# Patient Record
Sex: FEMALE | Race: WHITE | NOT HISPANIC OR LATINO | Employment: UNEMPLOYED | URBAN - METROPOLITAN AREA
[De-identification: names, ages, dates, MRNs, and addresses within clinical notes are randomized per-mention and may not be internally consistent; named-entity substitution may affect disease eponyms.]

---

## 2023-06-25 ENCOUNTER — NURSING HOME VISIT (OUTPATIENT)
Dept: POST ACUTE CARE | Facility: EXTERNAL LOCATION | Age: 83
End: 2023-06-25
Payer: MEDICARE

## 2023-06-25 DIAGNOSIS — J96.01 ACUTE RESPIRATORY FAILURE WITH HYPOXIA (MULTI): Primary | ICD-10-CM

## 2023-06-25 DIAGNOSIS — J18.9 MULTIFOCAL PNEUMONIA: ICD-10-CM

## 2023-06-25 DIAGNOSIS — F01.A0 MILD VASCULAR DEMENTIA WITHOUT BEHAVIORAL DISTURBANCE, PSYCHOTIC DISTURBANCE, MOOD DISTURBANCE, OR ANXIETY (MULTI): ICD-10-CM

## 2023-06-25 DIAGNOSIS — U07.1 COVID-19: ICD-10-CM

## 2023-06-25 DIAGNOSIS — E87.6 HYPOKALEMIA: ICD-10-CM

## 2023-06-25 DIAGNOSIS — I10 BENIGN ESSENTIAL HYPERTENSION: ICD-10-CM

## 2023-06-25 DIAGNOSIS — R53.81 PHYSICAL DECONDITIONING: ICD-10-CM

## 2023-06-25 PROCEDURE — 99306 1ST NF CARE HIGH MDM 50: CPT | Performed by: FAMILY MEDICINE

## 2023-06-25 NOTE — PROGRESS NOTES
Admission H and P    CC Covid 19 Ac resp failure    HPI  Ms. Ochoa is an 82 year old female with a PMH of HTN, HLD, and vascular  dementia who was presented to the ED with c/o SOB. On arrival to the ED, she  was mildly tachycardic, with SPO2 low normal, otherwise her vital signs were  within normal limits. Blood examination obtained was remarkable for  hyperglycemia, hypokalemia, and elevated troponin (40->37). An EKG was without  obvious signs of ischemia. She was given 500 mg of Zithromax, 6mg IV  dexamethasone, 20 meq IV K+, 40 meq PO K+, 2gm Rocephin, and 1L LR bolus. A CTA  PE was negative for PE, however did demonstrated bilateral scattered  ground-glass, intersitial, and consolidative opacities consistent with  multifocal pneumonia due to covid. Her home covid test was positive. She was  admitted to the medicine service as inpatient with telemetry for further  treatment and monitoring.  Today she is sitting in bed Uneventful night    ROS 14 systems reviewed negative except above    Active Problems  Problems   · Abnormal EEG (794.02) (R94.01)   · Abnormal EKG (794.31) (R94.31)   · Abnormal glucose (790.29) (R73.09)   · Acquired claw toe of right foot (735.5) (M20.5X1)   · Acute lower UTI (599.0) (N39.0)   · Arthralgia of pelvis or thigh (719.45) (M25.559)   · Astigmatism (367.20) (H52.209)   · Astigmatism of both eyes with presbyopia (367.20,367.4) (H52.203,H52.4)   · Ataxic gait (781.2) (R26.0)   · Ataxic gait (781.2) (R26.0)   · Benign essential hypertension (401.1) (I10)   · Cataract, nuclear sclerotic, left eye (366.16) (H25.12)   · Cataract, nuclear sclerotic, right eye (366.16) (H25.11)   · Cerebrovascular disease (437.9) (I67.9)   · Cognitive disorder (294.9) (F09)   · Colon cancer screening (V76.51) (Z12.11)   · Combined form of age-related cataract, both eyes (366.19) (H25.813)   · Dementia (294.20) (F03.90)   · Depression (311) (F32.A)   · in remission with ongoing treatment   · Elevated low density  lipoprotein (LDL) cholesterol level (272.0) (E78.00)   · Encounter for screening for malignant neoplasm of colon (V76.51) (Z12.11)   · Encounter for screening mammogram for breast cancer (V76.12) (Z12.31)   · Essential hypertension (401.9) (I10)   · Excess ear wax (380.4) (H61.20)   · Excessive cerumen in both ear canals (380.4) (H61.23)   · Executive function deficit (799.55) (R41.844)   · Falls (E888.9) (W19.XXXA)   · Fatigue (780.79) (R53.83)   · 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)   · Hallux rigidus of right foot (735.2) (M20.21)   · Hallux valgus of right foot (735.0) (M20.11)   · Hammer toe (735.4) (M20.40)   · Hip pain (719.45) (M25.559)   · Hypercholesterolemia (272.0) (E78.00)   · Hyperopia (367.0) (H52.00)   · Impacted cerumen of left ear (380.4) (H61.22)   · LBBB (left bundle branch block) (426.3) (I44.7)   · Low vitamin D level (790.6) (R79.89)   · Mild cognitive impairment (331.83) (G31.84)   · non amnestic   · Multiple acquired skin tags (701.9) (L91.8)   · Myalgia (729.1) (M79.10)   · OA (osteoarthritis) of knee (715.36) (M17.9)   · OA (osteoarthritis) of knee (715.36) (M17.9)   · Osteoporosis (733.00) (M81.0)   · Overactive bladder (596.51) (N32.81)   · Pelvic fracture (808.8) (S32.9XXA)   · Pelvic fracture (808.8) (S32.9XXA)   · Poor balance (781.99) (R26.89)   · Primary osteoarthritis of left knee (715.16) (M17.12)   · Recurrent falls (V15.88) (R29.6)   · Refraction error (367.9) (H52.7)   · Screening for colorectal cancer (V76.51,V76.41) (Z12.11,Z12.12)   · Screening for osteoporosis (V82.81) (Z13.820)   · Screening mammogram, encounter for (V76.12) (Z12.31)   · Skin lesion, superficial (709.9) (L98.9)   · Traumatic brain injury (854.00) (S06.9XAA)   · Urinary incontinence (788.30) (R32)   · UTI symptoms (788.99) (R39.9)   · Vascular dementia (290.40) (F01.50)   · Visit for screening mammogram (V76.12) (Z12.31)   · Vitamin D insufficiency (268.9) (E55.9)   · Weakness of both legs (729.89)  (R29.898)   · Weight gain (783.1) (R63.5)    Past Medical History  Problems   · History of Combined form of age-related cataract, left eye (366.19) (H25.812)   · History of Combined form of age-related cataract, right eye (366.19) (H25.811)   · Depression (311) (F32.A)   · in remission with ongoing treatment    Surgical History  Problems   · History of Cholecystectomy   · History of Hysterectomy   · History of Tonsillectomy    Family History  Mother   · Family history of Colon Cancer (V16.0)   · Family history of Diabetes Mellitus (V18.0)   · Family history of Hypertension (V17.49)  Father   · Family history of Pick's disease   · confirmed via autopsy  Brother   · Family history of Acute Myocardial Infarction (V17.3)   · Family history of Diabetes Mellitus (V18.0)   · Family history of Stroke Syndrome (V17.1)    Social History  Problems   · Education Level: Graduate school   · Marital History - Single   · Never a smoker   · Never Drank Alcohol   · Never smoker   · Non-smoker (V49.89) (Z78.9)   · Occupation: Retired   · retired teacher    Allergies  Medication   · Aspirin TABS  Recorded By: Digna Osman; 10/11/2013 12:46:29 PM   · david  Recorded By: Katty Azevedo; 10/19/2016 9:24:13 AM   · Hops Flower POWD  Recorded By: Essence Villanueva; 5/8/2019 11:04:34 AM   · Onion Extract POWD  Recorded By: Katty Azevedo; 10/19/2016 9:24:13 AM  NonMedication   · Latex  Recorded By: Agnieszka Snyder; 2/7/2014 11:53:52 AM    Physical Exam by System:    Constitutional: Well developed, awake/alert/oriented x3, no distress on RA,  alert and cooperative  Eyes: PERRL, EOMI, clear sclera  ENMT: mucous membranes moist, no apparent injury, no lesions seen  Head/Neck: Neck supple, no apparent injury, thyroid without mass or tenderness,  No JVD, trachea midline, no bruits  Respiratory/Thorax: Patent airways, CTAB, normal breath sounds, no wheezes,  Cardiovascular: Regular, rate and rhythm, no murmurs, normal S 1and S  2  Gastrointestinal: Nondistended, soft, non-tender, no rebound tenderness or  guarding, no masses palpable, no organomegaly, +BS,  Musculoskeletal: ROM intact, no joint swelling,  Extremities: normal extremities, no ankle edema, contusions or wounds, no  clubbing  Neurological: alert and oriented x3, intact senses, motor, response and  reflexes, normal strength  Psychological: Appropriate mood and behavior  Skin: Warm and dry, no lesions, no rashes    Results:  CBC: 6/23/2023 11:40    \ Hgb / \ 14.1 /  WBC ---------------- Plt 14.1 H ---------------- 354   / Hct \ / 40.7 \    RBC: 4.46 MCV: 91    CMP: 6/23/2023 11:40  NA+  Cl-  BUN / 137  102  15 /  -------------------------------- Glucose  --------------------------- 115 H   K+  HCO3-  Creat \ 4.2  24  0.73 \    \ T Bili / \ 0.7 /  AST x ---- x ALT 25 x ---- x 45   / Alk P \ / 59 \  Calcium : 9.2 Anion Gap : 15 Albumin : 3.8 T Protein : 6.8    Assessment and Plan:  Comorbidities:   Comorbidity Other    Code Status:   Code Status Full Code    Assessment:  Ms. Ochoa is an 82 year old female with a PMH of HTN, HLD, and vascular  dementia who is presented to the ED with shortness of breath. On arrival to the  ED, she is mildly tachycardic, with SPO2 low normal, but otherwise her vital  signs are within normal limits. Blood work obtained was remarkable for  hyperglycemia, severe hypokalemia, and elevated troponin (40->37). An EKG was  without obvious signs of ischemia. She was given 500mg of Zithromax, 6mg IV  dexamethasone, 20 meq IV K+, 40 meq PO K+, 2gm Rocephin, and 1L LR bolus. A CTA  PE was negative for PE, however did demonstrated bilateral scattered  ground-glass, intersitial, and consolidative opacities consistent with  multifocal pneumonia. She is admitted to the medicine service as inpatient with  telemetry for further treatment and monitoring.    Acute hypoxic respiratory failure  Covid-19 pneumonia  - Completed Remdesivir  - CTA PE -ve for PE  -  Bilateral scattered GGO c/w multifocal infection on CTA PE  - Rocephin, Zithromax given in ED, continue  - Heparin SQ DVT chemoppx  -Benzonatate 200mg TID for cough  -Patient denies any SOB at this time,    HTN  BP is stable,  - Continue home medications,    Hypokalemia,  improved    Troponin elevation  - Downtrended  - No EKG changes  - Suspect demand mismatch due to hypoxia    physical deconditioning OT/PT    CBC BMP wedensday    Problem List Items Addressed This Visit       COVID-19    Acute respiratory failure with hypoxia (CMS/HCC) - Primary    Multifocal pneumonia    Physical deconditioning    Mild vascular dementia without behavioral disturbance, psychotic disturbance, mood disturbance, or anxiety (CMS/HCC)    Benign essential hypertension    Hypokalemia         PLAN:Reviewed orders, medications, records, and pertinent labs/x-rays from   hospital. Monitor VS, BS, O2, etc as per protocol. See written orders. PT/OT   will evaluate and start appropriate rehabilitation program. Reviewed and signed   off orders, medications,Labs, x-rays, and current diagnoses. Reviewed and   updated CPR status and any changes in Advanced directives. Continue Rehab Will   see 1-2 times weekly for next 30 days then reassess. Will always see at   resident, family , or nursing request. Discharge Planning   Time   Time Spent With Patient: 45 minutes of which greater than 50 percent was spent   counseling and or coordinating care.    Electronically Signed Yunior Del Toro MD

## 2023-06-25 NOTE — LETTER
Patient: Noy Ochoa  : 1940    Encounter Date: 2023    Admission H and P    CC Covid 19 Ac resp failure    HPI  Ms. Ochoa is an 82 year old female with a PMH of HTN, HLD, and vascular  dementia who was presented to the ED with c/o SOB. On arrival to the ED, she  was mildly tachycardic, with SPO2 low normal, otherwise her vital signs were  within normal limits. Blood examination obtained was remarkable for  hyperglycemia, hypokalemia, and elevated troponin (40->37). An EKG was without  obvious signs of ischemia. She was given 500 mg of Zithromax, 6mg IV  dexamethasone, 20 meq IV K+, 40 meq PO K+, 2gm Rocephin, and 1L LR bolus. A CTA  PE was negative for PE, however did demonstrated bilateral scattered  ground-glass, intersitial, and consolidative opacities consistent with  multifocal pneumonia due to covid. Her home covid test was positive. She was  admitted to the medicine service as inpatient with telemetry for further  treatment and monitoring.  Today she is sitting in bed Uneventful night    ROS 14 systems reviewed negative except above    Active Problems  Problems   · Abnormal EEG (794.02) (R94.01)   · Abnormal EKG (794.31) (R94.31)   · Abnormal glucose (790.29) (R73.09)   · Acquired claw toe of right foot (735.5) (M20.5X1)   · Acute lower UTI (599.0) (N39.0)   · Arthralgia of pelvis or thigh (719.45) (M25.559)   · Astigmatism (367.20) (H52.209)   · Astigmatism of both eyes with presbyopia (367.20,367.4) (H52.203,H52.4)   · Ataxic gait (781.2) (R26.0)   · Ataxic gait (781.2) (R26.0)   · Benign essential hypertension (401.1) (I10)   · Cataract, nuclear sclerotic, left eye (366.16) (H25.12)   · Cataract, nuclear sclerotic, right eye (366.16) (H25.11)   · Cerebrovascular disease (437.9) (I67.9)   · Cognitive disorder (294.9) (F09)   · Colon cancer screening (V76.51) (Z12.11)   · Combined form of age-related cataract, both eyes (366.19) (H25.813)   · Dementia (294.20) (F03.90)   · Depression (311)  (F32.A)   · in remission with ongoing treatment   · Elevated low density lipoprotein (LDL) cholesterol level (272.0) (E78.00)   · Encounter for screening for malignant neoplasm of colon (V76.51) (Z12.11)   · Encounter for screening mammogram for breast cancer (V76.12) (Z12.31)   · Essential hypertension (401.9) (I10)   · Excess ear wax (380.4) (H61.20)   · Excessive cerumen in both ear canals (380.4) (H61.23)   · Executive function deficit (799.55) (R41.844)   · Falls (E888.9) (W19.XXXA)   · Fatigue (780.79) (R53.83)   · 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)   · Hallux rigidus of right foot (735.2) (M20.21)   · Hallux valgus of right foot (735.0) (M20.11)   · Hammer toe (735.4) (M20.40)   · Hip pain (719.45) (M25.559)   · Hypercholesterolemia (272.0) (E78.00)   · Hyperopia (367.0) (H52.00)   · Impacted cerumen of left ear (380.4) (H61.22)   · LBBB (left bundle branch block) (426.3) (I44.7)   · Low vitamin D level (790.6) (R79.89)   · Mild cognitive impairment (331.83) (G31.84)   · non amnestic   · Multiple acquired skin tags (701.9) (L91.8)   · Myalgia (729.1) (M79.10)   · OA (osteoarthritis) of knee (715.36) (M17.9)   · OA (osteoarthritis) of knee (715.36) (M17.9)   · Osteoporosis (733.00) (M81.0)   · Overactive bladder (596.51) (N32.81)   · Pelvic fracture (808.8) (S32.9XXA)   · Pelvic fracture (808.8) (S32.9XXA)   · Poor balance (781.99) (R26.89)   · Primary osteoarthritis of left knee (715.16) (M17.12)   · Recurrent falls (V15.88) (R29.6)   · Refraction error (367.9) (H52.7)   · Screening for colorectal cancer (V76.51,V76.41) (Z12.11,Z12.12)   · Screening for osteoporosis (V82.81) (Z13.820)   · Screening mammogram, encounter for (V76.12) (Z12.31)   · Skin lesion, superficial (709.9) (L98.9)   · Traumatic brain injury (854.00) (S06.9XAA)   · Urinary incontinence (788.30) (R32)   · UTI symptoms (788.99) (R39.9)   · Vascular dementia (290.40) (F01.50)   · Visit for screening mammogram (V76.12) (Z12.31)   · Vitamin  D insufficiency (268.9) (E55.9)   · Weakness of both legs (729.89) (R29.898)   · Weight gain (783.1) (R63.5)    Past Medical History  Problems   · History of Combined form of age-related cataract, left eye (366.19) (H25.812)   · History of Combined form of age-related cataract, right eye (366.19) (H25.811)   · Depression (311) (F32.A)   · in remission with ongoing treatment    Surgical History  Problems   · History of Cholecystectomy   · History of Hysterectomy   · History of Tonsillectomy    Family History  Mother   · Family history of Colon Cancer (V16.0)   · Family history of Diabetes Mellitus (V18.0)   · Family history of Hypertension (V17.49)  Father   · Family history of Pick's disease   · confirmed via autopsy  Brother   · Family history of Acute Myocardial Infarction (V17.3)   · Family history of Diabetes Mellitus (V18.0)   · Family history of Stroke Syndrome (V17.1)    Social History  Problems   · Education Level: Graduate school   · Marital History - Single   · Never a smoker   · Never Drank Alcohol   · Never smoker   · Non-smoker (V49.89) (Z78.9)   · Occupation: Retired   · retired teacher    Allergies  Medication   · Aspirin TABS  Recorded By: Digna Osman; 10/11/2013 12:46:29 PM   · david  Recorded By: Katty Azevedo; 10/19/2016 9:24:13 AM   · Hops Flower POWD  Recorded By: Essence Villanueva; 5/8/2019 11:04:34 AM   · Onion Extract POWD  Recorded By: Katty Azevedo; 10/19/2016 9:24:13 AM  NonMedication   · Latex  Recorded By: Agnieszka Snyder; 2/7/2014 11:53:52 AM    Physical Exam by System:    Constitutional: Well developed, awake/alert/oriented x3, no distress on RA,  alert and cooperative  Eyes: PERRL, EOMI, clear sclera  ENMT: mucous membranes moist, no apparent injury, no lesions seen  Head/Neck: Neck supple, no apparent injury, thyroid without mass or tenderness,  No JVD, trachea midline, no bruits  Respiratory/Thorax: Patent airways, CTAB, normal breath sounds, no wheezes,  Cardiovascular:  Regular, rate and rhythm, no murmurs, normal S 1and S 2  Gastrointestinal: Nondistended, soft, non-tender, no rebound tenderness or  guarding, no masses palpable, no organomegaly, +BS,  Musculoskeletal: ROM intact, no joint swelling,  Extremities: normal extremities, no ankle edema, contusions or wounds, no  clubbing  Neurological: alert and oriented x3, intact senses, motor, response and  reflexes, normal strength  Psychological: Appropriate mood and behavior  Skin: Warm and dry, no lesions, no rashes    Results:  CBC: 6/23/2023 11:40    \ Hgb / \ 14.1 /  WBC ---------------- Plt 14.1 H ---------------- 354   / Hct \ / 40.7 \    RBC: 4.46 MCV: 91    CMP: 6/23/2023 11:40  NA+  Cl-  BUN / 137  102  15 /  -------------------------------- Glucose  --------------------------- 115 H   K+  HCO3-  Creat \ 4.2  24  0.73 \    \ T Bili / \ 0.7 /  AST x ---- x ALT 25 x ---- x 45   / Alk P \ / 59 \  Calcium : 9.2 Anion Gap : 15 Albumin : 3.8 T Protein : 6.8    Assessment and Plan:  Comorbidities:   Comorbidity Other    Code Status:   Code Status Full Code    Assessment:  Ms. Ochoa is an 82 year old female with a PMH of HTN, HLD, and vascular  dementia who is presented to the ED with shortness of breath. On arrival to the  ED, she is mildly tachycardic, with SPO2 low normal, but otherwise her vital  signs are within normal limits. Blood work obtained was remarkable for  hyperglycemia, severe hypokalemia, and elevated troponin (40->37). An EKG was  without obvious signs of ischemia. She was given 500mg of Zithromax, 6mg IV  dexamethasone, 20 meq IV K+, 40 meq PO K+, 2gm Rocephin, and 1L LR bolus. A CTA  PE was negative for PE, however did demonstrated bilateral scattered  ground-glass, intersitial, and consolidative opacities consistent with  multifocal pneumonia. She is admitted to the medicine service as inpatient with  telemetry for further treatment and monitoring.    Acute hypoxic respiratory failure  Covid-19  pneumonia  - Completed Remdesivir  - CTA PE -ve for PE  - Bilateral scattered GGO c/w multifocal infection on CTA PE  - Rocephin, Zithromax given in ED, continue  - Heparin SQ DVT chemoppx  -Benzonatate 200mg TID for cough  -Patient denies any SOB at this time,    HTN  BP is stable,  - Continue home medications,    Hypokalemia,  improved    Troponin elevation  - Downtrended  - No EKG changes  - Suspect demand mismatch due to hypoxia    physical deconditioning OT/PT    CBC BMP wedensday    Problem List Items Addressed This Visit       COVID-19    Acute respiratory failure with hypoxia (CMS/HCC) - Primary    Multifocal pneumonia    Physical deconditioning    Mild vascular dementia without behavioral disturbance, psychotic disturbance, mood disturbance, or anxiety (CMS/HCC)    Benign essential hypertension    Hypokalemia         PLAN:Reviewed orders, medications, records, and pertinent labs/x-rays from   hospital. Monitor VS, BS, O2, etc as per protocol. See written orders. PT/OT   will evaluate and start appropriate rehabilitation program. Reviewed and signed   off orders, medications,Labs, x-rays, and current diagnoses. Reviewed and   updated CPR status and any changes in Advanced directives. Continue Rehab Will   see 1-2 times weekly for next 30 days then reassess. Will always see at   resident, family , or nursing request. Discharge Planning   Time   Time Spent With Patient: 45 minutes of which greater than 50 percent was spent   counseling and or coordinating care.    Electronically Signed Yunior Del Toro MD      Electronically Signed By: Yunior Del Toro MD   6/25/23  5:42 PM

## 2023-06-28 ENCOUNTER — NURSING HOME VISIT (OUTPATIENT)
Dept: POST ACUTE CARE | Facility: EXTERNAL LOCATION | Age: 83
End: 2023-06-28
Payer: MEDICARE

## 2023-06-28 DIAGNOSIS — F01.A0 MILD VASCULAR DEMENTIA WITHOUT BEHAVIORAL DISTURBANCE, PSYCHOTIC DISTURBANCE, MOOD DISTURBANCE, OR ANXIETY (MULTI): ICD-10-CM

## 2023-06-28 DIAGNOSIS — I10 BENIGN ESSENTIAL HYPERTENSION: ICD-10-CM

## 2023-06-28 DIAGNOSIS — R53.81 PHYSICAL DECONDITIONING: ICD-10-CM

## 2023-06-28 DIAGNOSIS — J96.01 ACUTE RESPIRATORY FAILURE WITH HYPOXIA (MULTI): Primary | ICD-10-CM

## 2023-06-28 DIAGNOSIS — E87.6 HYPOKALEMIA: ICD-10-CM

## 2023-06-28 DIAGNOSIS — J18.9 MULTIFOCAL PNEUMONIA: ICD-10-CM

## 2023-06-28 DIAGNOSIS — U07.1 COVID-19: ICD-10-CM

## 2023-06-28 PROCEDURE — 99309 SBSQ NF CARE MODERATE MDM 30: CPT | Performed by: FAMILY MEDICINE

## 2023-06-28 NOTE — LETTER
Patient: Noy Ochoa  : 1940    Encounter Date: 2023    Acute visit    CC Covid 19 Ac resp failure    HPI  Ms. Ochoa is an 82 year old female with a PMH of HTN, HLD, and vascular  dementia who was presented to the ED with c/o SOB. On arrival to the ED, she  was mildly tachycardic, with SPO2 low normal, otherwise her vital signs were  within normal limits. Blood examination obtained was remarkable for  hyperglycemia, hypokalemia, and elevated troponin (40->37). An EKG was without  obvious signs of ischemia. She was given 500 mg of Zithromax, 6mg IV  dexamethasone, 20 meq IV K+, 40 meq PO K+, 2gm Rocephin, and 1L LR bolus. A CTA  PE was negative for PE, however did demonstrated bilateral scattered  ground-glass, intersitial, and consolidative opacities consistent with  multifocal pneumonia due to covid. Her home covid test was positive. She was  admitted to the medicine service as inpatient with telemetry for further  treatment and monitoring.  Today she is sitting in bed Uneventful night    ROS 14 systems reviewed negative except above    Active Problems  Problems   · Abnormal EEG (794.02) (R94.01)   · Abnormal EKG (794.31) (R94.31)   · Abnormal glucose (790.29) (R73.09)   · Acquired claw toe of right foot (735.5) (M20.5X1)   · Acute lower UTI (599.0) (N39.0)   · Arthralgia of pelvis or thigh (719.45) (M25.559)   · Astigmatism (367.20) (H52.209)   · Astigmatism of both eyes with presbyopia (367.20,367.4) (H52.203,H52.4)   · Ataxic gait (781.2) (R26.0)   · Ataxic gait (781.2) (R26.0)   · Benign essential hypertension (401.1) (I10)   · Cataract, nuclear sclerotic, left eye (366.16) (H25.12)   · Cataract, nuclear sclerotic, right eye (366.16) (H25.11)   · Cerebrovascular disease (437.9) (I67.9)   · Cognitive disorder (294.9) (F09)   · Colon cancer screening (V76.51) (Z12.11)   · Combined form of age-related cataract, both eyes (366.19) (H25.813)   · Dementia (294.20) (F03.90)   · Depression (311)  (F32.A)   · in remission with ongoing treatment   · Elevated low density lipoprotein (LDL) cholesterol level (272.0) (E78.00)   · Encounter for screening for malignant neoplasm of colon (V76.51) (Z12.11)   · Encounter for screening mammogram for breast cancer (V76.12) (Z12.31)   · Essential hypertension (401.9) (I10)   · Excess ear wax (380.4) (H61.20)   · Excessive cerumen in both ear canals (380.4) (H61.23)   · Executive function deficit (799.55) (R41.844)   · Falls (E888.9) (W19.XXXA)   · Fatigue (780.79) (R53.83)   · 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)   · Hallux rigidus of right foot (735.2) (M20.21)   · Hallux valgus of right foot (735.0) (M20.11)   · Hammer toe (735.4) (M20.40)   · Hip pain (719.45) (M25.559)   · Hypercholesterolemia (272.0) (E78.00)   · Hyperopia (367.0) (H52.00)   · Impacted cerumen of left ear (380.4) (H61.22)   · LBBB (left bundle branch block) (426.3) (I44.7)   · Low vitamin D level (790.6) (R79.89)   · Mild cognitive impairment (331.83) (G31.84)   · non amnestic   · Multiple acquired skin tags (701.9) (L91.8)   · Myalgia (729.1) (M79.10)   · OA (osteoarthritis) of knee (715.36) (M17.9)   · OA (osteoarthritis) of knee (715.36) (M17.9)   · Osteoporosis (733.00) (M81.0)   · Overactive bladder (596.51) (N32.81)   · Pelvic fracture (808.8) (S32.9XXA)   · Pelvic fracture (808.8) (S32.9XXA)   · Poor balance (781.99) (R26.89)   · Primary osteoarthritis of left knee (715.16) (M17.12)   · Recurrent falls (V15.88) (R29.6)   · Refraction error (367.9) (H52.7)   · Screening for colorectal cancer (V76.51,V76.41) (Z12.11,Z12.12)   · Screening for osteoporosis (V82.81) (Z13.820)   · Screening mammogram, encounter for (V76.12) (Z12.31)   · Skin lesion, superficial (709.9) (L98.9)   · Traumatic brain injury (854.00) (S06.9XAA)   · Urinary incontinence (788.30) (R32)   · UTI symptoms (788.99) (R39.9)   · Vascular dementia (290.40) (F01.50)   · Visit for screening mammogram (V76.12) (Z12.31)   · Vitamin  D insufficiency (268.9) (E55.9)   · Weakness of both legs (729.89) (R29.898)   · Weight gain (783.1) (R63.5)    Past Medical History  Problems   · History of Combined form of age-related cataract, left eye (366.19) (H25.812)   · History of Combined form of age-related cataract, right eye (366.19) (H25.811)   · Depression (311) (F32.A)   · in remission with ongoing treatment    Surgical History  Problems   · History of Cholecystectomy   · History of Hysterectomy   · History of Tonsillectomy    Family History  Mother   · Family history of Colon Cancer (V16.0)   · Family history of Diabetes Mellitus (V18.0)   · Family history of Hypertension (V17.49)  Father   · Family history of Pick's disease   · confirmed via autopsy  Brother   · Family history of Acute Myocardial Infarction (V17.3)   · Family history of Diabetes Mellitus (V18.0)   · Family history of Stroke Syndrome (V17.1)    Social History  Problems   · Education Level: Graduate school   · Marital History - Single   · Never a smoker   · Never Drank Alcohol   · Never smoker   · Non-smoker (V49.89) (Z78.9)   · Occupation: Retired   · retired teacher    Allergies  Medication   · Aspirin TABS  Recorded By: iDgna Osman; 10/11/2013 12:46:29 PM   · david  Recorded By: Katty Azevedo; 10/19/2016 9:24:13 AM   · Hops Flower POWD  Recorded By: Essence Villanueva; 5/8/2019 11:04:34 AM   · Onion Extract POWD  Recorded By: Katty Azevedo; 10/19/2016 9:24:13 AM  NonMedication   · Latex  Recorded By: Agnieszka Snyder; 2/7/2014 11:53:52 AM    Physical Exam by System:    Constitutional: Well developed, awake/alert/oriented x3, no distress on RA,  alert and cooperative  Eyes: PERRL, EOMI, clear sclera  ENMT: mucous membranes moist, no apparent injury, no lesions seen  Head/Neck: Neck supple, no apparent injury, thyroid without mass or tenderness,  No JVD, trachea midline, no bruits  Respiratory/Thorax: Patent airways, CTAB, normal breath sounds, no wheezes,  Cardiovascular:  Regular, rate and rhythm, no murmurs, normal S 1and S 2  Gastrointestinal: Nondistended, soft, non-tender, no rebound tenderness or  guarding, no masses palpable, no organomegaly, +BS,  Musculoskeletal: ROM intact, no joint swelling,  Extremities: normal extremities, no ankle edema, contusions or wounds, no  clubbing  Neurological: alert and oriented x3, intact senses, motor, response and  reflexes, normal strength  Psychological: Appropriate mood and behavior  Skin: Warm and dry, no lesions, no rashes    Results:  CBC: 6/23/2023 11:40    \ Hgb / \ 14.1 /  WBC ---------------- Plt 14.1 H ---------------- 354   / Hct \ / 40.7 \    RBC: 4.46 MCV: 91    CMP: 6/23/2023 11:40  NA+  Cl-  BUN / 137  102  15 /  -------------------------------- Glucose  --------------------------- 115 H   K+  HCO3-  Creat \ 4.2  24  0.73 \    \ T Bili / \ 0.7 /  AST x ---- x ALT 25 x ---- x 45   / Alk P \ / 59 \  Calcium : 9.2 Anion Gap : 15 Albumin : 3.8 T Protein : 6.8    Assessment and Plan:  Comorbidities:   Comorbidity Other    Code Status:   Code Status Full Code    Assessment:  Ms. Ochoa is an 82 year old female with a PMH of HTN, HLD, and vascular  dementia who is presented to the ED with shortness of breath. On arrival to the  ED, she is mildly tachycardic, with SPO2 low normal, but otherwise her vital  signs are within normal limits. Blood work obtained was remarkable for  hyperglycemia, severe hypokalemia, and elevated troponin (40->37). An EKG was  without obvious signs of ischemia. She was given 500mg of Zithromax, 6mg IV  dexamethasone, 20 meq IV K+, 40 meq PO K+, 2gm Rocephin, and 1L LR bolus. A CTA  PE was negative for PE, however did demonstrated bilateral scattered  ground-glass, intersitial, and consolidative opacities consistent with  multifocal pneumonia. She is admitted to the medicine service as inpatient with  telemetry for further treatment and monitoring.    Acute hypoxic respiratory failure  Covid-19  pneumonia  - Completed Remdesivir  - CTA PE -ve for PE  - Bilateral scattered GGO c/w multifocal infection on CTA PE  - Rocephin, Zithromax given in ED, continue  - Heparin SQ DVT chemoppx  -Benzonatate 200mg TID for cough  -Patient denies any SOB at this time,    HTN  BP is stable,  - Continue home medications,    Hypokalemia,  improved    Troponin elevation  - Downtrended  - No EKG changes  - Suspect demand mismatch due to hypoxia    physical deconditioning OT/PT    CBC BMP wedensday    Problem List Items Addressed This Visit       COVID-19    Acute respiratory failure with hypoxia (CMS/HCC) - Primary    Multifocal pneumonia    Physical deconditioning    Mild vascular dementia without behavioral disturbance, psychotic disturbance, mood disturbance, or anxiety (CMS/HCC)    Benign essential hypertension    Hypokalemia           PLAN:Reviewed orders, medications, records, and pertinent labs/x-rays from   hospital. Monitor VS, BS, O2, etc as per protocol. See written orders. PT/OT   will evaluate and start appropriate rehabilitation program. Reviewed and signed   off orders, medications,Labs, x-rays, and current diagnoses. Reviewed and   updated CPR status and any changes in Advanced directives. Continue Rehab Will   see 1-2 times weekly for next 30 days then reassess. Will always see at   resident, family , or nursing request. Discharge Planning   Time   Time Spent With Patient: 45 minutes of which greater than 50 percent was spent   counseling and or coordinating care.    Electronically Signed Yunior Del Toro MD      Electronically Signed By: Yunior Del Toro MD   7/21/23  4:52 PM

## 2023-06-30 ENCOUNTER — NURSING HOME VISIT (OUTPATIENT)
Dept: POST ACUTE CARE | Facility: EXTERNAL LOCATION | Age: 83
End: 2023-06-30
Payer: MEDICARE

## 2023-06-30 DIAGNOSIS — E87.6 HYPOKALEMIA: ICD-10-CM

## 2023-06-30 DIAGNOSIS — J96.01 ACUTE RESPIRATORY FAILURE WITH HYPOXIA (MULTI): Primary | ICD-10-CM

## 2023-06-30 DIAGNOSIS — F01.A0 MILD VASCULAR DEMENTIA WITHOUT BEHAVIORAL DISTURBANCE, PSYCHOTIC DISTURBANCE, MOOD DISTURBANCE, OR ANXIETY (MULTI): ICD-10-CM

## 2023-06-30 DIAGNOSIS — J18.9 MULTIFOCAL PNEUMONIA: ICD-10-CM

## 2023-06-30 DIAGNOSIS — U07.1 COVID-19: ICD-10-CM

## 2023-06-30 DIAGNOSIS — R53.81 PHYSICAL DECONDITIONING: ICD-10-CM

## 2023-06-30 DIAGNOSIS — I10 BENIGN ESSENTIAL HYPERTENSION: ICD-10-CM

## 2023-06-30 PROCEDURE — 99309 SBSQ NF CARE MODERATE MDM 30: CPT | Performed by: FAMILY MEDICINE

## 2023-06-30 NOTE — LETTER
Patient: Noy Ochoa  : 1940    Encounter Date: 2023    Acute visit    CC Covid 19 Ac resp failure    HPI  Ms. Ochoa is an 82 year old female with a PMH of HTN, HLD, and vascular  dementia who was presented to the ED with c/o SOB. On arrival to the ED, she  was mildly tachycardic, with SPO2 low normal, otherwise her vital signs were  within normal limits. Blood examination obtained was remarkable for  hyperglycemia, hypokalemia, and elevated troponin (40->37). An EKG was without  obvious signs of ischemia. She was given 500 mg of Zithromax, 6mg IV  dexamethasone, 20 meq IV K+, 40 meq PO K+, 2gm Rocephin, and 1L LR bolus. A CTA  PE was negative for PE, however did demonstrated bilateral scattered  ground-glass, intersitial, and consolidative opacities consistent with  multifocal pneumonia due to covid. Her home covid test was positive. She was  admitted to the medicine service as inpatient with telemetry for further  treatment and monitoring.  Today she is sitting in bed Uneventful night    ROS 14 systems reviewed negative except above    Active Problems  Problems   · Abnormal EEG (794.02) (R94.01)   · Abnormal EKG (794.31) (R94.31)   · Abnormal glucose (790.29) (R73.09)   · Acquired claw toe of right foot (735.5) (M20.5X1)   · Acute lower UTI (599.0) (N39.0)   · Arthralgia of pelvis or thigh (719.45) (M25.559)   · Astigmatism (367.20) (H52.209)   · Astigmatism of both eyes with presbyopia (367.20,367.4) (H52.203,H52.4)   · Ataxic gait (781.2) (R26.0)   · Ataxic gait (781.2) (R26.0)   · Benign essential hypertension (401.1) (I10)   · Cataract, nuclear sclerotic, left eye (366.16) (H25.12)   · Cataract, nuclear sclerotic, right eye (366.16) (H25.11)   · Cerebrovascular disease (437.9) (I67.9)   · Cognitive disorder (294.9) (F09)   · Colon cancer screening (V76.51) (Z12.11)   · Combined form of age-related cataract, both eyes (366.19) (H25.813)   · Dementia (294.20) (F03.90)   · Depression (311)  (F32.A)   · in remission with ongoing treatment   · Elevated low density lipoprotein (LDL) cholesterol level (272.0) (E78.00)   · Encounter for screening for malignant neoplasm of colon (V76.51) (Z12.11)   · Encounter for screening mammogram for breast cancer (V76.12) (Z12.31)   · Essential hypertension (401.9) (I10)   · Excess ear wax (380.4) (H61.20)   · Excessive cerumen in both ear canals (380.4) (H61.23)   · Executive function deficit (799.55) (R41.844)   · Falls (E888.9) (W19.XXXA)   · Fatigue (780.79) (R53.83)   · 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)   · Hallux rigidus of right foot (735.2) (M20.21)   · Hallux valgus of right foot (735.0) (M20.11)   · Hammer toe (735.4) (M20.40)   · Hip pain (719.45) (M25.559)   · Hypercholesterolemia (272.0) (E78.00)   · Hyperopia (367.0) (H52.00)   · Impacted cerumen of left ear (380.4) (H61.22)   · LBBB (left bundle branch block) (426.3) (I44.7)   · Low vitamin D level (790.6) (R79.89)   · Mild cognitive impairment (331.83) (G31.84)   · non amnestic   · Multiple acquired skin tags (701.9) (L91.8)   · Myalgia (729.1) (M79.10)   · OA (osteoarthritis) of knee (715.36) (M17.9)   · OA (osteoarthritis) of knee (715.36) (M17.9)   · Osteoporosis (733.00) (M81.0)   · Overactive bladder (596.51) (N32.81)   · Pelvic fracture (808.8) (S32.9XXA)   · Pelvic fracture (808.8) (S32.9XXA)   · Poor balance (781.99) (R26.89)   · Primary osteoarthritis of left knee (715.16) (M17.12)   · Recurrent falls (V15.88) (R29.6)   · Refraction error (367.9) (H52.7)   · Screening for colorectal cancer (V76.51,V76.41) (Z12.11,Z12.12)   · Screening for osteoporosis (V82.81) (Z13.820)   · Screening mammogram, encounter for (V76.12) (Z12.31)   · Skin lesion, superficial (709.9) (L98.9)   · Traumatic brain injury (854.00) (S06.9XAA)   · Urinary incontinence (788.30) (R32)   · UTI symptoms (788.99) (R39.9)   · Vascular dementia (290.40) (F01.50)   · Visit for screening mammogram (V76.12) (Z12.31)   · Vitamin  D insufficiency (268.9) (E55.9)   · Weakness of both legs (729.89) (R29.898)   · Weight gain (783.1) (R63.5)    Past Medical History  Problems   · History of Combined form of age-related cataract, left eye (366.19) (H25.812)   · History of Combined form of age-related cataract, right eye (366.19) (H25.811)   · Depression (311) (F32.A)   · in remission with ongoing treatment    Surgical History  Problems   · History of Cholecystectomy   · History of Hysterectomy   · History of Tonsillectomy    Family History  Mother   · Family history of Colon Cancer (V16.0)   · Family history of Diabetes Mellitus (V18.0)   · Family history of Hypertension (V17.49)  Father   · Family history of Pick's disease   · confirmed via autopsy  Brother   · Family history of Acute Myocardial Infarction (V17.3)   · Family history of Diabetes Mellitus (V18.0)   · Family history of Stroke Syndrome (V17.1)    Social History  Problems   · Education Level: Graduate school   · Marital History - Single   · Never a smoker   · Never Drank Alcohol   · Never smoker   · Non-smoker (V49.89) (Z78.9)   · Occupation: Retired   · retired teacher    Allergies  Medication   · Aspirin TABS  Recorded By: Digna Osman; 10/11/2013 12:46:29 PM   · david  Recorded By: Katty Azevedo; 10/19/2016 9:24:13 AM   · Hops Flower POWD  Recorded By: Essence Villanueva; 5/8/2019 11:04:34 AM   · Onion Extract POWD  Recorded By: Katty Azevedo; 10/19/2016 9:24:13 AM  NonMedication   · Latex  Recorded By: Agnieszka Snyder; 2/7/2014 11:53:52 AM    Physical Exam by System:    Constitutional: Well developed, awake/alert/oriented x3, no distress on RA,  alert and cooperative  Eyes: PERRL, EOMI, clear sclera  ENMT: mucous membranes moist, no apparent injury, no lesions seen  Head/Neck: Neck supple, no apparent injury, thyroid without mass or tenderness,  No JVD, trachea midline, no bruits  Respiratory/Thorax: Patent airways, CTAB, normal breath sounds, no wheezes,  Cardiovascular:  Regular, rate and rhythm, no murmurs, normal S 1and S 2  Gastrointestinal: Nondistended, soft, non-tender, no rebound tenderness or  guarding, no masses palpable, no organomegaly, +BS,  Musculoskeletal: ROM intact, no joint swelling,  Extremities: normal extremities, no ankle edema, contusions or wounds, no  clubbing  Neurological: alert and oriented x3, intact senses, motor, response and  reflexes, normal strength  Psychological: Appropriate mood and behavior  Skin: Warm and dry, no lesions, no rashes    Results:  CBC: 6/23/2023 11:40    \ Hgb / \ 14.1 /  WBC ---------------- Plt 14.1 H ---------------- 354   / Hct \ / 40.7 \    RBC: 4.46 MCV: 91    CMP: 6/23/2023 11:40  NA+  Cl-  BUN / 137  102  15 /  -------------------------------- Glucose  --------------------------- 115 H   K+  HCO3-  Creat \ 4.2  24  0.73 \    \ T Bili / \ 0.7 /  AST x ---- x ALT 25 x ---- x 45   / Alk P \ / 59 \  Calcium : 9.2 Anion Gap : 15 Albumin : 3.8 T Protein : 6.8    Assessment and Plan:  Comorbidities:   Comorbidity Other    Code Status:   Code Status Full Code    Assessment:  Ms. Ochoa is an 82 year old female with a PMH of HTN, HLD, and vascular  dementia who is presented to the ED with shortness of breath. On arrival to the  ED, she is mildly tachycardic, with SPO2 low normal, but otherwise her vital  signs are within normal limits. Blood work obtained was remarkable for  hyperglycemia, severe hypokalemia, and elevated troponin (40->37). An EKG was  without obvious signs of ischemia. She was given 500mg of Zithromax, 6mg IV  dexamethasone, 20 meq IV K+, 40 meq PO K+, 2gm Rocephin, and 1L LR bolus. A CTA  PE was negative for PE, however did demonstrated bilateral scattered  ground-glass, intersitial, and consolidative opacities consistent with  multifocal pneumonia. She is admitted to the medicine service as inpatient with  telemetry for further treatment and monitoring.    Acute hypoxic respiratory failure  Covid-19  pneumonia  - Completed Remdesivir  - CTA PE -ve for PE  - Bilateral scattered GGO c/w multifocal infection on CTA PE  - Rocephin, Zithromax given in ED, continue  - Heparin SQ DVT chemoppx  -Benzonatate 200mg TID for cough  -Patient denies any SOB at this time,    HTN  BP is stable,  - Continue home medications,    Hypokalemia,  improved    Troponin elevation  - Downtrended  - No EKG changes  - Suspect demand mismatch due to hypoxia    physical deconditioning OT/PT    CBC BMP wedensday    Problem List Items Addressed This Visit       COVID-19    Acute respiratory failure with hypoxia (CMS/HCC) - Primary    Multifocal pneumonia    Physical deconditioning    Mild vascular dementia without behavioral disturbance, psychotic disturbance, mood disturbance, or anxiety (CMS/HCC)    Benign essential hypertension    Hypokalemia             PLAN:Reviewed orders, medications, records, and pertinent labs/x-rays from   hospital. Monitor VS, BS, O2, etc as per protocol. See written orders. PT/OT   will evaluate and start appropriate rehabilitation program. Reviewed and signed   off orders, medications,Labs, x-rays, and current diagnoses. Reviewed and   updated CPR status and any changes in Advanced directives. Continue Rehab Will   see 1-2 times weekly for next 30 days then reassess. Will always see at   resident, family , or nursing request. Discharge Planning   Time   Time Spent With Patient: 45 minutes of which greater than 50 percent was spent   counseling and or coordinating care.    Electronically Signed Yunior Del Toro MD      Electronically Signed By: Yunior Del Toro MD   7/21/23  4:54 PM

## 2023-07-02 ENCOUNTER — NURSING HOME VISIT (OUTPATIENT)
Dept: POST ACUTE CARE | Facility: EXTERNAL LOCATION | Age: 83
End: 2023-07-02
Payer: MEDICARE

## 2023-07-02 DIAGNOSIS — J18.9 MULTIFOCAL PNEUMONIA: ICD-10-CM

## 2023-07-02 DIAGNOSIS — I10 BENIGN ESSENTIAL HYPERTENSION: ICD-10-CM

## 2023-07-02 DIAGNOSIS — J96.01 ACUTE RESPIRATORY FAILURE WITH HYPOXIA (MULTI): Primary | ICD-10-CM

## 2023-07-02 DIAGNOSIS — E87.6 HYPOKALEMIA: ICD-10-CM

## 2023-07-02 DIAGNOSIS — F01.A0 MILD VASCULAR DEMENTIA WITHOUT BEHAVIORAL DISTURBANCE, PSYCHOTIC DISTURBANCE, MOOD DISTURBANCE, OR ANXIETY (MULTI): ICD-10-CM

## 2023-07-02 DIAGNOSIS — U07.1 COVID-19: ICD-10-CM

## 2023-07-02 PROCEDURE — 99309 SBSQ NF CARE MODERATE MDM 30: CPT | Performed by: FAMILY MEDICINE

## 2023-07-02 NOTE — LETTER
Patient: Noy Ochoa  : 1940    Encounter Date: 2023    Acute visit    CC Covid 19 Ac resp failure    HPI  Ms. Ochoa is an 82 year old female with a PMH of HTN, HLD, and vascular  dementia who was presented to the ED with c/o SOB. On arrival to the ED, she  was mildly tachycardic, with SPO2 low normal, otherwise her vital signs were  within normal limits. Blood examination obtained was remarkable for  hyperglycemia, hypokalemia, and elevated troponin (40->37). An EKG was without  obvious signs of ischemia. She was given 500 mg of Zithromax, 6mg IV  dexamethasone, 20 meq IV K+, 40 meq PO K+, 2gm Rocephin, and 1L LR bolus. A CTA  PE was negative for PE, however did demonstrated bilateral scattered  ground-glass, intersitial, and consolidative opacities consistent with  multifocal pneumonia due to covid. Her home covid test was positive. She was  admitted to the medicine service as inpatient with telemetry for further  treatment and monitoring.  Today she is sitting in bed Uneventful night    ROS 14 systems reviewed negative except above    Active Problems  Problems   · Abnormal EEG (794.02) (R94.01)   · Abnormal EKG (794.31) (R94.31)   · Abnormal glucose (790.29) (R73.09)   · Acquired claw toe of right foot (735.5) (M20.5X1)   · Acute lower UTI (599.0) (N39.0)   · Arthralgia of pelvis or thigh (719.45) (M25.559)   · Astigmatism (367.20) (H52.209)   · Astigmatism of both eyes with presbyopia (367.20,367.4) (H52.203,H52.4)   · Ataxic gait (781.2) (R26.0)   · Ataxic gait (781.2) (R26.0)   · Benign essential hypertension (401.1) (I10)   · Cataract, nuclear sclerotic, left eye (366.16) (H25.12)   · Cataract, nuclear sclerotic, right eye (366.16) (H25.11)   · Cerebrovascular disease (437.9) (I67.9)   · Cognitive disorder (294.9) (F09)   · Colon cancer screening (V76.51) (Z12.11)   · Combined form of age-related cataract, both eyes (366.19) (H25.813)   · Dementia (294.20) (F03.90)   · Depression (311)  (F32.A)   · in remission with ongoing treatment   · Elevated low density lipoprotein (LDL) cholesterol level (272.0) (E78.00)   · Encounter for screening for malignant neoplasm of colon (V76.51) (Z12.11)   · Encounter for screening mammogram for breast cancer (V76.12) (Z12.31)   · Essential hypertension (401.9) (I10)   · Excess ear wax (380.4) (H61.20)   · Excessive cerumen in both ear canals (380.4) (H61.23)   · Executive function deficit (799.55) (R41.844)   · Falls (E888.9) (W19.XXXA)   · Fatigue (780.79) (R53.83)   · 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)   · Hallux rigidus of right foot (735.2) (M20.21)   · Hallux valgus of right foot (735.0) (M20.11)   · Hammer toe (735.4) (M20.40)   · Hip pain (719.45) (M25.559)   · Hypercholesterolemia (272.0) (E78.00)   · Hyperopia (367.0) (H52.00)   · Impacted cerumen of left ear (380.4) (H61.22)   · LBBB (left bundle branch block) (426.3) (I44.7)   · Low vitamin D level (790.6) (R79.89)   · Mild cognitive impairment (331.83) (G31.84)   · non amnestic   · Multiple acquired skin tags (701.9) (L91.8)   · Myalgia (729.1) (M79.10)   · OA (osteoarthritis) of knee (715.36) (M17.9)   · OA (osteoarthritis) of knee (715.36) (M17.9)   · Osteoporosis (733.00) (M81.0)   · Overactive bladder (596.51) (N32.81)   · Pelvic fracture (808.8) (S32.9XXA)   · Pelvic fracture (808.8) (S32.9XXA)   · Poor balance (781.99) (R26.89)   · Primary osteoarthritis of left knee (715.16) (M17.12)   · Recurrent falls (V15.88) (R29.6)   · Refraction error (367.9) (H52.7)   · Screening for colorectal cancer (V76.51,V76.41) (Z12.11,Z12.12)   · Screening for osteoporosis (V82.81) (Z13.820)   · Screening mammogram, encounter for (V76.12) (Z12.31)   · Skin lesion, superficial (709.9) (L98.9)   · Traumatic brain injury (854.00) (S06.9XAA)   · Urinary incontinence (788.30) (R32)   · UTI symptoms (788.99) (R39.9)   · Vascular dementia (290.40) (F01.50)   · Visit for screening mammogram (V76.12) (Z12.31)   · Vitamin  D insufficiency (268.9) (E55.9)   · Weakness of both legs (729.89) (R29.898)   · Weight gain (783.1) (R63.5)    Past Medical History  Problems   · History of Combined form of age-related cataract, left eye (366.19) (H25.812)   · History of Combined form of age-related cataract, right eye (366.19) (H25.811)   · Depression (311) (F32.A)   · in remission with ongoing treatment    Surgical History  Problems   · History of Cholecystectomy   · History of Hysterectomy   · History of Tonsillectomy    Family History  Mother   · Family history of Colon Cancer (V16.0)   · Family history of Diabetes Mellitus (V18.0)   · Family history of Hypertension (V17.49)  Father   · Family history of Pick's disease   · confirmed via autopsy  Brother   · Family history of Acute Myocardial Infarction (V17.3)   · Family history of Diabetes Mellitus (V18.0)   · Family history of Stroke Syndrome (V17.1)    Social History  Problems   · Education Level: Graduate school   · Marital History - Single   · Never a smoker   · Never Drank Alcohol   · Never smoker   · Non-smoker (V49.89) (Z78.9)   · Occupation: Retired   · retired teacher    Allergies  Medication   · Aspirin TABS  Recorded By: Digna Osman; 10/11/2013 12:46:29 PM   · david  Recorded By: Katty Azevedo; 10/19/2016 9:24:13 AM   · Hops Flower POWD  Recorded By: Essence Villanueva; 5/8/2019 11:04:34 AM   · Onion Extract POWD  Recorded By: Katty Azevedo; 10/19/2016 9:24:13 AM  NonMedication   · Latex  Recorded By: Agnieszka Snyder; 2/7/2014 11:53:52 AM    Physical Exam by System:    Constitutional: Well developed, awake/alert/oriented x3, no distress on RA,  alert and cooperative  Eyes: PERRL, EOMI, clear sclera  ENMT: mucous membranes moist, no apparent injury, no lesions seen  Head/Neck: Neck supple, no apparent injury, thyroid without mass or tenderness,  No JVD, trachea midline, no bruits  Respiratory/Thorax: Patent airways, CTAB, normal breath sounds, no wheezes,  Cardiovascular:  Regular, rate and rhythm, no murmurs, normal S 1and S 2  Gastrointestinal: Nondistended, soft, non-tender, no rebound tenderness or  guarding, no masses palpable, no organomegaly, +BS,  Musculoskeletal: ROM intact, no joint swelling,  Extremities: normal extremities, no ankle edema, contusions or wounds, no  clubbing  Neurological: alert and oriented x3, intact senses, motor, response and  reflexes, normal strength  Psychological: Appropriate mood and behavior  Skin: Warm and dry, no lesions, no rashes    Results:  CBC: 6/23/2023 11:40    \ Hgb / \ 14.1 /  WBC ---------------- Plt 14.1 H ---------------- 354   / Hct \ / 40.7 \    RBC: 4.46 MCV: 91    CMP: 6/23/2023 11:40  NA+  Cl-  BUN / 137  102  15 /  -------------------------------- Glucose  --------------------------- 115 H   K+  HCO3-  Creat \ 4.2  24  0.73 \    \ T Bili / \ 0.7 /  AST x ---- x ALT 25 x ---- x 45   / Alk P \ / 59 \  Calcium : 9.2 Anion Gap : 15 Albumin : 3.8 T Protein : 6.8    Assessment and Plan:  Comorbidities:   Comorbidity Other    Code Status:   Code Status Full Code    Assessment:  Ms. Ochoa is an 82 year old female with a PMH of HTN, HLD, and vascular  dementia who is presented to the ED with shortness of breath. On arrival to the  ED, she is mildly tachycardic, with SPO2 low normal, but otherwise her vital  signs are within normal limits. Blood work obtained was remarkable for  hyperglycemia, severe hypokalemia, and elevated troponin (40->37). An EKG was  without obvious signs of ischemia. She was given 500mg of Zithromax, 6mg IV  dexamethasone, 20 meq IV K+, 40 meq PO K+, 2gm Rocephin, and 1L LR bolus. A CTA  PE was negative for PE, however did demonstrated bilateral scattered  ground-glass, intersitial, and consolidative opacities consistent with  multifocal pneumonia. She is admitted to the medicine service as inpatient with  telemetry for further treatment and monitoring.    Acute hypoxic respiratory failure  Covid-19  pneumonia  - Completed Remdesivir  - CTA PE -ve for PE  - Bilateral scattered GGO c/w multifocal infection on CTA PE  - Rocephin, Zithromax given in ED, continue  - Heparin SQ DVT chemoppx  -Benzonatate 200mg TID for cough  -Patient denies any SOB at this time,    HTN  BP is stable,  - Continue home medications,    Hypokalemia,  improved    Troponin elevation  - Downtrended  - No EKG changes  - Suspect demand mismatch due to hypoxia    physical deconditioning OT/PT    CBC BMP wedensday    Problem List Items Addressed This Visit       COVID-19    Acute respiratory failure with hypoxia (CMS/HCC) - Primary    Multifocal pneumonia    Mild vascular dementia without behavioral disturbance, psychotic disturbance, mood disturbance, or anxiety (CMS/HCC)    Benign essential hypertension    Hypokalemia               PLAN:Reviewed orders, medications, records, and pertinent labs/x-rays from   hospital. Monitor VS, BS, O2, etc as per protocol. See written orders. PT/OT   will evaluate and start appropriate rehabilitation program. Reviewed and signed   off orders, medications,Labs, x-rays, and current diagnoses. Reviewed and   updated CPR status and any changes in Advanced directives. Continue Rehab Will   see 1-2 times weekly for next 30 days then reassess. Will always see at   resident, family , or nursing request. Discharge Planning   Time   Time Spent With Patient: 45 minutes of which greater than 50 percent was spent   counseling and or coordinating care.    Electronically Signed Yunior Del Toro MD      Electronically Signed By: Yunior Del Toro MD   7/21/23  5:00 PM

## 2023-07-04 ENCOUNTER — NURSING HOME VISIT (OUTPATIENT)
Dept: POST ACUTE CARE | Facility: EXTERNAL LOCATION | Age: 83
End: 2023-07-04
Payer: MEDICARE

## 2023-07-04 DIAGNOSIS — U07.1 COVID-19: ICD-10-CM

## 2023-07-04 DIAGNOSIS — I10 BENIGN ESSENTIAL HYPERTENSION: ICD-10-CM

## 2023-07-04 DIAGNOSIS — J18.9 MULTIFOCAL PNEUMONIA: ICD-10-CM

## 2023-07-04 DIAGNOSIS — F01.A0 MILD VASCULAR DEMENTIA WITHOUT BEHAVIORAL DISTURBANCE, PSYCHOTIC DISTURBANCE, MOOD DISTURBANCE, OR ANXIETY (MULTI): Primary | ICD-10-CM

## 2023-07-04 DIAGNOSIS — E87.6 HYPOKALEMIA: ICD-10-CM

## 2023-07-04 DIAGNOSIS — R53.81 PHYSICAL DECONDITIONING: ICD-10-CM

## 2023-07-04 DIAGNOSIS — J96.01 ACUTE RESPIRATORY FAILURE WITH HYPOXIA (MULTI): ICD-10-CM

## 2023-07-04 PROCEDURE — 99309 SBSQ NF CARE MODERATE MDM 30: CPT | Performed by: FAMILY MEDICINE

## 2023-07-04 NOTE — LETTER
Patient: Noy Ochoa  : 1940    Encounter Date: 2023    Acute visit    CC Covid 19 Ac resp failure    HPI  Ms. Ochoa is an 82 year old female with a PMH of HTN, HLD, and vascular  dementia who was presented to the ED with c/o SOB. On arrival to the ED, she  was mildly tachycardic, with SPO2 low normal, otherwise her vital signs were  within normal limits. Blood examination obtained was remarkable for  hyperglycemia, hypokalemia, and elevated troponin (40->37). An EKG was without  obvious signs of ischemia. She was given 500 mg of Zithromax, 6mg IV  dexamethasone, 20 meq IV K+, 40 meq PO K+, 2gm Rocephin, and 1L LR bolus. A CTA  PE was negative for PE, however did demonstrated bilateral scattered  ground-glass, intersitial, and consolidative opacities consistent with  multifocal pneumonia due to covid. Her home covid test was positive. She was  admitted to the medicine service as inpatient with telemetry for further  treatment and monitoring.  Today she is sitting in bed Uneventful night    ROS 14 systems reviewed negative except above    Active Problems  Problems   · Abnormal EEG (794.02) (R94.01)   · Abnormal EKG (794.31) (R94.31)   · Abnormal glucose (790.29) (R73.09)   · Acquired claw toe of right foot (735.5) (M20.5X1)   · Acute lower UTI (599.0) (N39.0)   · Arthralgia of pelvis or thigh (719.45) (M25.559)   · Astigmatism (367.20) (H52.209)   · Astigmatism of both eyes with presbyopia (367.20,367.4) (H52.203,H52.4)   · Ataxic gait (781.2) (R26.0)   · Ataxic gait (781.2) (R26.0)   · Benign essential hypertension (401.1) (I10)   · Cataract, nuclear sclerotic, left eye (366.16) (H25.12)   · Cataract, nuclear sclerotic, right eye (366.16) (H25.11)   · Cerebrovascular disease (437.9) (I67.9)   · Cognitive disorder (294.9) (F09)   · Colon cancer screening (V76.51) (Z12.11)   · Combined form of age-related cataract, both eyes (366.19) (H25.813)   · Dementia (294.20) (F03.90)   · Depression (311)  (F32.A)   · in remission with ongoing treatment   · Elevated low density lipoprotein (LDL) cholesterol level (272.0) (E78.00)   · Encounter for screening for malignant neoplasm of colon (V76.51) (Z12.11)   · Encounter for screening mammogram for breast cancer (V76.12) (Z12.31)   · Essential hypertension (401.9) (I10)   · Excess ear wax (380.4) (H61.20)   · Excessive cerumen in both ear canals (380.4) (H61.23)   · Executive function deficit (799.55) (R41.844)   · Falls (E888.9) (W19.XXXA)   · Fatigue (780.79) (R53.83)   · 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)   · Hallux rigidus of right foot (735.2) (M20.21)   · Hallux valgus of right foot (735.0) (M20.11)   · Hammer toe (735.4) (M20.40)   · Hip pain (719.45) (M25.559)   · Hypercholesterolemia (272.0) (E78.00)   · Hyperopia (367.0) (H52.00)   · Impacted cerumen of left ear (380.4) (H61.22)   · LBBB (left bundle branch block) (426.3) (I44.7)   · Low vitamin D level (790.6) (R79.89)   · Mild cognitive impairment (331.83) (G31.84)   · non amnestic   · Multiple acquired skin tags (701.9) (L91.8)   · Myalgia (729.1) (M79.10)   · OA (osteoarthritis) of knee (715.36) (M17.9)   · OA (osteoarthritis) of knee (715.36) (M17.9)   · Osteoporosis (733.00) (M81.0)   · Overactive bladder (596.51) (N32.81)   · Pelvic fracture (808.8) (S32.9XXA)   · Pelvic fracture (808.8) (S32.9XXA)   · Poor balance (781.99) (R26.89)   · Primary osteoarthritis of left knee (715.16) (M17.12)   · Recurrent falls (V15.88) (R29.6)   · Refraction error (367.9) (H52.7)   · Screening for colorectal cancer (V76.51,V76.41) (Z12.11,Z12.12)   · Screening for osteoporosis (V82.81) (Z13.820)   · Screening mammogram, encounter for (V76.12) (Z12.31)   · Skin lesion, superficial (709.9) (L98.9)   · Traumatic brain injury (854.00) (S06.9XAA)   · Urinary incontinence (788.30) (R32)   · UTI symptoms (788.99) (R39.9)   · Vascular dementia (290.40) (F01.50)   · Visit for screening mammogram (V76.12) (Z12.31)   · Vitamin  D insufficiency (268.9) (E55.9)   · Weakness of both legs (729.89) (R29.898)   · Weight gain (783.1) (R63.5)    Past Medical History  Problems   · History of Combined form of age-related cataract, left eye (366.19) (H25.812)   · History of Combined form of age-related cataract, right eye (366.19) (H25.811)   · Depression (311) (F32.A)   · in remission with ongoing treatment    Surgical History  Problems   · History of Cholecystectomy   · History of Hysterectomy   · History of Tonsillectomy    Family History  Mother   · Family history of Colon Cancer (V16.0)   · Family history of Diabetes Mellitus (V18.0)   · Family history of Hypertension (V17.49)  Father   · Family history of Pick's disease   · confirmed via autopsy  Brother   · Family history of Acute Myocardial Infarction (V17.3)   · Family history of Diabetes Mellitus (V18.0)   · Family history of Stroke Syndrome (V17.1)    Social History  Problems   · Education Level: Graduate school   · Marital History - Single   · Never a smoker   · Never Drank Alcohol   · Never smoker   · Non-smoker (V49.89) (Z78.9)   · Occupation: Retired   · retired teacher    Allergies  Medication   · Aspirin TABS  Recorded By: Digna Osman; 10/11/2013 12:46:29 PM   · david  Recorded By: Katty Azevedo; 10/19/2016 9:24:13 AM   · Hops Flower POWD  Recorded By: Essence Villanueva; 5/8/2019 11:04:34 AM   · Onion Extract POWD  Recorded By: Katty Azevedo; 10/19/2016 9:24:13 AM  NonMedication   · Latex  Recorded By: Agnieszka Snyder; 2/7/2014 11:53:52 AM    Physical Exam by System:    Constitutional: Well developed, awake/alert/oriented x3, no distress on RA,  alert and cooperative  Eyes: PERRL, EOMI, clear sclera  ENMT: mucous membranes moist, no apparent injury, no lesions seen  Head/Neck: Neck supple, no apparent injury, thyroid without mass or tenderness,  No JVD, trachea midline, no bruits  Respiratory/Thorax: Patent airways, CTAB, normal breath sounds, no wheezes,  Cardiovascular:  Regular, rate and rhythm, no murmurs, normal S 1and S 2  Gastrointestinal: Nondistended, soft, non-tender, no rebound tenderness or  guarding, no masses palpable, no organomegaly, +BS,  Musculoskeletal: ROM intact, no joint swelling,  Extremities: normal extremities, no ankle edema, contusions or wounds, no  clubbing  Neurological: alert and oriented x3, intact senses, motor, response and  reflexes, normal strength  Psychological: Appropriate mood and behavior  Skin: Warm and dry, no lesions, no rashes    Results:  CBC: 6/23/2023 11:40    \ Hgb / \ 14.1 /  WBC ---------------- Plt 14.1 H ---------------- 354   / Hct \ / 40.7 \    RBC: 4.46 MCV: 91    CMP: 6/23/2023 11:40  NA+  Cl-  BUN / 137  102  15 /  -------------------------------- Glucose  --------------------------- 115 H   K+  HCO3-  Creat \ 4.2  24  0.73 \    \ T Bili / \ 0.7 /  AST x ---- x ALT 25 x ---- x 45   / Alk P \ / 59 \  Calcium : 9.2 Anion Gap : 15 Albumin : 3.8 T Protein : 6.8    Assessment and Plan:  Comorbidities:   Comorbidity Other    Code Status:   Code Status Full Code    Assessment:  Ms. Ochoa is an 82 year old female with a PMH of HTN, HLD, and vascular  dementia who is presented to the ED with shortness of breath. On arrival to the  ED, she is mildly tachycardic, with SPO2 low normal, but otherwise her vital  signs are within normal limits. Blood work obtained was remarkable for  hyperglycemia, severe hypokalemia, and elevated troponin (40->37). An EKG was  without obvious signs of ischemia. She was given 500mg of Zithromax, 6mg IV  dexamethasone, 20 meq IV K+, 40 meq PO K+, 2gm Rocephin, and 1L LR bolus. A CTA  PE was negative for PE, however did demonstrated bilateral scattered  ground-glass, intersitial, and consolidative opacities consistent with  multifocal pneumonia. She is admitted to the medicine service as inpatient with  telemetry for further treatment and monitoring.    Acute hypoxic respiratory failure  Covid-19  pneumonia  - Completed Remdesivir  - CTA PE -ve for PE  - Bilateral scattered GGO c/w multifocal infection on CTA PE  - Rocephin, Zithromax given in ED, continue  - Heparin SQ DVT chemoppx  -Benzonatate 200mg TID for cough  -Patient denies any SOB at this time,    HTN  BP is stable,  - Continue home medications,    Hypokalemia,  improved    Troponin elevation  - Downtrended  - No EKG changes  - Suspect demand mismatch due to hypoxia    physical deconditioning OT/PT    CBC BMP wedensday    Problem List Items Addressed This Visit       COVID-19    Acute respiratory failure with hypoxia (CMS/HCC)    Multifocal pneumonia    Physical deconditioning    Mild vascular dementia without behavioral disturbance, psychotic disturbance, mood disturbance, or anxiety (CMS/HCC) - Primary    Benign essential hypertension    Hypokalemia                 PLAN:Reviewed orders, medications, records, and pertinent labs/x-rays from   hospital. Monitor VS, BS, O2, etc as per protocol. See written orders. PT/OT   will evaluate and start appropriate rehabilitation program. Reviewed and signed   off orders, medications,Labs, x-rays, and current diagnoses. Reviewed and   updated CPR status and any changes in Advanced directives. Continue Rehab Will   see 1-2 times weekly for next 30 days then reassess. Will always see at   resident, family , or nursing request. Discharge Planning   Time   Time Spent With Patient: 45 minutes of which greater than 50 percent was spent   counseling and or coordinating care.    Electronically Signed Yunior Del Toro MD      Electronically Signed By: Yunior Del Toro MD   7/21/23  5:05 PM

## 2023-07-06 ENCOUNTER — NURSING HOME VISIT (OUTPATIENT)
Dept: POST ACUTE CARE | Facility: EXTERNAL LOCATION | Age: 83
End: 2023-07-06
Payer: MEDICARE

## 2023-07-06 DIAGNOSIS — K21.9 GASTROESOPHAGEAL REFLUX DISEASE WITHOUT ESOPHAGITIS: ICD-10-CM

## 2023-07-06 DIAGNOSIS — I10 BENIGN ESSENTIAL HYPERTENSION: ICD-10-CM

## 2023-07-06 DIAGNOSIS — N32.81 OAB (OVERACTIVE BLADDER): ICD-10-CM

## 2023-07-06 DIAGNOSIS — F01.A0 MILD VASCULAR DEMENTIA WITHOUT BEHAVIORAL DISTURBANCE, PSYCHOTIC DISTURBANCE, MOOD DISTURBANCE, OR ANXIETY (MULTI): Primary | ICD-10-CM

## 2023-07-06 PROCEDURE — 99316 NF DSCHRG MGMT 30 MIN+: CPT | Performed by: FAMILY MEDICINE

## 2023-07-06 RX ORDER — AMLODIPINE BESYLATE 2.5 MG/1
2.5 TABLET ORAL DAILY
Qty: 30 TABLET | Refills: 0 | Status: SHIPPED | OUTPATIENT
Start: 2023-07-06 | End: 2024-05-20

## 2023-07-06 RX ORDER — ATORVASTATIN CALCIUM 40 MG/1
1 TABLET, FILM COATED ORAL DAILY
COMMUNITY
Start: 2014-05-07 | End: 2023-07-06 | Stop reason: SDUPTHER

## 2023-07-06 RX ORDER — ALENDRONATE SODIUM 70 MG/1
70 TABLET ORAL
Qty: 4 TABLET | Refills: 0 | Status: SHIPPED | OUTPATIENT
Start: 2023-07-06

## 2023-07-06 RX ORDER — ALENDRONATE SODIUM 70 MG/1
70 TABLET ORAL
COMMUNITY
Start: 2018-11-01 | End: 2023-07-06 | Stop reason: SDUPTHER

## 2023-07-06 RX ORDER — SERTRALINE HYDROCHLORIDE 100 MG/1
200 TABLET, FILM COATED ORAL DAILY
Qty: 30 TABLET | Refills: 0 | Status: SHIPPED | OUTPATIENT
Start: 2023-07-06 | End: 2023-12-06

## 2023-07-06 RX ORDER — ATORVASTATIN CALCIUM 40 MG/1
40 TABLET, FILM COATED ORAL DAILY
Qty: 30 TABLET | Refills: 0 | Status: SHIPPED | OUTPATIENT
Start: 2023-07-06 | End: 2024-05-20

## 2023-07-06 RX ORDER — PANTOPRAZOLE SODIUM 40 MG/1
40 TABLET, DELAYED RELEASE ORAL DAILY
Qty: 30 TABLET | Refills: 0 | Status: SHIPPED | OUTPATIENT
Start: 2023-07-06 | End: 2024-05-02 | Stop reason: HOSPADM

## 2023-07-06 RX ORDER — OXYBUTYNIN CHLORIDE 5 MG/1
5 TABLET ORAL DAILY
Qty: 30 TABLET | Refills: 0 | Status: SHIPPED | OUTPATIENT
Start: 2023-07-06 | End: 2023-10-19 | Stop reason: SINTOL

## 2023-07-06 RX ORDER — AMLODIPINE BESYLATE 2.5 MG/1
1 TABLET ORAL DAILY
COMMUNITY
Start: 2020-02-19 | End: 2023-07-06 | Stop reason: SDUPTHER

## 2023-07-06 RX ORDER — SERTRALINE HYDROCHLORIDE 100 MG/1
2 TABLET, FILM COATED ORAL DAILY
COMMUNITY
Start: 2014-04-08 | End: 2023-07-06 | Stop reason: SDUPTHER

## 2023-07-06 NOTE — LETTER
Patient: Noy Ochoa  : 1940    Encounter Date: 2023    Discharge visit    CC Covid 19 Ac resp failure    HPI  Ms. Ochoa is an 82 year old female with a PMH of HTN, HLD, and vascular  dementia who was presented to the ED with c/o SOB. On arrival to the ED, she  was mildly tachycardic, with SPO2 low normal, otherwise her vital signs were  within normal limits. Blood examination obtained was remarkable for  hyperglycemia, hypokalemia, and elevated troponin (40->37). An EKG was without  obvious signs of ischemia. She was given 500 mg of Zithromax, 6mg IV  dexamethasone, 20 meq IV K+, 40 meq PO K+, 2gm Rocephin, and 1L LR bolus. A CTA  PE was negative for PE, however did demonstrated bilateral scattered  ground-glass, intersitial, and consolidative opacities consistent with  multifocal pneumonia due to covid. Her home covid test was positive. She was  admitted to the medicine service as inpatient with telemetry for further  treatment and monitoring.  Today she is sitting in bed Uneventful night  To be dc home in AM    ROS 14 systems reviewed negative except above    Active Problems  Problems   · Abnormal EEG (794.02) (R94.01)   · Abnormal EKG (794.31) (R94.31)   · Abnormal glucose (790.29) (R73.09)   · Acquired claw toe of right foot (735.5) (M20.5X1)   · Acute lower UTI (599.0) (N39.0)   · Arthralgia of pelvis or thigh (719.45) (M25.559)   · Astigmatism (367.20) (H52.209)   · Astigmatism of both eyes with presbyopia (367.20,367.4) (H52.203,H52.4)   · Ataxic gait (781.2) (R26.0)   · Ataxic gait (781.2) (R26.0)   · Benign essential hypertension (401.1) (I10)   · Cataract, nuclear sclerotic, left eye (366.16) (H25.12)   · Cataract, nuclear sclerotic, right eye (366.16) (H25.11)   · Cerebrovascular disease (437.9) (I67.9)   · Cognitive disorder (294.9) (F09)   · Colon cancer screening (V76.51) (Z12.11)   · Combined form of age-related cataract, both eyes (366.19) (H25.813)   · Dementia (294.20) (F03.90)   ·  Depression (311) (F32.A)   · in remission with ongoing treatment   · Elevated low density lipoprotein (LDL) cholesterol level (272.0) (E78.00)   · Encounter for screening for malignant neoplasm of colon (V76.51) (Z12.11)   · Encounter for screening mammogram for breast cancer (V76.12) (Z12.31)   · Essential hypertension (401.9) (I10)   · Excess ear wax (380.4) (H61.20)   · Excessive cerumen in both ear canals (380.4) (H61.23)   · Executive function deficit (799.55) (R41.844)   · Falls (E888.9) (W19.XXXA)   · Fatigue (780.79) (R53.83)   · 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)   · Hallux rigidus of right foot (735.2) (M20.21)   · Hallux valgus of right foot (735.0) (M20.11)   · Hammer toe (735.4) (M20.40)   · Hip pain (719.45) (M25.559)   · Hypercholesterolemia (272.0) (E78.00)   · Hyperopia (367.0) (H52.00)   · Impacted cerumen of left ear (380.4) (H61.22)   · LBBB (left bundle branch block) (426.3) (I44.7)   · Low vitamin D level (790.6) (R79.89)   · Mild cognitive impairment (331.83) (G31.84)   · non amnestic   · Multiple acquired skin tags (701.9) (L91.8)   · Myalgia (729.1) (M79.10)   · OA (osteoarthritis) of knee (715.36) (M17.9)   · OA (osteoarthritis) of knee (715.36) (M17.9)   · Osteoporosis (733.00) (M81.0)   · Overactive bladder (596.51) (N32.81)   · Pelvic fracture (808.8) (S32.9XXA)   · Pelvic fracture (808.8) (S32.9XXA)   · Poor balance (781.99) (R26.89)   · Primary osteoarthritis of left knee (715.16) (M17.12)   · Recurrent falls (V15.88) (R29.6)   · Refraction error (367.9) (H52.7)   · Screening for colorectal cancer (V76.51,V76.41) (Z12.11,Z12.12)   · Screening for osteoporosis (V82.81) (Z13.820)   · Screening mammogram, encounter for (V76.12) (Z12.31)   · Skin lesion, superficial (709.9) (L98.9)   · Traumatic brain injury (854.00) (S06.9XAA)   · Urinary incontinence (788.30) (R32)   · UTI symptoms (788.99) (R39.9)   · Vascular dementia (290.40) (F01.50)   · Visit for screening mammogram (V76.12)  (Z12.31)   · Vitamin D insufficiency (268.9) (E55.9)   · Weakness of both legs (729.89) (R29.898)   · Weight gain (783.1) (R63.5)    Past Medical History  Problems   · History of Combined form of age-related cataract, left eye (366.19) (H25.812)   · History of Combined form of age-related cataract, right eye (366.19) (H25.811)   · Depression (311) (F32.A)   · in remission with ongoing treatment    Surgical History  Problems   · History of Cholecystectomy   · History of Hysterectomy   · History of Tonsillectomy    Family History  Mother   · Family history of Colon Cancer (V16.0)   · Family history of Diabetes Mellitus (V18.0)   · Family history of Hypertension (V17.49)  Father   · Family history of Pick's disease   · confirmed via autopsy  Brother   · Family history of Acute Myocardial Infarction (V17.3)   · Family history of Diabetes Mellitus (V18.0)   · Family history of Stroke Syndrome (V17.1)    Social History  Problems   · Education Level: Graduate school   · Marital History - Single   · Never a smoker   · Never Drank Alcohol   · Never smoker   · Non-smoker (V49.89) (Z78.9)   · Occupation: Retired   · retired teacher    Allergies  Medication   · Aspirin TABS  Recorded By: Digna Osman; 10/11/2013 12:46:29 PM   · david  Recorded By: Katty Azevedo; 10/19/2016 9:24:13 AM   · Hops Flower POWD  Recorded By: Essence Villanueva; 5/8/2019 11:04:34 AM   · Onion Extract POWD  Recorded By: Katty Azevedo; 10/19/2016 9:24:13 AM  NonMedication   · Latex  Recorded By: Agnieszka Snyder; 2/7/2014 11:53:52 AM    Physical Exam by System:    Constitutional: Well developed, awake/alert/oriented x3, no distress on RA,  alert and cooperative  Eyes: PERRL, EOMI, clear sclera  ENMT: mucous membranes moist, no apparent injury, no lesions seen  Head/Neck: Neck supple, no apparent injury, thyroid without mass or tenderness,  No JVD, trachea midline, no bruits  Respiratory/Thorax: Patent airways, CTAB, normal breath sounds, no  wheezes,  Cardiovascular: Regular, rate and rhythm, no murmurs, normal S 1and S 2  Gastrointestinal: Nondistended, soft, non-tender, no rebound tenderness or  guarding, no masses palpable, no organomegaly, +BS,  Musculoskeletal: ROM intact, no joint swelling,  Extremities: normal extremities, no ankle edema, contusions or wounds, no  clubbing  Neurological: alert and oriented x3, intact senses, motor, response and  reflexes, normal strength  Psychological: Appropriate mood and behavior  Skin: Warm and dry, no lesions, no rashes    Results:  CBC: 6/23/2023 11:40    \ Hgb / \ 14.1 /  WBC ---------------- Plt 14.1 H ---------------- 354   / Hct \ / 40.7 \    RBC: 4.46 MCV: 91    CMP: 6/23/2023 11:40  NA+  Cl-  BUN / 137  102  15 /  -------------------------------- Glucose  --------------------------- 115 H   K+  HCO3-  Creat \ 4.2  24  0.73 \    \ T Bili / \ 0.7 /  AST x ---- x ALT 25 x ---- x 45   / Alk P \ / 59 \  Calcium : 9.2 Anion Gap : 15 Albumin : 3.8 T Protein : 6.8    Assessment and Plan:  Comorbidities:   Comorbidity Other    Code Status:   Code Status Full Code    Assessment:  Ms. Ochoa is an 82 year old female with a PMH of HTN, HLD, and vascular  dementia who is presented to the ED with shortness of breath. On arrival to the  ED, she is mildly tachycardic, with SPO2 low normal, but otherwise her vital  signs are within normal limits. Blood work obtained was remarkable for  hyperglycemia, severe hypokalemia, and elevated troponin (40->37). An EKG was  without obvious signs of ischemia. She was given 500mg of Zithromax, 6mg IV  dexamethasone, 20 meq IV K+, 40 meq PO K+, 2gm Rocephin, and 1L LR bolus. A CTA  PE was negative for PE, however did demonstrated bilateral scattered  ground-glass, intersitial, and consolidative opacities consistent with  multifocal pneumonia. She is admitted to the medicine service as inpatient with  telemetry for further treatment and monitoring.    Acute hypoxic  respiratory failure  Covid-19 pneumonia  - Completed Remdesivir  - CTA PE -ve for PE  - Bilateral scattered GGO c/w multifocal infection on CTA PE  - Rocephin, Zithromax given in ED, continue  - Heparin SQ DVT chemoppx  -Benzonatate 200mg TID for cough  -Patient denies any SOB at this time,    HTN  BP is stable,  - Continue home medications,    Hypokalemia,  improved    Troponin elevation  - Downtrended  - No EKG changes  - Suspect demand mismatch due to hypoxia    physical deconditioning OT/PT    CBC BMP wedensday    Problem List Items Addressed This Visit       Mild vascular dementia without behavioral disturbance, psychotic disturbance, mood disturbance, or anxiety (CMS/HCC) - Primary    Relevant Medications    sertraline (Zoloft) 100 mg tablet    Benign essential hypertension    Relevant Medications    amLODIPine (Norvasc) 2.5 mg tablet    atorvastatin (Lipitor) 40 mg tablet     Other Visit Diagnoses       OAB (overactive bladder)        Relevant Medications    oxybutynin (Ditropan) 5 mg tablet    Gastroesophageal reflux disease without esophagitis        Relevant Medications    pantoprazole (ProtoNix) 40 mg EC tablet    alendronate (Fosamax) 70 mg tablet             Assessment: first home care visit: 1-2 days after discharge.   Status upon Discharge: Stable for discharge and improved from admission.   Discharge to home in stable and improved condition.  Completed rehab program and was discharged. Had functional improvement when compared to admission and is felt medically and physically stable to be discharged from our rehab facility. Please see individual recommendations from the respective therapy disciplines for activity and/or restrictions. Medically stable for discharge at this time.    Instructions given, Rx's escribed, meds reviewed, appropriate follow-up, and due to diagnosis listed in discharge patient is considered home bound. Referred for HHC, as arranged per , for RN, PT, and OT, if  indicated. See discharge instructions. Family aware and notified of DC information. Recommend contacting PCP for appt in 7-10 days for follow-up on conditions treated in hospital and in rehab facility.   Referred for C as arranged per .   Face to Face Certification: face to face encounter completed   Medical Necessity for Home care: Short term nursing follow up is needed to monitor for signs and symptoms of   decompensations/adverse events. Rehab services to improve function and establish home exercise program.   Skilled Disciplines ordered: RN/PT/OT/SP as indicated.   Home care skilled service: assessment, rehab (PT/OT/SP eval and treat).   Homebound status: homebound.   Homebound status due to: diagnosis listed above.      Electronically Signed By: Yunior Del Toro MD   7/21/23 11:02 AM

## 2023-07-14 ENCOUNTER — OFFICE VISIT (OUTPATIENT)
Dept: PRIMARY CARE | Facility: CLINIC | Age: 83
End: 2023-07-14
Payer: MEDICARE

## 2023-07-14 ENCOUNTER — TELEPHONE (OUTPATIENT)
Dept: PRIMARY CARE | Facility: CLINIC | Age: 83
End: 2023-07-14

## 2023-07-14 DIAGNOSIS — K21.9 GASTROESOPHAGEAL REFLUX DISEASE WITHOUT ESOPHAGITIS: ICD-10-CM

## 2023-07-14 DIAGNOSIS — F01.A0 MILD VASCULAR DEMENTIA WITHOUT BEHAVIORAL DISTURBANCE, PSYCHOTIC DISTURBANCE, MOOD DISTURBANCE, OR ANXIETY (MULTI): ICD-10-CM

## 2023-07-14 DIAGNOSIS — E78.2 MIXED HYPERLIPIDEMIA: ICD-10-CM

## 2023-07-14 DIAGNOSIS — J18.9 MULTIFOCAL PNEUMONIA: ICD-10-CM

## 2023-07-14 DIAGNOSIS — I10 BENIGN ESSENTIAL HYPERTENSION: ICD-10-CM

## 2023-07-14 DIAGNOSIS — Z00.00 HEALTH CARE MAINTENANCE: Primary | ICD-10-CM

## 2023-07-14 PROCEDURE — 1170F FXNL STATUS ASSESSED: CPT | Performed by: INTERNAL MEDICINE

## 2023-07-14 PROCEDURE — 1159F MED LIST DOCD IN RCRD: CPT | Performed by: INTERNAL MEDICINE

## 2023-07-14 PROCEDURE — 99397 PER PM REEVAL EST PAT 65+ YR: CPT | Performed by: INTERNAL MEDICINE

## 2023-07-14 PROCEDURE — G0439 PPPS, SUBSEQ VISIT: HCPCS | Performed by: INTERNAL MEDICINE

## 2023-07-14 PROCEDURE — 1160F RVW MEDS BY RX/DR IN RCRD: CPT | Performed by: INTERNAL MEDICINE

## 2023-07-14 PROCEDURE — 1126F AMNT PAIN NOTED NONE PRSNT: CPT | Performed by: INTERNAL MEDICINE

## 2023-07-14 PROCEDURE — 99214 OFFICE O/P EST MOD 30 MIN: CPT | Performed by: INTERNAL MEDICINE

## 2023-07-14 PROCEDURE — 3078F DIAST BP <80 MM HG: CPT | Performed by: INTERNAL MEDICINE

## 2023-07-14 PROCEDURE — 1036F TOBACCO NON-USER: CPT | Performed by: INTERNAL MEDICINE

## 2023-07-14 PROCEDURE — 3075F SYST BP GE 130 - 139MM HG: CPT | Performed by: INTERNAL MEDICINE

## 2023-07-15 VITALS
DIASTOLIC BLOOD PRESSURE: 72 MMHG | RESPIRATION RATE: 14 BRPM | SYSTOLIC BLOOD PRESSURE: 131 MMHG | OXYGEN SATURATION: 97 % | HEART RATE: 100 BPM

## 2023-07-15 NOTE — PROGRESS NOTES
Subjective   Patient ID: Noy Ochoa is a 82 y.o. female who presents for No chief complaint on file..    HPI met patient for first time see updated front sheet family or patient here CPE and follow-up hospitalization COVID and pneumonia no current chest pain or shortness of breath living at home with maximal aid from family and /aide bowels normal no dysuria  Admitted June 12 discharged June 24, 2023 with COVID-pneumonia to skilled nursing facility her CT scan of the chest showed groundglass interstitial opacities she was negative for pulmonary embolus  Discharge medications Fosamax atorvastatin amlodipine pantoprazole oxybutynin sertraline 200 mg/day    Past medical history hypertension hyperlipidemia vascular dementia osteoporosis osteoarthritis urinary incontinence status post cholecystectomy status post hysterectomy    Medications see above    Allergies aspirin latex    Social history no tobacco no alcohol    Family history mother colon cancer diabetes hypertension father pix disease Brother ASCVD diabetes    Prevention Sub laboratory results reviewed discussed with mammogram we will hold on any other procedure at this time given recent pneumonia    Depression somewhat depressed is on therapy higher dose SSRI  Review of Systems    Objective   There were no vitals taken for this visit.    Physical Exam vital signs noted alert and oriented x3 with assist NCAT PERRLA EOMI nares without discharge OP benign TM normal bilateral EAC clear bilateral no AC nodes no JVD or bruit no thyromegaly chest clear to auscultation and percussion dry cough no wheezing CV regular rate and rhythm S1-S2 without murmur gallop or rub abdomen soft nontender normal active bowel sounds LS spine relatively straight and nontender spinous process negative straight leg raise extremities no clubbing cyanosis trace nonpitting edema intact distal pulses DTR 1+    Assessment/Plan impression General medical examination pneumonia  hypertension hyperlipidemia glucose intolerance depression cognitive impairment other diagnoses GERD urinary incontinence   plan recent laboratory results reviewed discussed with family May need additional home health care may make requisition for home care and follow-up good diet exercise as able care and safety in the home continue with assistance at home she is currently staying alone in the evening that may change continue with current medications agree with decrease of the oxybutynin from previously 10 mg per family to 5 mg she had seen Dr. Munoz in the past but is not currently on cognitive medication may revisit that may consider whether decreasing the sertraline may also be helpful for the combination of cognitive impairment and depression and anxiety continue with blood pressure medicine continue with cholesterol medication resume pantoprazole has at home may reconsider TSH and glycohemoglobin at next visit follow-up for screening afterwards and recheck 1 week TT 60 cc 31

## 2023-07-16 ASSESSMENT — PATIENT HEALTH QUESTIONNAIRE - PHQ9
SUM OF ALL RESPONSES TO PHQ9 QUESTIONS 1 AND 2: 1
10. IF YOU CHECKED OFF ANY PROBLEMS, HOW DIFFICULT HAVE THESE PROBLEMS MADE IT FOR YOU TO DO YOUR WORK, TAKE CARE OF THINGS AT HOME, OR GET ALONG WITH OTHER PEOPLE: SOMEWHAT DIFFICULT
2. FEELING DOWN, DEPRESSED OR HOPELESS: SEVERAL DAYS
1. LITTLE INTEREST OR PLEASURE IN DOING THINGS: NOT AT ALL

## 2023-07-16 ASSESSMENT — ACTIVITIES OF DAILY LIVING (ADL)
DRESSING: NEEDS ASSISTANCE
GROCERY_SHOPPING: NEEDS ASSISTANCE
MANAGING_FINANCES: NEEDS ASSISTANCE
DOING_HOUSEWORK: NEEDS ASSISTANCE
BATHING: UNABLE TO ASSESS
TAKING_MEDICATION: NEEDS ASSISTANCE

## 2023-07-16 ASSESSMENT — ENCOUNTER SYMPTOMS
LOSS OF SENSATION IN FEET: 0
DEPRESSION: 0
OCCASIONAL FEELINGS OF UNSTEADINESS: 1

## 2023-07-21 NOTE — PROGRESS NOTES
Acute visit    CC Covid 19 Ac resp failure    HPI  Ms. Ochao is an 82 year old female with a PMH of HTN, HLD, and vascular  dementia who was presented to the ED with c/o SOB. On arrival to the ED, she  was mildly tachycardic, with SPO2 low normal, otherwise her vital signs were  within normal limits. Blood examination obtained was remarkable for  hyperglycemia, hypokalemia, and elevated troponin (40->37). An EKG was without  obvious signs of ischemia. She was given 500 mg of Zithromax, 6mg IV  dexamethasone, 20 meq IV K+, 40 meq PO K+, 2gm Rocephin, and 1L LR bolus. A CTA  PE was negative for PE, however did demonstrated bilateral scattered  ground-glass, intersitial, and consolidative opacities consistent with  multifocal pneumonia due to covid. Her home covid test was positive. She was  admitted to the medicine service as inpatient with telemetry for further  treatment and monitoring.  Today she is sitting in bed Uneventful night    ROS 14 systems reviewed negative except above    Active Problems  Problems   · Abnormal EEG (794.02) (R94.01)   · Abnormal EKG (794.31) (R94.31)   · Abnormal glucose (790.29) (R73.09)   · Acquired claw toe of right foot (735.5) (M20.5X1)   · Acute lower UTI (599.0) (N39.0)   · Arthralgia of pelvis or thigh (719.45) (M25.559)   · Astigmatism (367.20) (H52.209)   · Astigmatism of both eyes with presbyopia (367.20,367.4) (H52.203,H52.4)   · Ataxic gait (781.2) (R26.0)   · Ataxic gait (781.2) (R26.0)   · Benign essential hypertension (401.1) (I10)   · Cataract, nuclear sclerotic, left eye (366.16) (H25.12)   · Cataract, nuclear sclerotic, right eye (366.16) (H25.11)   · Cerebrovascular disease (437.9) (I67.9)   · Cognitive disorder (294.9) (F09)   · Colon cancer screening (V76.51) (Z12.11)   · Combined form of age-related cataract, both eyes (366.19) (H25.813)   · Dementia (294.20) (F03.90)   · Depression (311) (F32.A)   · in remission with ongoing treatment   · Elevated low density  lipoprotein (LDL) cholesterol level (272.0) (E78.00)   · Encounter for screening for malignant neoplasm of colon (V76.51) (Z12.11)   · Encounter for screening mammogram for breast cancer (V76.12) (Z12.31)   · Essential hypertension (401.9) (I10)   · Excess ear wax (380.4) (H61.20)   · Excessive cerumen in both ear canals (380.4) (H61.23)   · Executive function deficit (799.55) (R41.844)   · Falls (E888.9) (W19.XXXA)   · Fatigue (780.79) (R53.83)   · 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)   · Hallux rigidus of right foot (735.2) (M20.21)   · Hallux valgus of right foot (735.0) (M20.11)   · Hammer toe (735.4) (M20.40)   · Hip pain (719.45) (M25.559)   · Hypercholesterolemia (272.0) (E78.00)   · Hyperopia (367.0) (H52.00)   · Impacted cerumen of left ear (380.4) (H61.22)   · LBBB (left bundle branch block) (426.3) (I44.7)   · Low vitamin D level (790.6) (R79.89)   · Mild cognitive impairment (331.83) (G31.84)   · non amnestic   · Multiple acquired skin tags (701.9) (L91.8)   · Myalgia (729.1) (M79.10)   · OA (osteoarthritis) of knee (715.36) (M17.9)   · OA (osteoarthritis) of knee (715.36) (M17.9)   · Osteoporosis (733.00) (M81.0)   · Overactive bladder (596.51) (N32.81)   · Pelvic fracture (808.8) (S32.9XXA)   · Pelvic fracture (808.8) (S32.9XXA)   · Poor balance (781.99) (R26.89)   · Primary osteoarthritis of left knee (715.16) (M17.12)   · Recurrent falls (V15.88) (R29.6)   · Refraction error (367.9) (H52.7)   · Screening for colorectal cancer (V76.51,V76.41) (Z12.11,Z12.12)   · Screening for osteoporosis (V82.81) (Z13.820)   · Screening mammogram, encounter for (V76.12) (Z12.31)   · Skin lesion, superficial (709.9) (L98.9)   · Traumatic brain injury (854.00) (S06.9XAA)   · Urinary incontinence (788.30) (R32)   · UTI symptoms (788.99) (R39.9)   · Vascular dementia (290.40) (F01.50)   · Visit for screening mammogram (V76.12) (Z12.31)   · Vitamin D insufficiency (268.9) (E55.9)   · Weakness of both legs (729.89)  (R29.898)   · Weight gain (783.1) (R63.5)    Past Medical History  Problems   · History of Combined form of age-related cataract, left eye (366.19) (H25.812)   · History of Combined form of age-related cataract, right eye (366.19) (H25.811)   · Depression (311) (F32.A)   · in remission with ongoing treatment    Surgical History  Problems   · History of Cholecystectomy   · History of Hysterectomy   · History of Tonsillectomy    Family History  Mother   · Family history of Colon Cancer (V16.0)   · Family history of Diabetes Mellitus (V18.0)   · Family history of Hypertension (V17.49)  Father   · Family history of Pick's disease   · confirmed via autopsy  Brother   · Family history of Acute Myocardial Infarction (V17.3)   · Family history of Diabetes Mellitus (V18.0)   · Family history of Stroke Syndrome (V17.1)    Social History  Problems   · Education Level: Graduate school   · Marital History - Single   · Never a smoker   · Never Drank Alcohol   · Never smoker   · Non-smoker (V49.89) (Z78.9)   · Occupation: Retired   · retired teacher    Allergies  Medication   · Aspirin TABS  Recorded By: Digna Osman; 10/11/2013 12:46:29 PM   · david  Recorded By: Katty Azevedo; 10/19/2016 9:24:13 AM   · Hops Flower POWD  Recorded By: Essence Villanueva; 5/8/2019 11:04:34 AM   · Onion Extract POWD  Recorded By: Katty Azevedo; 10/19/2016 9:24:13 AM  NonMedication   · Latex  Recorded By: Agnieszka Snyder; 2/7/2014 11:53:52 AM    Physical Exam by System:    Constitutional: Well developed, awake/alert/oriented x3, no distress on RA,  alert and cooperative  Eyes: PERRL, EOMI, clear sclera  ENMT: mucous membranes moist, no apparent injury, no lesions seen  Head/Neck: Neck supple, no apparent injury, thyroid without mass or tenderness,  No JVD, trachea midline, no bruits  Respiratory/Thorax: Patent airways, CTAB, normal breath sounds, no wheezes,  Cardiovascular: Regular, rate and rhythm, no murmurs, normal S 1and S  2  Gastrointestinal: Nondistended, soft, non-tender, no rebound tenderness or  guarding, no masses palpable, no organomegaly, +BS,  Musculoskeletal: ROM intact, no joint swelling,  Extremities: normal extremities, no ankle edema, contusions or wounds, no  clubbing  Neurological: alert and oriented x3, intact senses, motor, response and  reflexes, normal strength  Psychological: Appropriate mood and behavior  Skin: Warm and dry, no lesions, no rashes    Results:  CBC: 6/23/2023 11:40    \ Hgb / \ 14.1 /  WBC ---------------- Plt 14.1 H ---------------- 354   / Hct \ / 40.7 \    RBC: 4.46 MCV: 91    CMP: 6/23/2023 11:40  NA+  Cl-  BUN / 137  102  15 /  -------------------------------- Glucose  --------------------------- 115 H   K+  HCO3-  Creat \ 4.2  24  0.73 \    \ T Bili / \ 0.7 /  AST x ---- x ALT 25 x ---- x 45   / Alk P \ / 59 \  Calcium : 9.2 Anion Gap : 15 Albumin : 3.8 T Protein : 6.8    Assessment and Plan:  Comorbidities:   Comorbidity Other    Code Status:   Code Status Full Code    Assessment:  Ms. Ochoa is an 82 year old female with a PMH of HTN, HLD, and vascular  dementia who is presented to the ED with shortness of breath. On arrival to the  ED, she is mildly tachycardic, with SPO2 low normal, but otherwise her vital  signs are within normal limits. Blood work obtained was remarkable for  hyperglycemia, severe hypokalemia, and elevated troponin (40->37). An EKG was  without obvious signs of ischemia. She was given 500mg of Zithromax, 6mg IV  dexamethasone, 20 meq IV K+, 40 meq PO K+, 2gm Rocephin, and 1L LR bolus. A CTA  PE was negative for PE, however did demonstrated bilateral scattered  ground-glass, intersitial, and consolidative opacities consistent with  multifocal pneumonia. She is admitted to the medicine service as inpatient with  telemetry for further treatment and monitoring.    Acute hypoxic respiratory failure  Covid-19 pneumonia  - Completed Remdesivir  - CTA PE -ve for PE  -  Bilateral scattered GGO c/w multifocal infection on CTA PE  - Rocephin, Zithromax given in ED, continue  - Heparin SQ DVT chemoppx  -Benzonatate 200mg TID for cough  -Patient denies any SOB at this time,    HTN  BP is stable,  - Continue home medications,    Hypokalemia,  improved    Troponin elevation  - Downtrended  - No EKG changes  - Suspect demand mismatch due to hypoxia    physical deconditioning OT/PT    CBC BMP wedensday    Problem List Items Addressed This Visit       COVID-19    Acute respiratory failure with hypoxia (CMS/HCC) - Primary    Multifocal pneumonia    Mild vascular dementia without behavioral disturbance, psychotic disturbance, mood disturbance, or anxiety (CMS/HCC)    Benign essential hypertension    Hypokalemia               PLAN:Reviewed orders, medications, records, and pertinent labs/x-rays from   hospital. Monitor VS, BS, O2, etc as per protocol. See written orders. PT/OT   will evaluate and start appropriate rehabilitation program. Reviewed and signed   off orders, medications,Labs, x-rays, and current diagnoses. Reviewed and   updated CPR status and any changes in Advanced directives. Continue Rehab Will   see 1-2 times weekly for next 30 days then reassess. Will always see at   resident, family , or nursing request. Discharge Planning   Time   Time Spent With Patient: 45 minutes of which greater than 50 percent was spent   counseling and or coordinating care.    Electronically Signed Yunior Del Toro MD

## 2023-07-21 NOTE — PROGRESS NOTES
Acute visit    CC Covid 19 Ac resp failure    HPI  Ms. Ochoa is an 82 year old female with a PMH of HTN, HLD, and vascular  dementia who was presented to the ED with c/o SOB. On arrival to the ED, she  was mildly tachycardic, with SPO2 low normal, otherwise her vital signs were  within normal limits. Blood examination obtained was remarkable for  hyperglycemia, hypokalemia, and elevated troponin (40->37). An EKG was without  obvious signs of ischemia. She was given 500 mg of Zithromax, 6mg IV  dexamethasone, 20 meq IV K+, 40 meq PO K+, 2gm Rocephin, and 1L LR bolus. A CTA  PE was negative for PE, however did demonstrated bilateral scattered  ground-glass, intersitial, and consolidative opacities consistent with  multifocal pneumonia due to covid. Her home covid test was positive. She was  admitted to the medicine service as inpatient with telemetry for further  treatment and monitoring.  Today she is sitting in bed Uneventful night    ROS 14 systems reviewed negative except above    Active Problems  Problems   · Abnormal EEG (794.02) (R94.01)   · Abnormal EKG (794.31) (R94.31)   · Abnormal glucose (790.29) (R73.09)   · Acquired claw toe of right foot (735.5) (M20.5X1)   · Acute lower UTI (599.0) (N39.0)   · Arthralgia of pelvis or thigh (719.45) (M25.559)   · Astigmatism (367.20) (H52.209)   · Astigmatism of both eyes with presbyopia (367.20,367.4) (H52.203,H52.4)   · Ataxic gait (781.2) (R26.0)   · Ataxic gait (781.2) (R26.0)   · Benign essential hypertension (401.1) (I10)   · Cataract, nuclear sclerotic, left eye (366.16) (H25.12)   · Cataract, nuclear sclerotic, right eye (366.16) (H25.11)   · Cerebrovascular disease (437.9) (I67.9)   · Cognitive disorder (294.9) (F09)   · Colon cancer screening (V76.51) (Z12.11)   · Combined form of age-related cataract, both eyes (366.19) (H25.813)   · Dementia (294.20) (F03.90)   · Depression (311) (F32.A)   · in remission with ongoing treatment   · Elevated low density  lipoprotein (LDL) cholesterol level (272.0) (E78.00)   · Encounter for screening for malignant neoplasm of colon (V76.51) (Z12.11)   · Encounter for screening mammogram for breast cancer (V76.12) (Z12.31)   · Essential hypertension (401.9) (I10)   · Excess ear wax (380.4) (H61.20)   · Excessive cerumen in both ear canals (380.4) (H61.23)   · Executive function deficit (799.55) (R41.844)   · Falls (E888.9) (W19.XXXA)   · Fatigue (780.79) (R53.83)   · 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)   · Hallux rigidus of right foot (735.2) (M20.21)   · Hallux valgus of right foot (735.0) (M20.11)   · Hammer toe (735.4) (M20.40)   · Hip pain (719.45) (M25.559)   · Hypercholesterolemia (272.0) (E78.00)   · Hyperopia (367.0) (H52.00)   · Impacted cerumen of left ear (380.4) (H61.22)   · LBBB (left bundle branch block) (426.3) (I44.7)   · Low vitamin D level (790.6) (R79.89)   · Mild cognitive impairment (331.83) (G31.84)   · non amnestic   · Multiple acquired skin tags (701.9) (L91.8)   · Myalgia (729.1) (M79.10)   · OA (osteoarthritis) of knee (715.36) (M17.9)   · OA (osteoarthritis) of knee (715.36) (M17.9)   · Osteoporosis (733.00) (M81.0)   · Overactive bladder (596.51) (N32.81)   · Pelvic fracture (808.8) (S32.9XXA)   · Pelvic fracture (808.8) (S32.9XXA)   · Poor balance (781.99) (R26.89)   · Primary osteoarthritis of left knee (715.16) (M17.12)   · Recurrent falls (V15.88) (R29.6)   · Refraction error (367.9) (H52.7)   · Screening for colorectal cancer (V76.51,V76.41) (Z12.11,Z12.12)   · Screening for osteoporosis (V82.81) (Z13.820)   · Screening mammogram, encounter for (V76.12) (Z12.31)   · Skin lesion, superficial (709.9) (L98.9)   · Traumatic brain injury (854.00) (S06.9XAA)   · Urinary incontinence (788.30) (R32)   · UTI symptoms (788.99) (R39.9)   · Vascular dementia (290.40) (F01.50)   · Visit for screening mammogram (V76.12) (Z12.31)   · Vitamin D insufficiency (268.9) (E55.9)   · Weakness of both legs (729.89)  (R29.898)   · Weight gain (783.1) (R63.5)    Past Medical History  Problems   · History of Combined form of age-related cataract, left eye (366.19) (H25.812)   · History of Combined form of age-related cataract, right eye (366.19) (H25.811)   · Depression (311) (F32.A)   · in remission with ongoing treatment    Surgical History  Problems   · History of Cholecystectomy   · History of Hysterectomy   · History of Tonsillectomy    Family History  Mother   · Family history of Colon Cancer (V16.0)   · Family history of Diabetes Mellitus (V18.0)   · Family history of Hypertension (V17.49)  Father   · Family history of Pick's disease   · confirmed via autopsy  Brother   · Family history of Acute Myocardial Infarction (V17.3)   · Family history of Diabetes Mellitus (V18.0)   · Family history of Stroke Syndrome (V17.1)    Social History  Problems   · Education Level: Graduate school   · Marital History - Single   · Never a smoker   · Never Drank Alcohol   · Never smoker   · Non-smoker (V49.89) (Z78.9)   · Occupation: Retired   · retired teacher    Allergies  Medication   · Aspirin TABS  Recorded By: Digna Osman; 10/11/2013 12:46:29 PM   · david  Recorded By: Katty Azevedo; 10/19/2016 9:24:13 AM   · Hops Flower POWD  Recorded By: Essence Villanueva; 5/8/2019 11:04:34 AM   · Onion Extract POWD  Recorded By: Katty Azevedo; 10/19/2016 9:24:13 AM  NonMedication   · Latex  Recorded By: Agnieszka Snyder; 2/7/2014 11:53:52 AM    Physical Exam by System:    Constitutional: Well developed, awake/alert/oriented x3, no distress on RA,  alert and cooperative  Eyes: PERRL, EOMI, clear sclera  ENMT: mucous membranes moist, no apparent injury, no lesions seen  Head/Neck: Neck supple, no apparent injury, thyroid without mass or tenderness,  No JVD, trachea midline, no bruits  Respiratory/Thorax: Patent airways, CTAB, normal breath sounds, no wheezes,  Cardiovascular: Regular, rate and rhythm, no murmurs, normal S 1and S  2  Gastrointestinal: Nondistended, soft, non-tender, no rebound tenderness or  guarding, no masses palpable, no organomegaly, +BS,  Musculoskeletal: ROM intact, no joint swelling,  Extremities: normal extremities, no ankle edema, contusions or wounds, no  clubbing  Neurological: alert and oriented x3, intact senses, motor, response and  reflexes, normal strength  Psychological: Appropriate mood and behavior  Skin: Warm and dry, no lesions, no rashes    Results:  CBC: 6/23/2023 11:40    \ Hgb / \ 14.1 /  WBC ---------------- Plt 14.1 H ---------------- 354   / Hct \ / 40.7 \    RBC: 4.46 MCV: 91    CMP: 6/23/2023 11:40  NA+  Cl-  BUN / 137  102  15 /  -------------------------------- Glucose  --------------------------- 115 H   K+  HCO3-  Creat \ 4.2  24  0.73 \    \ T Bili / \ 0.7 /  AST x ---- x ALT 25 x ---- x 45   / Alk P \ / 59 \  Calcium : 9.2 Anion Gap : 15 Albumin : 3.8 T Protein : 6.8    Assessment and Plan:  Comorbidities:   Comorbidity Other    Code Status:   Code Status Full Code    Assessment:  Ms. Ochoa is an 82 year old female with a PMH of HTN, HLD, and vascular  dementia who is presented to the ED with shortness of breath. On arrival to the  ED, she is mildly tachycardic, with SPO2 low normal, but otherwise her vital  signs are within normal limits. Blood work obtained was remarkable for  hyperglycemia, severe hypokalemia, and elevated troponin (40->37). An EKG was  without obvious signs of ischemia. She was given 500mg of Zithromax, 6mg IV  dexamethasone, 20 meq IV K+, 40 meq PO K+, 2gm Rocephin, and 1L LR bolus. A CTA  PE was negative for PE, however did demonstrated bilateral scattered  ground-glass, intersitial, and consolidative opacities consistent with  multifocal pneumonia. She is admitted to the medicine service as inpatient with  telemetry for further treatment and monitoring.    Acute hypoxic respiratory failure  Covid-19 pneumonia  - Completed Remdesivir  - CTA PE -ve for PE  -  Bilateral scattered GGO c/w multifocal infection on CTA PE  - Rocephin, Zithromax given in ED, continue  - Heparin SQ DVT chemoppx  -Benzonatate 200mg TID for cough  -Patient denies any SOB at this time,    HTN  BP is stable,  - Continue home medications,    Hypokalemia,  improved    Troponin elevation  - Downtrended  - No EKG changes  - Suspect demand mismatch due to hypoxia    physical deconditioning OT/PT    CBC BMP wedensday    Problem List Items Addressed This Visit       COVID-19    Acute respiratory failure with hypoxia (CMS/HCC)    Multifocal pneumonia    Physical deconditioning    Mild vascular dementia without behavioral disturbance, psychotic disturbance, mood disturbance, or anxiety (CMS/HCC) - Primary    Benign essential hypertension    Hypokalemia                 PLAN:Reviewed orders, medications, records, and pertinent labs/x-rays from   hospital. Monitor VS, BS, O2, etc as per protocol. See written orders. PT/OT   will evaluate and start appropriate rehabilitation program. Reviewed and signed   off orders, medications,Labs, x-rays, and current diagnoses. Reviewed and   updated CPR status and any changes in Advanced directives. Continue Rehab Will   see 1-2 times weekly for next 30 days then reassess. Will always see at   resident, family , or nursing request. Discharge Planning   Time   Time Spent With Patient: 45 minutes of which greater than 50 percent was spent   counseling and or coordinating care.    Electronically Signed Yunior Del Toro MD

## 2023-07-21 NOTE — PROGRESS NOTES
Acute visit    CC Covid 19 Ac resp failure    HPI  Ms. Ochoa is an 82 year old female with a PMH of HTN, HLD, and vascular  dementia who was presented to the ED with c/o SOB. On arrival to the ED, she  was mildly tachycardic, with SPO2 low normal, otherwise her vital signs were  within normal limits. Blood examination obtained was remarkable for  hyperglycemia, hypokalemia, and elevated troponin (40->37). An EKG was without  obvious signs of ischemia. She was given 500 mg of Zithromax, 6mg IV  dexamethasone, 20 meq IV K+, 40 meq PO K+, 2gm Rocephin, and 1L LR bolus. A CTA  PE was negative for PE, however did demonstrated bilateral scattered  ground-glass, intersitial, and consolidative opacities consistent with  multifocal pneumonia due to covid. Her home covid test was positive. She was  admitted to the medicine service as inpatient with telemetry for further  treatment and monitoring.  Today she is sitting in bed Uneventful night    ROS 14 systems reviewed negative except above    Active Problems  Problems   · Abnormal EEG (794.02) (R94.01)   · Abnormal EKG (794.31) (R94.31)   · Abnormal glucose (790.29) (R73.09)   · Acquired claw toe of right foot (735.5) (M20.5X1)   · Acute lower UTI (599.0) (N39.0)   · Arthralgia of pelvis or thigh (719.45) (M25.559)   · Astigmatism (367.20) (H52.209)   · Astigmatism of both eyes with presbyopia (367.20,367.4) (H52.203,H52.4)   · Ataxic gait (781.2) (R26.0)   · Ataxic gait (781.2) (R26.0)   · Benign essential hypertension (401.1) (I10)   · Cataract, nuclear sclerotic, left eye (366.16) (H25.12)   · Cataract, nuclear sclerotic, right eye (366.16) (H25.11)   · Cerebrovascular disease (437.9) (I67.9)   · Cognitive disorder (294.9) (F09)   · Colon cancer screening (V76.51) (Z12.11)   · Combined form of age-related cataract, both eyes (366.19) (H25.813)   · Dementia (294.20) (F03.90)   · Depression (311) (F32.A)   · in remission with ongoing treatment   · Elevated low density  lipoprotein (LDL) cholesterol level (272.0) (E78.00)   · Encounter for screening for malignant neoplasm of colon (V76.51) (Z12.11)   · Encounter for screening mammogram for breast cancer (V76.12) (Z12.31)   · Essential hypertension (401.9) (I10)   · Excess ear wax (380.4) (H61.20)   · Excessive cerumen in both ear canals (380.4) (H61.23)   · Executive function deficit (799.55) (R41.844)   · Falls (E888.9) (W19.XXXA)   · Fatigue (780.79) (R53.83)   · 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)   · Hallux rigidus of right foot (735.2) (M20.21)   · Hallux valgus of right foot (735.0) (M20.11)   · Hammer toe (735.4) (M20.40)   · Hip pain (719.45) (M25.559)   · Hypercholesterolemia (272.0) (E78.00)   · Hyperopia (367.0) (H52.00)   · Impacted cerumen of left ear (380.4) (H61.22)   · LBBB (left bundle branch block) (426.3) (I44.7)   · Low vitamin D level (790.6) (R79.89)   · Mild cognitive impairment (331.83) (G31.84)   · non amnestic   · Multiple acquired skin tags (701.9) (L91.8)   · Myalgia (729.1) (M79.10)   · OA (osteoarthritis) of knee (715.36) (M17.9)   · OA (osteoarthritis) of knee (715.36) (M17.9)   · Osteoporosis (733.00) (M81.0)   · Overactive bladder (596.51) (N32.81)   · Pelvic fracture (808.8) (S32.9XXA)   · Pelvic fracture (808.8) (S32.9XXA)   · Poor balance (781.99) (R26.89)   · Primary osteoarthritis of left knee (715.16) (M17.12)   · Recurrent falls (V15.88) (R29.6)   · Refraction error (367.9) (H52.7)   · Screening for colorectal cancer (V76.51,V76.41) (Z12.11,Z12.12)   · Screening for osteoporosis (V82.81) (Z13.820)   · Screening mammogram, encounter for (V76.12) (Z12.31)   · Skin lesion, superficial (709.9) (L98.9)   · Traumatic brain injury (854.00) (S06.9XAA)   · Urinary incontinence (788.30) (R32)   · UTI symptoms (788.99) (R39.9)   · Vascular dementia (290.40) (F01.50)   · Visit for screening mammogram (V76.12) (Z12.31)   · Vitamin D insufficiency (268.9) (E55.9)   · Weakness of both legs (729.89)  (R29.898)   · Weight gain (783.1) (R63.5)    Past Medical History  Problems   · History of Combined form of age-related cataract, left eye (366.19) (H25.812)   · History of Combined form of age-related cataract, right eye (366.19) (H25.811)   · Depression (311) (F32.A)   · in remission with ongoing treatment    Surgical History  Problems   · History of Cholecystectomy   · History of Hysterectomy   · History of Tonsillectomy    Family History  Mother   · Family history of Colon Cancer (V16.0)   · Family history of Diabetes Mellitus (V18.0)   · Family history of Hypertension (V17.49)  Father   · Family history of Pick's disease   · confirmed via autopsy  Brother   · Family history of Acute Myocardial Infarction (V17.3)   · Family history of Diabetes Mellitus (V18.0)   · Family history of Stroke Syndrome (V17.1)    Social History  Problems   · Education Level: Graduate school   · Marital History - Single   · Never a smoker   · Never Drank Alcohol   · Never smoker   · Non-smoker (V49.89) (Z78.9)   · Occupation: Retired   · retired teacher    Allergies  Medication   · Aspirin TABS  Recorded By: Digna Osman; 10/11/2013 12:46:29 PM   · david  Recorded By: Katty Azevedo; 10/19/2016 9:24:13 AM   · Hops Flower POWD  Recorded By: Essence Villanueva; 5/8/2019 11:04:34 AM   · Onion Extract POWD  Recorded By: Katty Azevedo; 10/19/2016 9:24:13 AM  NonMedication   · Latex  Recorded By: Agnieszka Snyder; 2/7/2014 11:53:52 AM    Physical Exam by System:    Constitutional: Well developed, awake/alert/oriented x3, no distress on RA,  alert and cooperative  Eyes: PERRL, EOMI, clear sclera  ENMT: mucous membranes moist, no apparent injury, no lesions seen  Head/Neck: Neck supple, no apparent injury, thyroid without mass or tenderness,  No JVD, trachea midline, no bruits  Respiratory/Thorax: Patent airways, CTAB, normal breath sounds, no wheezes,  Cardiovascular: Regular, rate and rhythm, no murmurs, normal S 1and S  2  Gastrointestinal: Nondistended, soft, non-tender, no rebound tenderness or  guarding, no masses palpable, no organomegaly, +BS,  Musculoskeletal: ROM intact, no joint swelling,  Extremities: normal extremities, no ankle edema, contusions or wounds, no  clubbing  Neurological: alert and oriented x3, intact senses, motor, response and  reflexes, normal strength  Psychological: Appropriate mood and behavior  Skin: Warm and dry, no lesions, no rashes    Results:  CBC: 6/23/2023 11:40    \ Hgb / \ 14.1 /  WBC ---------------- Plt 14.1 H ---------------- 354   / Hct \ / 40.7 \    RBC: 4.46 MCV: 91    CMP: 6/23/2023 11:40  NA+  Cl-  BUN / 137  102  15 /  -------------------------------- Glucose  --------------------------- 115 H   K+  HCO3-  Creat \ 4.2  24  0.73 \    \ T Bili / \ 0.7 /  AST x ---- x ALT 25 x ---- x 45   / Alk P \ / 59 \  Calcium : 9.2 Anion Gap : 15 Albumin : 3.8 T Protein : 6.8    Assessment and Plan:  Comorbidities:   Comorbidity Other    Code Status:   Code Status Full Code    Assessment:  Ms. Ochoa is an 82 year old female with a PMH of HTN, HLD, and vascular  dementia who is presented to the ED with shortness of breath. On arrival to the  ED, she is mildly tachycardic, with SPO2 low normal, but otherwise her vital  signs are within normal limits. Blood work obtained was remarkable for  hyperglycemia, severe hypokalemia, and elevated troponin (40->37). An EKG was  without obvious signs of ischemia. She was given 500mg of Zithromax, 6mg IV  dexamethasone, 20 meq IV K+, 40 meq PO K+, 2gm Rocephin, and 1L LR bolus. A CTA  PE was negative for PE, however did demonstrated bilateral scattered  ground-glass, intersitial, and consolidative opacities consistent with  multifocal pneumonia. She is admitted to the medicine service as inpatient with  telemetry for further treatment and monitoring.    Acute hypoxic respiratory failure  Covid-19 pneumonia  - Completed Remdesivir  - CTA PE -ve for PE  -  Bilateral scattered GGO c/w multifocal infection on CTA PE  - Rocephin, Zithromax given in ED, continue  - Heparin SQ DVT chemoppx  -Benzonatate 200mg TID for cough  -Patient denies any SOB at this time,    HTN  BP is stable,  - Continue home medications,    Hypokalemia,  improved    Troponin elevation  - Downtrended  - No EKG changes  - Suspect demand mismatch due to hypoxia    physical deconditioning OT/PT    CBC BMP wedensday    Problem List Items Addressed This Visit       COVID-19    Acute respiratory failure with hypoxia (CMS/HCC) - Primary    Multifocal pneumonia    Physical deconditioning    Mild vascular dementia without behavioral disturbance, psychotic disturbance, mood disturbance, or anxiety (CMS/HCC)    Benign essential hypertension    Hypokalemia           PLAN:Reviewed orders, medications, records, and pertinent labs/x-rays from   hospital. Monitor VS, BS, O2, etc as per protocol. See written orders. PT/OT   will evaluate and start appropriate rehabilitation program. Reviewed and signed   off orders, medications,Labs, x-rays, and current diagnoses. Reviewed and   updated CPR status and any changes in Advanced directives. Continue Rehab Will   see 1-2 times weekly for next 30 days then reassess. Will always see at   resident, family , or nursing request. Discharge Planning   Time   Time Spent With Patient: 45 minutes of which greater than 50 percent was spent   counseling and or coordinating care.    Electronically Signed Yunior Del Toro MD

## 2023-07-21 NOTE — PROGRESS NOTES
Discharge visit    CC Covid 19 Ac resp failure    HPI  Ms. Ochoa is an 82 year old female with a PMH of HTN, HLD, and vascular  dementia who was presented to the ED with c/o SOB. On arrival to the ED, she  was mildly tachycardic, with SPO2 low normal, otherwise her vital signs were  within normal limits. Blood examination obtained was remarkable for  hyperglycemia, hypokalemia, and elevated troponin (40->37). An EKG was without  obvious signs of ischemia. She was given 500 mg of Zithromax, 6mg IV  dexamethasone, 20 meq IV K+, 40 meq PO K+, 2gm Rocephin, and 1L LR bolus. A CTA  PE was negative for PE, however did demonstrated bilateral scattered  ground-glass, intersitial, and consolidative opacities consistent with  multifocal pneumonia due to covid. Her home covid test was positive. She was  admitted to the medicine service as inpatient with telemetry for further  treatment and monitoring.  Today she is sitting in bed Uneventful night  To be dc home in AM    ROS 14 systems reviewed negative except above    Active Problems  Problems   · Abnormal EEG (794.02) (R94.01)   · Abnormal EKG (794.31) (R94.31)   · Abnormal glucose (790.29) (R73.09)   · Acquired claw toe of right foot (735.5) (M20.5X1)   · Acute lower UTI (599.0) (N39.0)   · Arthralgia of pelvis or thigh (719.45) (M25.559)   · Astigmatism (367.20) (H52.209)   · Astigmatism of both eyes with presbyopia (367.20,367.4) (H52.203,H52.4)   · Ataxic gait (781.2) (R26.0)   · Ataxic gait (781.2) (R26.0)   · Benign essential hypertension (401.1) (I10)   · Cataract, nuclear sclerotic, left eye (366.16) (H25.12)   · Cataract, nuclear sclerotic, right eye (366.16) (H25.11)   · Cerebrovascular disease (437.9) (I67.9)   · Cognitive disorder (294.9) (F09)   · Colon cancer screening (V76.51) (Z12.11)   · Combined form of age-related cataract, both eyes (366.19) (H25.813)   · Dementia (294.20) (F03.90)   · Depression (311) (F32.A)   · in remission with ongoing treatment   ·  Elevated low density lipoprotein (LDL) cholesterol level (272.0) (E78.00)   · Encounter for screening for malignant neoplasm of colon (V76.51) (Z12.11)   · Encounter for screening mammogram for breast cancer (V76.12) (Z12.31)   · Essential hypertension (401.9) (I10)   · Excess ear wax (380.4) (H61.20)   · Excessive cerumen in both ear canals (380.4) (H61.23)   · Executive function deficit (799.55) (R41.844)   · Falls (E888.9) (W19.XXXA)   · Fatigue (780.79) (R53.83)   · 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)   · Hallux rigidus of right foot (735.2) (M20.21)   · Hallux valgus of right foot (735.0) (M20.11)   · Hammer toe (735.4) (M20.40)   · Hip pain (719.45) (M25.559)   · Hypercholesterolemia (272.0) (E78.00)   · Hyperopia (367.0) (H52.00)   · Impacted cerumen of left ear (380.4) (H61.22)   · LBBB (left bundle branch block) (426.3) (I44.7)   · Low vitamin D level (790.6) (R79.89)   · Mild cognitive impairment (331.83) (G31.84)   · non amnestic   · Multiple acquired skin tags (701.9) (L91.8)   · Myalgia (729.1) (M79.10)   · OA (osteoarthritis) of knee (715.36) (M17.9)   · OA (osteoarthritis) of knee (715.36) (M17.9)   · Osteoporosis (733.00) (M81.0)   · Overactive bladder (596.51) (N32.81)   · Pelvic fracture (808.8) (S32.9XXA)   · Pelvic fracture (808.8) (S32.9XXA)   · Poor balance (781.99) (R26.89)   · Primary osteoarthritis of left knee (715.16) (M17.12)   · Recurrent falls (V15.88) (R29.6)   · Refraction error (367.9) (H52.7)   · Screening for colorectal cancer (V76.51,V76.41) (Z12.11,Z12.12)   · Screening for osteoporosis (V82.81) (Z13.820)   · Screening mammogram, encounter for (V76.12) (Z12.31)   · Skin lesion, superficial (709.9) (L98.9)   · Traumatic brain injury (854.00) (S06.9XAA)   · Urinary incontinence (788.30) (R32)   · UTI symptoms (788.99) (R39.9)   · Vascular dementia (290.40) (F01.50)   · Visit for screening mammogram (V76.12) (Z12.31)   · Vitamin D insufficiency (268.9) (E55.9)   · Weakness of both  legs (729.89) (R29.898)   · Weight gain (783.1) (R63.5)    Past Medical History  Problems   · History of Combined form of age-related cataract, left eye (366.19) (H25.812)   · History of Combined form of age-related cataract, right eye (366.19) (H25.811)   · Depression (311) (F32.A)   · in remission with ongoing treatment    Surgical History  Problems   · History of Cholecystectomy   · History of Hysterectomy   · History of Tonsillectomy    Family History  Mother   · Family history of Colon Cancer (V16.0)   · Family history of Diabetes Mellitus (V18.0)   · Family history of Hypertension (V17.49)  Father   · Family history of Pick's disease   · confirmed via autopsy  Brother   · Family history of Acute Myocardial Infarction (V17.3)   · Family history of Diabetes Mellitus (V18.0)   · Family history of Stroke Syndrome (V17.1)    Social History  Problems   · Education Level: Graduate school   · Marital History - Single   · Never a smoker   · Never Drank Alcohol   · Never smoker   · Non-smoker (V49.89) (Z78.9)   · Occupation: Retired   · retired teacher    Allergies  Medication   · Aspirin TABS  Recorded By: Digna Osman; 10/11/2013 12:46:29 PM   · david  Recorded By: Katty Azevedo; 10/19/2016 9:24:13 AM   · Hops Flower POWD  Recorded By: Essence Villanueva; 5/8/2019 11:04:34 AM   · Onion Extract POWD  Recorded By: Katty Azevedo; 10/19/2016 9:24:13 AM  NonMedication   · Latex  Recorded By: Agnieszka Snyder; 2/7/2014 11:53:52 AM    Physical Exam by System:    Constitutional: Well developed, awake/alert/oriented x3, no distress on RA,  alert and cooperative  Eyes: PERRL, EOMI, clear sclera  ENMT: mucous membranes moist, no apparent injury, no lesions seen  Head/Neck: Neck supple, no apparent injury, thyroid without mass or tenderness,  No JVD, trachea midline, no bruits  Respiratory/Thorax: Patent airways, CTAB, normal breath sounds, no wheezes,  Cardiovascular: Regular, rate and rhythm, no murmurs, normal S 1and S  2  Gastrointestinal: Nondistended, soft, non-tender, no rebound tenderness or  guarding, no masses palpable, no organomegaly, +BS,  Musculoskeletal: ROM intact, no joint swelling,  Extremities: normal extremities, no ankle edema, contusions or wounds, no  clubbing  Neurological: alert and oriented x3, intact senses, motor, response and  reflexes, normal strength  Psychological: Appropriate mood and behavior  Skin: Warm and dry, no lesions, no rashes    Results:  CBC: 6/23/2023 11:40    \ Hgb / \ 14.1 /  WBC ---------------- Plt 14.1 H ---------------- 354   / Hct \ / 40.7 \    RBC: 4.46 MCV: 91    CMP: 6/23/2023 11:40  NA+  Cl-  BUN / 137  102  15 /  -------------------------------- Glucose  --------------------------- 115 H   K+  HCO3-  Creat \ 4.2  24  0.73 \    \ T Bili / \ 0.7 /  AST x ---- x ALT 25 x ---- x 45   / Alk P \ / 59 \  Calcium : 9.2 Anion Gap : 15 Albumin : 3.8 T Protein : 6.8    Assessment and Plan:  Comorbidities:   Comorbidity Other    Code Status:   Code Status Full Code    Assessment:  Ms. Ochoa is an 82 year old female with a PMH of HTN, HLD, and vascular  dementia who is presented to the ED with shortness of breath. On arrival to the  ED, she is mildly tachycardic, with SPO2 low normal, but otherwise her vital  signs are within normal limits. Blood work obtained was remarkable for  hyperglycemia, severe hypokalemia, and elevated troponin (40->37). An EKG was  without obvious signs of ischemia. She was given 500mg of Zithromax, 6mg IV  dexamethasone, 20 meq IV K+, 40 meq PO K+, 2gm Rocephin, and 1L LR bolus. A CTA  PE was negative for PE, however did demonstrated bilateral scattered  ground-glass, intersitial, and consolidative opacities consistent with  multifocal pneumonia. She is admitted to the medicine service as inpatient with  telemetry for further treatment and monitoring.    Acute hypoxic respiratory failure  Covid-19 pneumonia  - Completed Remdesivir  - CTA PE -ve for PE  -  Bilateral scattered GGO c/w multifocal infection on CTA PE  - Rocephin, Zithromax given in ED, continue  - Heparin SQ DVT chemoppx  -Benzonatate 200mg TID for cough  -Patient denies any SOB at this time,    HTN  BP is stable,  - Continue home medications,    Hypokalemia,  improved    Troponin elevation  - Downtrended  - No EKG changes  - Suspect demand mismatch due to hypoxia    physical deconditioning OT/PT    CBC BMP wedensday    Problem List Items Addressed This Visit       Mild vascular dementia without behavioral disturbance, psychotic disturbance, mood disturbance, or anxiety (CMS/HCC) - Primary    Relevant Medications    sertraline (Zoloft) 100 mg tablet    Benign essential hypertension    Relevant Medications    amLODIPine (Norvasc) 2.5 mg tablet    atorvastatin (Lipitor) 40 mg tablet     Other Visit Diagnoses       OAB (overactive bladder)        Relevant Medications    oxybutynin (Ditropan) 5 mg tablet    Gastroesophageal reflux disease without esophagitis        Relevant Medications    pantoprazole (ProtoNix) 40 mg EC tablet    alendronate (Fosamax) 70 mg tablet             Assessment: first home care visit: 1-2 days after discharge.   Status upon Discharge: Stable for discharge and improved from admission.   Discharge to home in stable and improved condition.  Completed rehab program and was discharged. Had functional improvement when compared to admission and is felt medically and physically stable to be discharged from our rehab facility. Please see individual recommendations from the respective therapy disciplines for activity and/or restrictions. Medically stable for discharge at this time.    Instructions given, Rx's escribed, meds reviewed, appropriate follow-up, and due to diagnosis listed in discharge patient is considered home bound. Referred for C, as arranged per , for RN, PT, and OT, if indicated. See discharge instructions. Family aware and notified of DC information.  Recommend contacting PCP for appt in 7-10 days for follow-up on conditions treated in hospital and in rehab facility.   Referred for HHC as arranged per .   Face to Face Certification: face to face encounter completed   Medical Necessity for Home care: Short term nursing follow up is needed to monitor for signs and symptoms of   decompensations/adverse events. Rehab services to improve function and establish home exercise program.   Skilled Disciplines ordered: RN/PT/OT/SP as indicated.   Home care skilled service: assessment, rehab (PT/OT/SP eval and treat).   Homebound status: homebound.   Homebound status due to: diagnosis listed above.

## 2023-07-31 DIAGNOSIS — K21.9 GASTROESOPHAGEAL REFLUX DISEASE WITHOUT ESOPHAGITIS: ICD-10-CM

## 2023-08-01 ENCOUNTER — OFFICE VISIT (OUTPATIENT)
Dept: PRIMARY CARE | Facility: CLINIC | Age: 83
End: 2023-08-01
Payer: MEDICARE

## 2023-08-01 VITALS
OXYGEN SATURATION: 94 % | RESPIRATION RATE: 12 BRPM | DIASTOLIC BLOOD PRESSURE: 71 MMHG | SYSTOLIC BLOOD PRESSURE: 121 MMHG | HEART RATE: 80 BPM

## 2023-08-01 DIAGNOSIS — R53.81 PHYSICAL DECONDITIONING: ICD-10-CM

## 2023-08-01 DIAGNOSIS — I10 BENIGN ESSENTIAL HYPERTENSION: Primary | ICD-10-CM

## 2023-08-01 DIAGNOSIS — Z12.31 VISIT FOR SCREENING MAMMOGRAM: ICD-10-CM

## 2023-08-01 DIAGNOSIS — Z00.00 HEALTH CARE MAINTENANCE: ICD-10-CM

## 2023-08-01 PROCEDURE — 3078F DIAST BP <80 MM HG: CPT | Performed by: INTERNAL MEDICINE

## 2023-08-01 PROCEDURE — 1159F MED LIST DOCD IN RCRD: CPT | Performed by: INTERNAL MEDICINE

## 2023-08-01 PROCEDURE — 1036F TOBACCO NON-USER: CPT | Performed by: INTERNAL MEDICINE

## 2023-08-01 PROCEDURE — 99213 OFFICE O/P EST LOW 20 MIN: CPT | Performed by: INTERNAL MEDICINE

## 2023-08-01 PROCEDURE — 1126F AMNT PAIN NOTED NONE PRSNT: CPT | Performed by: INTERNAL MEDICINE

## 2023-08-01 PROCEDURE — 3074F SYST BP LT 130 MM HG: CPT | Performed by: INTERNAL MEDICINE

## 2023-08-01 PROCEDURE — 1160F RVW MEDS BY RX/DR IN RCRD: CPT | Performed by: INTERNAL MEDICINE

## 2023-08-01 RX ORDER — ALENDRONATE SODIUM 70 MG/1
70 TABLET ORAL
Qty: 4 TABLET | Refills: 0 | OUTPATIENT
Start: 2023-08-01

## 2023-08-01 NOTE — PROGRESS NOTES
Subjective   Patient ID: Noy Ochoa is a 82 y.o. female who presents for No chief complaint on file..    Dragon issue  HPI follow up pneumonia/covid previously  No cp, sob  No se with medication  D/w patient/family  safety and evaluation  Review of Systems    Objective   There were no vitals taken for this visit.    Physical Exam vs noted alert and oriented with assist ncat  No jvd  Chest ctap no crackles  Cv rrr s1s2  Ext no cce nl distal pulses non pitting edema unchanged    Assessment/Plan impression status post pneumonia htn urine incontinence/physical deconditioning  Plan referral for Dr. Nikolay Milner referral for Dr. Scott from psychiatry (to make an appointment (check)  questions about incontinence medication (check)  needs new mammogram (check) had bone density  Needs nutritionist (check)  Home PT (check)  Needs  evaluation (OT) check  Continue with medication for blood pressure while watching diet simply  Recheck 1-3 months

## 2023-09-13 ENCOUNTER — TELEPHONE (OUTPATIENT)
Dept: PRIMARY CARE | Facility: CLINIC | Age: 83
End: 2023-09-13
Payer: MEDICARE

## 2023-09-14 DIAGNOSIS — N39.46 MIXED STRESS AND URGE URINARY INCONTINENCE: Primary | ICD-10-CM

## 2023-09-28 LAB — URINE CULTURE: ABNORMAL

## 2023-10-18 PROBLEM — R29.6 RECURRENT FALLS: Status: ACTIVE | Noted: 2023-10-18

## 2023-10-18 PROBLEM — R79.89 LOW VITAMIN D LEVEL: Status: ACTIVE | Noted: 2023-10-18

## 2023-10-18 PROBLEM — R09.02 HYPOXEMIA: Status: ACTIVE | Noted: 2023-06-24

## 2023-10-18 PROBLEM — R26.9 ABNORMALITY OF GAIT: Status: ACTIVE | Noted: 2017-04-19

## 2023-10-18 PROBLEM — M81.0 OSTEOPOROSIS: Status: ACTIVE | Noted: 2023-10-18

## 2023-10-18 PROBLEM — F32.A DEPRESSION: Status: ACTIVE | Noted: 2023-10-18

## 2023-10-18 PROBLEM — R26.89 POOR BALANCE: Status: ACTIVE | Noted: 2022-07-13

## 2023-10-18 PROBLEM — F09 COGNITIVE DISORDER: Status: ACTIVE | Noted: 2023-10-18

## 2023-10-18 PROBLEM — R94.31 ABNORMAL EKG: Status: ACTIVE | Noted: 2023-10-18

## 2023-10-18 PROBLEM — I67.9 CEREBROVASCULAR DISEASE: Status: ACTIVE | Noted: 2023-10-18

## 2023-10-18 PROBLEM — E78.00 ELEVATED LOW DENSITY LIPOPROTEIN (LDL) CHOLESTEROL LEVEL: Status: ACTIVE | Noted: 2023-10-18

## 2023-10-18 PROBLEM — F07.0 FRONTAL LOBE SYNDROME: Status: ACTIVE | Noted: 2023-10-18

## 2023-10-18 PROBLEM — G82.20 PARAPARESIS (MULTI): Status: ACTIVE | Noted: 2021-07-13

## 2023-10-18 PROBLEM — R94.01 ABNORMAL EEG: Status: ACTIVE | Noted: 2023-10-18

## 2023-10-18 PROBLEM — H25.10 NUCLEAR SCLEROTIC CATARACT: Status: ACTIVE | Noted: 2023-10-18

## 2023-10-18 PROBLEM — M79.10 MYALGIA: Status: ACTIVE | Noted: 2023-10-18

## 2023-10-18 PROBLEM — I44.7 LBBB (LEFT BUNDLE BRANCH BLOCK): Status: ACTIVE | Noted: 2023-10-18

## 2023-10-18 PROBLEM — S06.9XAA TRAUMATIC BRAIN INJURY (MULTI): Status: ACTIVE | Noted: 2023-10-18

## 2023-10-18 PROBLEM — E78.00 HYPERCHOLESTEROLEMIA: Status: ACTIVE | Noted: 2023-10-18

## 2023-10-18 PROBLEM — H52.203 ASTIGMATISM OF BOTH EYES WITH PRESBYOPIA: Status: ACTIVE | Noted: 2023-10-18

## 2023-10-18 PROBLEM — H61.23 BILATERAL IMPACTED CERUMEN: Status: ACTIVE | Noted: 2022-04-26

## 2023-10-18 PROBLEM — H25.813 COMBINED FORM OF AGE-RELATED CATARACT, BOTH EYES: Status: ACTIVE | Noted: 2023-10-18

## 2023-10-18 PROBLEM — R73.09 ABNORMAL GLUCOSE: Status: ACTIVE | Noted: 2023-10-18

## 2023-10-18 PROBLEM — R53.83 FATIGUE: Status: ACTIVE | Noted: 2023-10-18

## 2023-10-18 PROBLEM — M17.12 PRIMARY OSTEOARTHRITIS OF LEFT KNEE: Status: ACTIVE | Noted: 2023-10-18

## 2023-10-18 PROBLEM — E55.9 VITAMIN D INSUFFICIENCY: Status: ACTIVE | Noted: 2023-10-18

## 2023-10-18 PROBLEM — F33.9 MAJOR DEPRESSIVE DISORDER, RECURRENT, UNSPECIFIED (CMS-HCC): Status: ACTIVE | Noted: 2023-06-24

## 2023-10-18 PROBLEM — M20.5X1 ACQUIRED CLAW TOE OF RIGHT FOOT: Status: ACTIVE | Noted: 2023-10-18

## 2023-10-18 PROBLEM — R41.841 COGNITIVE COMMUNICATION DEFICIT: Status: ACTIVE | Noted: 2023-06-28

## 2023-10-18 PROBLEM — H52.209 ASTIGMATISM: Status: ACTIVE | Noted: 2023-10-18

## 2023-10-18 PROBLEM — H52.4 ASTIGMATISM OF BOTH EYES WITH PRESBYOPIA: Status: ACTIVE | Noted: 2023-10-18

## 2023-10-18 RX ORDER — NITROFURANTOIN 25; 75 MG/1; MG/1
1 CAPSULE ORAL 2 TIMES DAILY
COMMUNITY
End: 2023-10-23 | Stop reason: ALTCHOICE

## 2023-10-18 RX ORDER — CALCIUM CARBONATE 500(1250)
1 TABLET ORAL DAILY
COMMUNITY
Start: 2018-11-12

## 2023-10-18 RX ORDER — MULTIVIT-MIN/IRON/FOLIC ACID/K 18-600-40
500 CAPSULE ORAL
COMMUNITY
Start: 2013-10-23

## 2023-10-18 RX ORDER — CHOLECALCIFEROL (VITAMIN D3) 125 MCG
125 CAPSULE ORAL
COMMUNITY
Start: 2017-11-10

## 2023-10-18 RX ORDER — MIRABEGRON 25 MG/1
25 TABLET, FILM COATED, EXTENDED RELEASE ORAL DAILY
COMMUNITY
End: 2023-10-19 | Stop reason: SINTOL

## 2023-10-19 ENCOUNTER — OFFICE VISIT (OUTPATIENT)
Dept: UROLOGY | Facility: CLINIC | Age: 83
End: 2023-10-19
Payer: MEDICARE

## 2023-10-19 DIAGNOSIS — N32.81 OAB (OVERACTIVE BLADDER): ICD-10-CM

## 2023-10-19 DIAGNOSIS — R31.9 URINARY TRACT INFECTION WITH HEMATURIA, SITE UNSPECIFIED: Primary | ICD-10-CM

## 2023-10-19 DIAGNOSIS — N39.0 URINARY TRACT INFECTION WITH HEMATURIA, SITE UNSPECIFIED: Primary | ICD-10-CM

## 2023-10-19 LAB
POC APPEARANCE, URINE: CLEAR
POC BILIRUBIN, URINE: NEGATIVE
POC BLOOD, URINE: NEGATIVE
POC COLOR, URINE: YELLOW
POC GLUCOSE, URINE: NEGATIVE MG/DL
POC KETONES, URINE: NEGATIVE MG/DL
POC LEUKOCYTES, URINE: ABNORMAL
POC NITRITE,URINE: POSITIVE
POC PH, URINE: 7.5 PH
POC PROTEIN, URINE: NEGATIVE MG/DL
POC SPECIFIC GRAVITY, URINE: 1.02
POC UROBILINOGEN, URINE: 0.2 EU/DL

## 2023-10-19 PROCEDURE — 81001 URINALYSIS AUTO W/SCOPE: CPT

## 2023-10-19 PROCEDURE — 1159F MED LIST DOCD IN RCRD: CPT | Performed by: NURSE PRACTITIONER

## 2023-10-19 PROCEDURE — 1036F TOBACCO NON-USER: CPT | Performed by: NURSE PRACTITIONER

## 2023-10-19 PROCEDURE — 87186 SC STD MICRODIL/AGAR DIL: CPT

## 2023-10-19 PROCEDURE — 87086 URINE CULTURE/COLONY COUNT: CPT

## 2023-10-19 PROCEDURE — 81003 URINALYSIS AUTO W/O SCOPE: CPT | Performed by: NURSE PRACTITIONER

## 2023-10-19 PROCEDURE — 1157F ADVNC CARE PLAN IN RCRD: CPT | Performed by: NURSE PRACTITIONER

## 2023-10-19 PROCEDURE — 99214 OFFICE O/P EST MOD 30 MIN: CPT | Performed by: NURSE PRACTITIONER

## 2023-10-19 PROCEDURE — 1160F RVW MEDS BY RX/DR IN RCRD: CPT | Performed by: NURSE PRACTITIONER

## 2023-10-19 RX ORDER — TROSPIUM CHLORIDE 20 MG/1
20 TABLET, FILM COATED ORAL DAILY
Qty: 30 TABLET | Refills: 2 | Status: SHIPPED | OUTPATIENT
Start: 2023-10-19 | End: 2024-02-28 | Stop reason: WASHOUT

## 2023-10-19 NOTE — PROGRESS NOTES
UROLOGIC FOLLOW-UP VISIT     PROBLEM LIST:  1. Urinary tract infection with hematuria, site unspecified  POCT UA Automated manually resulted    Urine culture    Microscopic Only, Urine    Urine culture      2. OAB (overactive bladder)  Follow Up In Urology    DISCONTINUED: trospium (Sanctura) 20 mg tablet           HISTORY OF PRESENT ILLNESS:   Noy Ochoa is a 83 y.o. with urinary incontinence, frequency, PSH hysterectomy  Started on topical estrogen, trial of mirabegron, referred to pelvic floor PT at last visit 9/26/23    INTERVAL HISTORY:  Returns today for [facility]  Seen accompanied by son  Stopped mirabegron after multiple episodes of HTN  Doesn't think it was helping anyway  Feels isolated by incontinence  NTF especially distressing  Open to any options that might provide relief  Doing PT for balance issues, helping    PAST MEDICAL HISTORY:  Past Medical History:   Diagnosis Date    Combined forms of age-related cataract, left eye 09/06/2022    Combined form of age-related cataract, left eye    Combined forms of age-related cataract, right eye 09/06/2022    Combined form of age-related cataract, right eye    Depression, unspecified 02/23/2022    Depression       PAST SURGICAL HISTORY:  Past Surgical History:   Procedure Laterality Date    CHOLECYSTECTOMY  10/11/2013    Cholecystectomy    HYSTERECTOMY  10/11/2013    Hysterectomy    TONSILLECTOMY  10/11/2013    Tonsillectomy        ALLERGIES:   Allergies   Allergen Reactions    Aspirin Swelling    Food Extracts Swelling     Cod fish, voilet, onion and hopps    Violet Unknown    Latex Unknown    Lidocaine Swelling        MEDICATIONS:   Current Outpatient Medications on File Prior to Visit   Medication Sig Dispense Refill    alendronate (Fosamax) 70 mg tablet Take 1 tablet (70 mg) by mouth every 7 days. 4 tablet 0    amLODIPine (Norvasc) 2.5 mg tablet Take 1 tablet (2.5 mg) by mouth once daily. 30 tablet 0    ascorbic acid, vitamin C, 500 mg capsule Take 500 mg  by mouth.      atorvastatin (Lipitor) 40 mg tablet Take 1 tablet (40 mg) by mouth once daily. 30 tablet 0    calcium carbonate (Oscal) 500 mg calcium (1,250 mg) tablet Take 1 tablet (1,250 mg) by mouth once daily.      cholecalciferol (Vitamin D-3) 125 MCG (5000 UT) capsule Take 1 capsule (5,000 Units) by mouth.      mirabegron (Myrbetriq) 25 mg tablet extended release 24 hr 24 hr tablet Take 1 tablet (25 mg) by mouth once daily.      multivitamin capsule Take by mouth.      nitrofurantoin, macrocrystal-monohydrate, (Macrobid) 100 mg capsule Take 1 capsule (100 mg) by mouth 2 times a day.      oxybutynin (Ditropan) 5 mg tablet Take 1 tablet (5 mg) by mouth once daily. 30 tablet 0    pantoprazole (ProtoNix) 40 mg EC tablet Take 1 tablet (40 mg) by mouth once daily. Do not crush, chew, or split. 30 tablet 0    sertraline (Zoloft) 100 mg tablet Take 2 tablets (200 mg) by mouth once daily. 30 tablet 0     No current facility-administered medications on file prior to visit.        SOCIAL HISTORY:  Patient  reports that she has never smoked. She has never used smokeless tobacco. She reports that she does not currently use alcohol. She reports that she does not use drugs.   Social History     Socioeconomic History    Marital status:      Spouse name: Not on file    Number of children: Not on file    Years of education: Not on file    Highest education level: Not on file   Occupational History    Not on file   Tobacco Use    Smoking status: Never    Smokeless tobacco: Never   Substance and Sexual Activity    Alcohol use: Not Currently    Drug use: Never    Sexual activity: Not on file   Other Topics Concern    Not on file   Social History Narrative    Not on file     Social Determinants of Health     Financial Resource Strain: Not on file   Food Insecurity: Not on file   Transportation Needs: Not on file   Physical Activity: Not on file   Stress: Not on file   Social Connections: Not on file   Intimate Partner  Violence: Not on file   Housing Stability: Not on file       FAMILY HISTORY:  Family History   Problem Relation Name Age of Onset    Colon cancer Mother      Diabetes Mother      Hypertension Mother      Krystal-Pick disease Father      Heart attack Brother      Diabetes Brother      Stroke Brother         REVIEW OF SYSTEMS:  All systems reviewed, pertinent negatives as noted in HPI.     PHYSICAL EXAM:  There were no vitals taken for this visit.  Constitutional: Well-developed and well-nourished. No distress.    Head: Normocephalic and atraumatic.    Neck: Normal range of motion.    Cardiovascular: Normal rate.    Pulmonary/Chest: Effort normal. No respiratory distress.   Abdominal: Nondistended.  : Deferred.  Musculoskeletal: Normal range of motion.    Neurological: Alert and oriented.  Psychiatric: Normal mood and affect. Thought content normal.      LABORATORY REVIEW:   Lab Results   Component Value Date    BUN 15 06/23/2023    CREATININE 0.73 06/23/2023     06/23/2023    K 4.2 06/23/2023     06/23/2023    CO2 24 06/23/2023    CALCIUM 9.2 06/23/2023      Lab Results   Component Value Date    WBC 14.1 (H) 06/23/2023    RBC 4.46 06/23/2023    HGB 14.1 06/23/2023    HCT 40.7 06/23/2023    MCV 91 06/23/2023    MCHC 34.6 06/23/2023    RDW 13.9 06/23/2023     06/23/2023        Urine dipstick shows positive for WBC's and positive for nitrates.       Assessment:      1. Urinary tract infection with hematuria, site unspecified  POCT UA Automated manually resulted    Urine culture    Microscopic Only, Urine    Urine culture      2. OAB (overactive bladder)  Follow Up In Urology    DISCONTINUED: trospium (Sanctura) 20 mg tablet          Noy Ochoa is a 83 y.o. with urinary incontinence, frequency, PSH hysterectomy  Started on topical estrogen, trial of mirabegron, referred to pelvic floor PT at last visit 9/26/23  HTN with mirabegron  UA today shows likely UTI     Plan:   Urine for culture, will  treat per c/s  Trial of trospium 20 mg po daily to start, may increase to BID if no adverse effects   RTC in 4-6 weeks for reassessment  Encouraged to contact us in the interim with any questions, concerns

## 2023-10-20 LAB
BACTERIA #/AREA URNS AUTO: ABNORMAL /HPF
MUCOUS THREADS #/AREA URNS AUTO: ABNORMAL /LPF
RBC #/AREA URNS AUTO: ABNORMAL /HPF
SQUAMOUS #/AREA URNS AUTO: ABNORMAL /HPF
TRANS CELLS #/AREA UR COMP ASSIST: ABNORMAL /HPF
WBC #/AREA URNS AUTO: >50 /HPF

## 2023-10-22 LAB — BACTERIA UR CULT: ABNORMAL

## 2023-10-23 ENCOUNTER — TELEPHONE (OUTPATIENT)
Dept: UROLOGY | Facility: CLINIC | Age: 83
End: 2023-10-23
Payer: MEDICARE

## 2023-10-23 DIAGNOSIS — R31.9 URINARY TRACT INFECTION WITH HEMATURIA, SITE UNSPECIFIED: Primary | ICD-10-CM

## 2023-10-23 DIAGNOSIS — N39.0 URINARY TRACT INFECTION WITH HEMATURIA, SITE UNSPECIFIED: Primary | ICD-10-CM

## 2023-10-23 RX ORDER — AMOXICILLIN AND CLAVULANATE POTASSIUM 875; 125 MG/1; MG/1
1 TABLET, FILM COATED ORAL 2 TIMES DAILY
Qty: 14 TABLET | Refills: 0 | Status: SHIPPED | OUTPATIENT
Start: 2023-10-23 | End: 2023-10-30

## 2023-11-01 ENCOUNTER — TELEMEDICINE (OUTPATIENT)
Dept: UROLOGY | Facility: CLINIC | Age: 83
End: 2023-11-01
Payer: MEDICARE

## 2023-11-01 ENCOUNTER — OFFICE VISIT (OUTPATIENT)
Dept: PRIMARY CARE | Facility: CLINIC | Age: 83
End: 2023-11-01
Payer: MEDICARE

## 2023-11-01 VITALS
DIASTOLIC BLOOD PRESSURE: 76 MMHG | RESPIRATION RATE: 12 BRPM | HEART RATE: 76 BPM | BODY MASS INDEX: 29.76 KG/M2 | SYSTOLIC BLOOD PRESSURE: 136 MMHG | WEIGHT: 168 LBS

## 2023-11-01 DIAGNOSIS — D64.9 ANEMIA, UNSPECIFIED TYPE: ICD-10-CM

## 2023-11-01 DIAGNOSIS — R21 RASH: Primary | ICD-10-CM

## 2023-11-01 DIAGNOSIS — M81.0 OSTEOPOROSIS, UNSPECIFIED OSTEOPOROSIS TYPE, UNSPECIFIED PATHOLOGICAL FRACTURE PRESENCE: ICD-10-CM

## 2023-11-01 DIAGNOSIS — I10 BENIGN ESSENTIAL HYPERTENSION: ICD-10-CM

## 2023-11-01 DIAGNOSIS — R26.89 POOR BALANCE: ICD-10-CM

## 2023-11-01 DIAGNOSIS — N32.81 OAB (OVERACTIVE BLADDER): Primary | ICD-10-CM

## 2023-11-01 DIAGNOSIS — E78.2 MIXED HYPERLIPIDEMIA: ICD-10-CM

## 2023-11-01 DIAGNOSIS — K21.9 GASTROESOPHAGEAL REFLUX DISEASE WITHOUT ESOPHAGITIS: ICD-10-CM

## 2023-11-01 PROCEDURE — 1159F MED LIST DOCD IN RCRD: CPT | Performed by: NURSE PRACTITIONER

## 2023-11-01 PROCEDURE — 1157F ADVNC CARE PLAN IN RCRD: CPT | Performed by: NURSE PRACTITIONER

## 2023-11-01 PROCEDURE — 1160F RVW MEDS BY RX/DR IN RCRD: CPT | Performed by: INTERNAL MEDICINE

## 2023-11-01 PROCEDURE — 3075F SYST BP GE 130 - 139MM HG: CPT | Performed by: INTERNAL MEDICINE

## 2023-11-01 PROCEDURE — 99213 OFFICE O/P EST LOW 20 MIN: CPT | Performed by: NURSE PRACTITIONER

## 2023-11-01 PROCEDURE — 3078F DIAST BP <80 MM HG: CPT | Performed by: INTERNAL MEDICINE

## 2023-11-01 PROCEDURE — 1126F AMNT PAIN NOTED NONE PRSNT: CPT | Performed by: INTERNAL MEDICINE

## 2023-11-01 PROCEDURE — 1160F RVW MEDS BY RX/DR IN RCRD: CPT | Performed by: NURSE PRACTITIONER

## 2023-11-01 PROCEDURE — 1036F TOBACCO NON-USER: CPT | Performed by: NURSE PRACTITIONER

## 2023-11-01 PROCEDURE — 99214 OFFICE O/P EST MOD 30 MIN: CPT | Performed by: INTERNAL MEDICINE

## 2023-11-01 PROCEDURE — 1036F TOBACCO NON-USER: CPT | Performed by: INTERNAL MEDICINE

## 2023-11-01 PROCEDURE — 1159F MED LIST DOCD IN RCRD: CPT | Performed by: INTERNAL MEDICINE

## 2023-11-01 NOTE — PROGRESS NOTES
Subjective   Patient ID: Noy Ochoa is a 83 y.o. female who presents for No chief complaint on file..    HPI follow-up visit and examination no chest pain no shortness of breath has been to the urologist and has been on trospium and been treated for UTI bowels okay bones muscles joints okay has developed a rash perhaps associated after beginning the new incontinence medicine and the antibiotic has been using hydrocortisone seems to help seemingly recovered from pneumonia    Past medical history hypertension hyperlipidemia vascular dementia osteoporosis osteoarthritis urinary incontinence status post cholecystectomy status post hysterectomy status post pneumonia    Medications noted and unchanged except for incontinence medicines    Allergies aspirin latex    Social history no tobacco no alcohol    Family history mother colon cancer diabetes hypertension father picks disease Brother ASCVD diabetes    Prevention some laboratory results reviewed discussed with mammogram limited exercise discussed with vaccinations    Depression depression improved mood better   Review of Systems    Objective   There were no vitals taken for this visit.    Physical Exam vital signs noted alert and oriented NCAT PERRLA EOMI nares without discharge OP benign TM normal bilateral EAC clear bilateral no AC nodes no JVD or bruit no thyromegaly chest clear to auscultation and percussion CV regular rate and rhythm S1-S2 without murmur gallop or rub abdomen soft nontender normal active bowel sounds LS spine relatively straight and nontender spinous process negative straight leg raise extremities no clubbing cyanosis trace if any edema intact distal pulses DTR 1+ skin there is a macular papular outbreak across the chest and minimally into the upper extremities bilaterally    Assessment/Plan impression hypertension hyperlipidemia glucose intolerance anemia rash depression cognitive impairment GERD urinary incontinence osteoporosis  Plan with  next blood work may recheck on the chemistries if needed may recheck on the blood count and associated vitamin B12 or iron if needed for the mild anemia may consider supplementation for the calcium and vitamin D given the osteoporosis may recheck on the lipids and advise on cholesterol therapy may recheck on the A1c given slight elevation in blood sugars we will follow-up with dermatology requisition to be made regarding a small mole on her nose exactly where her glasses are but also for the rash if no better with Claritin and hydrocortisone and speaking with urology regarding the new medications that she was on okay for physical therapy for balance she goes to the [facility] she has not fallen since starting exercises discussed with vaccinations she will have COVID RSV influenza and eventually Shingrix she may obtain a different home blood pressure cuff given that her blood pressure reading on her cuff was dramatically different than the recorded values even though pulse was off this was a wrist cuff continue with other medications and recheck with blood work in January 2024 TT 50 cc 26

## 2023-11-01 NOTE — PROGRESS NOTES
UROLOGIC FOLLOW-UP VISIT     PROBLEM LIST:  1. OAB (overactive bladder)             HISTORY OF PRESENT ILLNESS:   Noy Ochoa is an 83 y.o. with urinary incontinence, frequency, PSH hysterectomy  Previously on oxybutynin for many years, stopped due to concern for cognitive changes  Started on topical estrogen 9/26/23  HTN on mirabegron; some benefit with trospium started 10/19/23  Klebsiella UTI found at same visit, treated with Augmentin    A telephone visit (audio only) between the patient (at the originating site) and the provider (at the distant site) was utilized to provide this telehealth service.   Verbal consent was requested and obtained from Noy Ochoa on this date, 03/19/24 for a telehealth visit.    INTERVAL HISTORY:  Finished Augmentin on Monday  Notes rash on chest, will start taking Zyrtec  Bout of urgency yesterday lasting half a day  Resolved spontaneously  Has not yet started pelvic floor PT    PAST MEDICAL HISTORY:  Past Medical History:   Diagnosis Date    Combined forms of age-related cataract, left eye 09/06/2022    Combined form of age-related cataract, left eye    Combined forms of age-related cataract, right eye 09/06/2022    Combined form of age-related cataract, right eye    Depression, unspecified 02/23/2022    Depression    Macular drusen, bilateral        PAST SURGICAL HISTORY:  Past Surgical History:   Procedure Laterality Date    CHOLECYSTECTOMY  10/11/2013    Cholecystectomy    HYSTERECTOMY  10/11/2013    Hysterectomy    TONSILLECTOMY  10/11/2013    Tonsillectomy        ALLERGIES:   Allergies   Allergen Reactions    Aspirin Swelling    Food Extracts Swelling     Cod fish, violet, onion and hopps    Violet Unknown    Latex Unknown    Lidocaine Swelling        MEDICATIONS:   Current Outpatient Medications on File Prior to Visit   Medication Sig Dispense Refill    alendronate (Fosamax) 70 mg tablet Take 1 tablet (70 mg) by mouth every 7 days. 4 tablet 0    amLODIPine  (Norvasc) 2.5 mg tablet Take 1 tablet (2.5 mg) by mouth once daily. 30 tablet 0    ascorbic acid, vitamin C, 500 mg capsule Take 500 mg by mouth.      atorvastatin (Lipitor) 40 mg tablet Take 1 tablet (40 mg) by mouth once daily. 30 tablet 0    calcium carbonate (Oscal) 500 mg calcium (1,250 mg) tablet Take 1 tablet (1,250 mg) by mouth once daily.      cholecalciferol (Vitamin D-3) 125 MCG (5000 UT) capsule Take 1 capsule (125 mcg) by mouth.      multivitamin capsule Take by mouth.      pantoprazole (ProtoNix) 40 mg EC tablet Take 1 tablet (40 mg) by mouth once daily. Do not crush, chew, or split. 30 tablet 0     No current facility-administered medications on file prior to visit.        SOCIAL HISTORY:  Patient  reports that she has never smoked. She has never used smokeless tobacco. She reports that she does not currently use alcohol. She reports that she does not use drugs.   Social History     Socioeconomic History    Marital status:      Spouse name: Not on file    Number of children: Not on file    Years of education: Not on file    Highest education level: Not on file   Occupational History    Not on file   Tobacco Use    Smoking status: Never    Smokeless tobacco: Never   Vaping Use    Vaping Use: Never used   Substance and Sexual Activity    Alcohol use: Not Currently    Drug use: Never    Sexual activity: Defer   Other Topics Concern    Not on file   Social History Narrative    Not on file     Social Determinants of Health     Financial Resource Strain: Not on file   Food Insecurity: Not on file   Transportation Needs: Not on file   Physical Activity: Not on file   Stress: Not on file   Social Connections: Not on file   Intimate Partner Violence: Not on file   Housing Stability: Not on file       FAMILY HISTORY:  Family History   Problem Relation Name Age of Onset    Colon cancer Mother      Diabetes Mother      Hypertension Mother      Krystal-Pick disease Father      Heart attack Brother       Diabetes Brother      Stroke Brother         REVIEW OF SYSTEMS:  All systems reviewed, pertinent negatives as noted in HPI.     PHYSICAL EXAM:  There were no vitals taken for this visit.      LABORATORY REVIEW:   Lab Results   Component Value Date    BUN 19 01/08/2024    CREATININE 0.76 01/08/2024    EGFR 79 01/08/2024     01/08/2024    K 4.4 01/08/2024     01/08/2024    CO2 31 01/08/2024    CALCIUM 9.9 01/08/2024      Lab Results   Component Value Date    WBC 8.6 01/08/2024    RBC 4.35 01/08/2024    HGB 12.8 01/08/2024    HCT 39.2 01/08/2024    MCV 90 01/08/2024    MCH 29.4 01/08/2024    MCHC 32.7 01/08/2024    RDW 13.1 01/08/2024     01/08/2024         Assessment:      1. OAB (overactive bladder)            Noy STACEY Ochoa is a 83 y.o. with urinary incontinence, frequency, PSH hysterectomy  Started on topical estrogen, trial of mirabegron, referred to pelvic floor PT at visit 9/26/23  HTN with mirabegron; started on trospium 10/19/23 with benefit  Klebsiella UTI treated with Augmentin ?allergy       Plan:   Encouraged to schedule pelvic floor PT  Continue trospium 20 mg po daily   Continue topical estrogen  RTC in 4-6 weeks for reassessment  Encouraged to contact us in the interim with any questions, concerns

## 2023-11-03 ENCOUNTER — TELEPHONE (OUTPATIENT)
Dept: UROLOGY | Facility: CLINIC | Age: 83
End: 2023-11-03
Payer: MEDICARE

## 2023-11-03 NOTE — TELEPHONE ENCOUNTER
Patient called and said she has developed a rash since being put on the new medication. She did see her PCP on 11/1 who she said recommended Claritin but no relief.    Advised patient that a message will be sent and that if her symptoms worsen over the weekend then she will need to go to the nearest ER.

## 2023-11-09 ENCOUNTER — TELEPHONE (OUTPATIENT)
Dept: PRIMARY CARE | Facility: CLINIC | Age: 83
End: 2023-11-09
Payer: MEDICARE

## 2023-11-09 DIAGNOSIS — R21 RASH: Primary | ICD-10-CM

## 2023-11-09 NOTE — TELEPHONE ENCOUNTER
Pt son called n stating the rash is spreading and wanted to know if she could get a referral for dermatology. Good call back number [phone]

## 2023-11-16 ENCOUNTER — OFFICE VISIT (OUTPATIENT)
Dept: UROLOGY | Facility: CLINIC | Age: 83
End: 2023-11-16
Payer: MEDICARE

## 2023-11-16 DIAGNOSIS — R31.9 URINARY TRACT INFECTION WITH HEMATURIA, SITE UNSPECIFIED: Primary | ICD-10-CM

## 2023-11-16 DIAGNOSIS — R35.0 URINARY FREQUENCY: ICD-10-CM

## 2023-11-16 DIAGNOSIS — N39.0 URINARY TRACT INFECTION WITH HEMATURIA, SITE UNSPECIFIED: Primary | ICD-10-CM

## 2023-11-16 DIAGNOSIS — R32 INCONTINENCE IN FEMALE: ICD-10-CM

## 2023-11-16 LAB
POC APPEARANCE, URINE: CLEAR
POC BILIRUBIN, URINE: NEGATIVE
POC BLOOD, URINE: ABNORMAL
POC COLOR, URINE: YELLOW
POC GLUCOSE, URINE: NEGATIVE MG/DL
POC KETONES, URINE: NEGATIVE MG/DL
POC LEUKOCYTES, URINE: ABNORMAL
POC NITRITE,URINE: POSITIVE
POC PH, URINE: 5.5 PH
POC PROTEIN, URINE: NEGATIVE MG/DL
POC SPECIFIC GRAVITY, URINE: 1.01
POC UROBILINOGEN, URINE: 0.2 EU/DL

## 2023-11-16 PROCEDURE — 1126F AMNT PAIN NOTED NONE PRSNT: CPT | Performed by: NURSE PRACTITIONER

## 2023-11-16 PROCEDURE — 99213 OFFICE O/P EST LOW 20 MIN: CPT | Performed by: NURSE PRACTITIONER

## 2023-11-16 PROCEDURE — 1159F MED LIST DOCD IN RCRD: CPT | Performed by: NURSE PRACTITIONER

## 2023-11-16 PROCEDURE — 1036F TOBACCO NON-USER: CPT | Performed by: NURSE PRACTITIONER

## 2023-11-16 PROCEDURE — 1160F RVW MEDS BY RX/DR IN RCRD: CPT | Performed by: NURSE PRACTITIONER

## 2023-11-16 PROCEDURE — 81003 URINALYSIS AUTO W/O SCOPE: CPT | Performed by: NURSE PRACTITIONER

## 2023-11-16 RX ORDER — SULFAMETHOXAZOLE AND TRIMETHOPRIM 800; 160 MG/1; MG/1
1 TABLET ORAL 2 TIMES DAILY
Qty: 14 TABLET | Refills: 0 | Status: SHIPPED | OUTPATIENT
Start: 2023-11-16 | End: 2023-11-23

## 2023-11-16 NOTE — PROGRESS NOTES
"Subjective   Patient ID: Noy Ochoa is a 83 y.o. female for FUV     HPI  Last visit with Olga Mendez CNP. History of mild dementia. Presents today for evaluation of urinary issues. Reports urinary incontinence and NTF x q 2 hrs. Was taking oxybutynin for ~years, recently stopped due to concern for confusion. Trial of Trospium with allergic response (rash). Minimal sensation of need to urinate before incontinence, No hx CARLTON  Using a brief during the day and a pad + brief at night. Changing brief/pad 2-3 times per day. PMH HTN, MCI  PSH hysterectomy     Review of Systems  All other systems have been reviewed and are negative for complaint.    Objective   Physical Exam  General: Well developed, well nourished, alert and cooperative, appears in no acute distress  Eyes: Non-injected conjunctiva, sclera clear, no proptosis  Cardiac: Extremities are warm and well perfused. No edema, cyanosis or pallor.   Lungs: Breathing is easy, non-labored. Speaking in clear and complete sentences. Normal diaphragmatic movement.  MSK: Ambulatory with steady gait, unassisted  Neuro: alert and oriented to person, place and time  Psych: Demonstrates good judgement and reason, without hallucinations, abnormal affect or abnormal behaviors.  Skin: no obvious lesions, no rashes.    Assessment/Plan   Diagnoses and all orders for this visit:  Urinary tract infection with hematuria, site unspecified  -     POCT UA Automated manually resulted  -     sulfamethoxazole-trimethoprim (Bactrim DS) 800-160 mg tablet; Take 1 tablet by mouth 2 times a day for 7 days.  Urinary frequency  -     Referral to Physical Therapy; Future  Incontinence in female  -     Referral to Physical Therapy; Future    Today we discussed the definition of Overactive Bladder (OAB) as a clinical diagnosis that refers to \"urinary urgency, usually accompanied by frequency and nocturia, with or without urge incontinence, in the absence of urinary tract infection or any " "other obvious pathology.\" We discussed in detail the risk factors for OAB including bladder inflammation, chronic bladder outlet obstruction (BPH, urethral stenosis), central nervous systems disorders, and vaginal delivery of a child, postmenopausal status. I had a long discussion with patient regarding the first line treatment for OAB is behavioral therapy with or without medication therapy. Behavioral therapy include the following elements:     1) Avoid activities that exacerbate incontinence  2) Maintain a voiding diary  3) Timed/scheduled voiding to empty the bladder before incontinence or severe urgency occurs  4) Bladder training  5) Kegel exercises and pelvic floor muscle training  6) Fluid intake management-avoid excessive fluid intake  7) Dietary management-avoid bladder irritants (caffeine, alcohol, spicy food, acidic food)  8) Avoid constipation      We discussed trial medication after treating UTI. We discussed Botox, Interstim, and PTNS therapy     Recommend PFPT. Referral given today      Start Bactrim BID x 7 days.     Follow up in 3 to 4 weeks to re-evaluate symptoms.     All questions and concerns were addressed. Patient verbalizes understanding and has no other questions at this time.     You are able to have email access to your chart. You can sign into [employer] or add the Follow My Health fernando on your smart phone to review today's visit and laboratory work    Jessica Whalen-- JEMAL ALLEN  Office Phone:  [phone]       .  "

## 2023-12-04 DIAGNOSIS — F01.A0 MILD VASCULAR DEMENTIA WITHOUT BEHAVIORAL DISTURBANCE, PSYCHOTIC DISTURBANCE, MOOD DISTURBANCE, OR ANXIETY (MULTI): ICD-10-CM

## 2023-12-06 RX ORDER — SERTRALINE HYDROCHLORIDE 100 MG/1
200 TABLET, FILM COATED ORAL DAILY
Qty: 180 TABLET | Refills: 1 | Status: SHIPPED | OUTPATIENT
Start: 2023-12-06

## 2023-12-10 NOTE — PROGRESS NOTES
Subjective   Patient ID: Noy Ochoa is a 83 y.o. female for FUV     HPI  Previously seen by  Olga Mendez CNP. History of mild dementia. Presents today for evaluation of urinary issues. Reports urinary incontinence and NTF x q 2 hrs. Was taking oxybutynin for years, recently stopped due to concern for confusion. Trial of Trospium with allergic response (rash). Minimal sensation of need to urinate before incontinence, No hx CARLTON  Using a brief during the day and a pad + brief at night. Changing brief/pad 2-3 times per day. PMH HTN, MCI  PSH hysterectomy  No Hx stones  Lab Results   Component Value Date    URINECULTURE >100,000 Klebsiella pneumoniae/variicola (A) 10/19/2023    URINECULTURE Klebsiella pneumoniae (A) 09/26/2023      Urinalysis today: Blood; Trace, Nitrites; Positive, Leukocytes; Large      Review of Systems  All other systems have been reviewed and are negative for complaint.    Objective   Physical Exam  General: Well developed, well nourished, alert and cooperative, appears in no acute distress  Eyes: Non-injected conjunctiva, sclera clear, no proptosis  Cardiac: Extremities are warm and well perfused. No edema, cyanosis or pallor.   Lungs: Breathing is easy, non-labored. Speaking in clear and complete sentences. Normal diaphragmatic movement.  MSK: Ambulatory with steady gait, unassisted  Neuro: alert and oriented to person, place and time  Psych: Demonstrates good judgement and reason, without hallucinations, abnormal affect or abnormal behaviors.  Skin: no obvious lesions, no rashes.    Assessment/Plan   Diagnoses and all orders for this visit:  Urinary tract infection with hematuria, site unspecified  Incontinence in female  Urinary frequency     Pelvic Floor Physical Therapy appointment Pending.     She will be scheduled for UDS     Today we discussed UTI in great detail. We discussed that a definitive diagnosis of UTI requires urine culture. Recurrent UTI occurs when treatment with  antibiotics causes a positive urine culture to turn negative, but then culture becomes positive again. Recurrent UTI arises either from persistent bacteria inside of the  tract, ex/ urolithiasis, foreign bodies, diverticuli or bacterial prostatitis, or from bacteria outside the  tract, ex/ fistula, urinary retention, vesicoureteral reflux, immunosuppression, sexual intercourse).     We will send urine today for analysis and culture. Will treat as clinically indicated.   Start Bactrim BID empirically     To evaluate recurrent UTI, will obtain CT Urogram. You will need laboratory work prior to imaging, please obtain at any  lab.     We discussed UTI prevention in great detail. It is recommended to increase daily water intake to at least 80 ounces per day. It is important to have smooth, daily bowel movements and good bowel health. If you are not, you may take Miralax 17g daily to help with bowel movements. Do not take this if you are having watery or loose stools. You may consider taking a cranberry supplement or D-Mannose daily to prevent bacteria from adhering to your bladder wall. I recommend taking a daily probiotic for good vaginal dean health. We may need to improve the quality of your vaginal tissues, to prevent bacteria from getting into your bladder.     We discussed use vaginal estrogen cream. Will try Estrace cream, explained. She denies personal history of breast or gynecological cancer. I explained that small amounts of estrogen can be detected in the blood with use of Estrace cream and that the patient should follow up regularly for breast and gynecological cancer screening.     We discussed trial medication after treating UTI. We discussed Botox, Interstim, and PTNS therapy     All questions and concerns were addressed. Patient verbalizes understanding and has no other questions at this time.     You are able to have email access to your chart. You can sign into [employer] or add the Follow My  Health fernando on your smart phone to review today's visit and laboratory work    Jessica Whalen-- JEMAL ALLEN  Office Phone:  [phone]       .

## 2023-12-11 ENCOUNTER — OFFICE VISIT (OUTPATIENT)
Dept: UROLOGY | Facility: HOSPITAL | Age: 83
End: 2023-12-11
Payer: MEDICARE

## 2023-12-11 DIAGNOSIS — N39.0 URINARY TRACT INFECTION WITH HEMATURIA, SITE UNSPECIFIED: Primary | ICD-10-CM

## 2023-12-11 DIAGNOSIS — R35.0 URINARY FREQUENCY: ICD-10-CM

## 2023-12-11 DIAGNOSIS — R32 INCONTINENCE IN FEMALE: ICD-10-CM

## 2023-12-11 DIAGNOSIS — R31.9 URINARY TRACT INFECTION WITH HEMATURIA, SITE UNSPECIFIED: Primary | ICD-10-CM

## 2023-12-11 DIAGNOSIS — N39.0 RECURRENT UTI: ICD-10-CM

## 2023-12-11 LAB
POC APPEARANCE, URINE: ABNORMAL
POC BILIRUBIN, URINE: NEGATIVE
POC BLOOD, URINE: ABNORMAL
POC COLOR, URINE: YELLOW
POC GLUCOSE, URINE: NEGATIVE MG/DL
POC KETONES, URINE: NEGATIVE MG/DL
POC LEUKOCYTES, URINE: ABNORMAL
POC NITRITE,URINE: POSITIVE
POC PH, URINE: 6 PH
POC PROTEIN, URINE: NEGATIVE MG/DL
POC SPECIFIC GRAVITY, URINE: >=1.03
POC UROBILINOGEN, URINE: 0.2 EU/DL

## 2023-12-11 PROCEDURE — 87086 URINE CULTURE/COLONY COUNT: CPT | Performed by: NURSE PRACTITIONER

## 2023-12-11 PROCEDURE — 99213 OFFICE O/P EST LOW 20 MIN: CPT | Performed by: NURSE PRACTITIONER

## 2023-12-11 PROCEDURE — 1159F MED LIST DOCD IN RCRD: CPT | Performed by: NURSE PRACTITIONER

## 2023-12-11 PROCEDURE — 1160F RVW MEDS BY RX/DR IN RCRD: CPT | Performed by: NURSE PRACTITIONER

## 2023-12-11 PROCEDURE — 81003 URINALYSIS AUTO W/O SCOPE: CPT | Performed by: NURSE PRACTITIONER

## 2023-12-11 PROCEDURE — 1036F TOBACCO NON-USER: CPT | Performed by: NURSE PRACTITIONER

## 2023-12-11 PROCEDURE — 1126F AMNT PAIN NOTED NONE PRSNT: CPT | Performed by: NURSE PRACTITIONER

## 2023-12-11 PROCEDURE — 81001 URINALYSIS AUTO W/SCOPE: CPT | Performed by: NURSE PRACTITIONER

## 2023-12-11 RX ORDER — SULFAMETHOXAZOLE AND TRIMETHOPRIM 800; 160 MG/1; MG/1
1 TABLET ORAL 2 TIMES DAILY
Qty: 14 TABLET | Refills: 0 | Status: SHIPPED | OUTPATIENT
Start: 2023-12-11 | End: 2023-12-18

## 2023-12-12 ENCOUNTER — LAB (OUTPATIENT)
Dept: LAB | Facility: LAB | Age: 83
End: 2023-12-12
Payer: MEDICARE

## 2023-12-12 DIAGNOSIS — N39.0 RECURRENT UTI: ICD-10-CM

## 2023-12-12 LAB
APPEARANCE UR: ABNORMAL
BILIRUB UR STRIP.AUTO-MCNC: NEGATIVE MG/DL
COLOR UR: ABNORMAL
CREAT SERPL-MCNC: 0.89 MG/DL (ref 0.5–1.05)
GFR SERPL CREATININE-BSD FRML MDRD: 65 ML/MIN/1.73M*2
GLUCOSE UR STRIP.AUTO-MCNC: NEGATIVE MG/DL
KETONES UR STRIP.AUTO-MCNC: NEGATIVE MG/DL
LEUKOCYTE ESTERASE UR QL STRIP.AUTO: ABNORMAL
MUCOUS THREADS #/AREA URNS AUTO: ABNORMAL /LPF
NITRITE UR QL STRIP.AUTO: NEGATIVE
PH UR STRIP.AUTO: 6 [PH]
PROT UR STRIP.AUTO-MCNC: NEGATIVE MG/DL
RBC # UR STRIP.AUTO: NEGATIVE /UL
RBC #/AREA URNS AUTO: ABNORMAL /HPF
SP GR UR STRIP.AUTO: 1.02
SQUAMOUS #/AREA URNS AUTO: ABNORMAL /HPF
UROBILINOGEN UR STRIP.AUTO-MCNC: <2 MG/DL
WBC #/AREA URNS AUTO: >50 /HPF
WBC CLUMPS #/AREA URNS AUTO: ABNORMAL /HPF
YEAST BUDDING #/AREA UR COMP ASSIST: PRESENT /HPF

## 2023-12-12 PROCEDURE — 36415 COLL VENOUS BLD VENIPUNCTURE: CPT

## 2023-12-12 PROCEDURE — 82565 ASSAY OF CREATININE: CPT

## 2023-12-13 ENCOUNTER — OFFICE VISIT (OUTPATIENT)
Dept: NEUROLOGY | Facility: CLINIC | Age: 83
End: 2023-12-13
Payer: MEDICARE

## 2023-12-13 VITALS
DIASTOLIC BLOOD PRESSURE: 65 MMHG | RESPIRATION RATE: 18 BRPM | SYSTOLIC BLOOD PRESSURE: 152 MMHG | BODY MASS INDEX: 30.52 KG/M2 | WEIGHT: 172.3 LBS | HEART RATE: 84 BPM | TEMPERATURE: 98.3 F

## 2023-12-13 DIAGNOSIS — F01.A0 MILD VASCULAR DEMENTIA WITHOUT BEHAVIORAL DISTURBANCE, PSYCHOTIC DISTURBANCE, MOOD DISTURBANCE, OR ANXIETY (MULTI): Primary | ICD-10-CM

## 2023-12-13 DIAGNOSIS — F09 COGNITIVE DISORDER: ICD-10-CM

## 2023-12-13 PROCEDURE — 1036F TOBACCO NON-USER: CPT | Performed by: PSYCHIATRY & NEUROLOGY

## 2023-12-13 PROCEDURE — 99214 OFFICE O/P EST MOD 30 MIN: CPT | Performed by: PSYCHIATRY & NEUROLOGY

## 2023-12-13 PROCEDURE — 3077F SYST BP >= 140 MM HG: CPT | Performed by: PSYCHIATRY & NEUROLOGY

## 2023-12-13 PROCEDURE — 1159F MED LIST DOCD IN RCRD: CPT | Performed by: PSYCHIATRY & NEUROLOGY

## 2023-12-13 PROCEDURE — 1160F RVW MEDS BY RX/DR IN RCRD: CPT | Performed by: PSYCHIATRY & NEUROLOGY

## 2023-12-13 PROCEDURE — 3078F DIAST BP <80 MM HG: CPT | Performed by: PSYCHIATRY & NEUROLOGY

## 2023-12-13 PROCEDURE — 1126F AMNT PAIN NOTED NONE PRSNT: CPT | Performed by: PSYCHIATRY & NEUROLOGY

## 2023-12-13 ASSESSMENT — PATIENT HEALTH QUESTIONNAIRE - PHQ9
1. LITTLE INTEREST OR PLEASURE IN DOING THINGS: NOT AT ALL
SUM OF ALL RESPONSES TO PHQ9 QUESTIONS 1 AND 2: 0
2. FEELING DOWN, DEPRESSED OR HOPELESS: NOT AT ALL

## 2023-12-13 ASSESSMENT — ENCOUNTER SYMPTOMS
DEPRESSION: 0
LOSS OF SENSATION IN FEET: 0
OCCASIONAL FEELINGS OF UNSTEADINESS: 0

## 2023-12-13 ASSESSMENT — PAIN SCALES - GENERAL: PAINLEVEL: 0-NO PAIN

## 2023-12-13 NOTE — PATIENT INSTRUCTIONS
- get driving evaluation  - continue current medications  - follow-up with me in about 6 months with repeat MoCA beforehand    General brain health guidelines:  - make sure your other medical conditions are well controlled (e.g., high blood pressure, high cholesterol, diabetes, sleep apnea etc)  - do not smoke or chew tobacco  - address any sensory deficits (e.g., proper glasses for poor eyesight, hearing aids for hearing loss)  - use a weekly pill box  - eat a heart healthy diet (e.g., lots of fruits and vegetables, low fat and cholesterol)  - exercise at least four days per week, 30 minutes at a time at an intensity that would make it difficult to converse with someone   - make sure you are getting at least 7 hours of quality sleep per night  - keep yourself mentally active daily by reading, playing cards, doing word searches, puzzles, etc.  - stay socially active by being part of a group or organization    Please note that the above may not be an entirely accurate or complete list of your medications, allergies, past medical history, past surgical history, or active problems as the document is completed following your visit.

## 2023-12-13 NOTE — PROGRESS NOTES
Start Time:3:50pm  Stop Time:4:15pm    Chart reviewed, including physician notes and diagnostic studies, for 3 minutes prior to this appointment.      Accompanied by: son    Following the patient's last visit, they were to attempt a trial off of oxybutynin and cease driving.  She has had three UTIs.  Mood is good.  Sleeping well.  Appetite is good.  No passive death wish or suicidal ideation.  Her memory is a little bit worse.  She is not driving, and feels overwhelmed at intersections when driving with others.  No psychosis.  She is still living by herself in a single family home.  There have been discussions about moving to a senior community, the benefits of which we discussed today, particularly in terms of staying socially active.    Allergies   Allergen Reactions    Aspirin Swelling    Food Extracts Swelling     Cod fish, violet, onion and hopps    Violet Unknown    Latex Unknown    Lidocaine Swelling         Current Outpatient Medications:     alendronate (Fosamax) 70 mg tablet, Take 1 tablet (70 mg) by mouth every 7 days., Disp: 4 tablet, Rfl: 0    amLODIPine (Norvasc) 2.5 mg tablet, Take 1 tablet (2.5 mg) by mouth once daily., Disp: 30 tablet, Rfl: 0    ascorbic acid, vitamin C, 500 mg capsule, Take 500 mg by mouth., Disp: , Rfl:     atorvastatin (Lipitor) 40 mg tablet, Take 1 tablet (40 mg) by mouth once daily., Disp: 30 tablet, Rfl: 0    calcium carbonate (Oscal) 500 mg calcium (1,250 mg) tablet, Take 1 tablet (1,250 mg) by mouth once daily., Disp: , Rfl:     cholecalciferol (Vitamin D-3) 125 MCG (5000 UT) capsule, Take 1 capsule (125 mcg) by mouth., Disp: , Rfl:     multivitamin capsule, Take by mouth., Disp: , Rfl:     pantoprazole (ProtoNix) 40 mg EC tablet, Take 1 tablet (40 mg) by mouth once daily. Do not crush, chew, or split., Disp: 30 tablet, Rfl: 0    sertraline (Zoloft) 100 mg tablet, TAKE 2 TABLETS DAILY, Disp: 180 tablet, Rfl: 1    sulfamethoxazole-trimethoprim (Bactrim DS) 800-160 mg tablet,  "Take 1 tablet by mouth 2 times a day for 7 days., Disp: 14 tablet, Rfl: 0    trospium (Sanctura) 20 mg tablet, Take 1 tablet (20 mg) by mouth once daily. (Patient not taking: Reported on 11/16/2023), Disp: 30 tablet, Rfl: 2    Review of Systems  As per HPI, otherwise all other systems have been reviewed are negative for complaint.      Objective   /65   Pulse 84   Temp 36.8 °C (98.3 °F) (Tympanic)   Resp 18   Wt 78.2 kg (172 lb 4.8 oz)   BMI 30.52 kg/m²     EXAMINATION  Mental Status Examination  General appearance: well kept, good eye contact, cooperative  Orientation: alert and oriented to time, place, and person  Motor: within normal limits, gait is stable  Speech: normal rate, tone and rhythm  Mood: euthymic  Affect: Euthymic, full-range  Passive death wish: No  Suicidal ideation: No  Thought process: logical, linear, and goal-directed  Thought content: Hallucinations:no, Delusions: none, denied; and not responding to internal stimuli  Insight/Judgment: Good/Good  Fund of knowledge: Good  Recent and remote memory: Fair/Good  Attention span and concentration: Good  Language: regular, rate, rhythm, tone, and volume and without paraphrasic errors    Reg: 3/3    Season: fall  Year: 2023  Month: Dec  Date: 13th  Day: Wed    President: ?    State: [place]  City: [place]  Place: [facility]  Floor: [facility]  Street: [facility]    Recall: 2/3 (3/3 with cues)    MoCA ( 08/16/2023): 22/30 (-1 visuospatial/executive, -1 attention, -1 language, -3 delayed recall, -1 orientation)  \"f\"=8 words  MIS: 10/15     MoCA (8/25/2021): 26/30 (-1 visuospatial/executive, -2 attention, -1 orientation)     MoCA ( 07/08/2020): 27/30 (-2 visuospatial/executive, -1 attention)  \"f\"=11 words  MIS: 15/5     MoCA (11/12/18): 23/30     MoCA (11/2017): 23/30     MoCA (10/16) 27/30     Formulation: Her cognition may be slightly worse according to her son's report.      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

## 2023-12-14 ENCOUNTER — APPOINTMENT (OUTPATIENT)
Dept: UROLOGY | Facility: CLINIC | Age: 83
End: 2023-12-14
Payer: MEDICARE

## 2023-12-14 LAB — BACTERIA UR CULT: ABNORMAL

## 2023-12-18 ENCOUNTER — APPOINTMENT (OUTPATIENT)
Dept: RADIOLOGY | Facility: CLINIC | Age: 83
End: 2023-12-18
Payer: MEDICARE

## 2023-12-20 ENCOUNTER — PROCEDURE VISIT (OUTPATIENT)
Dept: UROLOGY | Facility: CLINIC | Age: 83
End: 2023-12-20
Payer: MEDICARE

## 2023-12-20 DIAGNOSIS — N39.0 URINARY TRACT INFECTION WITH HEMATURIA, SITE UNSPECIFIED: ICD-10-CM

## 2023-12-20 DIAGNOSIS — R31.9 URINARY TRACT INFECTION WITH HEMATURIA, SITE UNSPECIFIED: ICD-10-CM

## 2023-12-20 DIAGNOSIS — R32 URINARY INCONTINENCE IN FEMALE: ICD-10-CM

## 2023-12-20 PROCEDURE — 51797 INTRAABDOMINAL PRESSURE TEST: CPT | Performed by: STUDENT IN AN ORGANIZED HEALTH CARE EDUCATION/TRAINING PROGRAM

## 2023-12-20 PROCEDURE — 51728 CYSTOMETROGRAM W/VP: CPT | Performed by: STUDENT IN AN ORGANIZED HEALTH CARE EDUCATION/TRAINING PROGRAM

## 2023-12-20 PROCEDURE — 51741 ELECTRO-UROFLOWMETRY FIRST: CPT | Performed by: STUDENT IN AN ORGANIZED HEALTH CARE EDUCATION/TRAINING PROGRAM

## 2023-12-20 PROCEDURE — 51784 ANAL/URINARY MUSCLE STUDY: CPT | Performed by: STUDENT IN AN ORGANIZED HEALTH CARE EDUCATION/TRAINING PROGRAM

## 2023-12-20 RX ORDER — OXYBUTYNIN CHLORIDE 10 MG/1
10 TABLET, EXTENDED RELEASE ORAL DAILY
COMMUNITY

## 2023-12-20 NOTE — PROGRESS NOTES
Noy Ochoa 83 y.o. female    Procedures:  Patient referred by Jessica Whalen CNP for urodynamics to evaluate urge incontinence and recurrent uti. Dr. Mccormack was present in office at time of study. Pre-uroflow was completed today with a pvr of 100 ml. Urine was clear for uti today. Patient currently on Oxybutynin. Patient did not leak on CLPP/VLPP. Patient did have DO w/w/o leak during study. Pvr after study was 0 ml.  Patient instructed to increase fluids if she has any burning or blood in urine. Patient made aware she may have blood rectally due to abdominal catheter insertion.  Patient understood and consented to urodynamics. Patient will follow up with Jessica Whalen CNP to review results. 12/20/2023/nisreen

## 2024-01-05 ENCOUNTER — APPOINTMENT (OUTPATIENT)
Dept: RADIOLOGY | Facility: HOSPITAL | Age: 84
End: 2024-01-05
Payer: MEDICARE

## 2024-01-08 ENCOUNTER — HOSPITAL ENCOUNTER (OUTPATIENT)
Dept: RADIOLOGY | Facility: HOSPITAL | Age: 84
Discharge: HOME | End: 2024-01-08
Payer: MEDICARE

## 2024-01-08 ENCOUNTER — LAB (OUTPATIENT)
Dept: LAB | Facility: LAB | Age: 84
End: 2024-01-08
Payer: MEDICARE

## 2024-01-08 ENCOUNTER — OFFICE VISIT (OUTPATIENT)
Dept: PRIMARY CARE | Facility: CLINIC | Age: 84
End: 2024-01-08
Payer: MEDICARE

## 2024-01-08 VITALS — DIASTOLIC BLOOD PRESSURE: 76 MMHG | SYSTOLIC BLOOD PRESSURE: 138 MMHG

## 2024-01-08 DIAGNOSIS — Z00.00 HEALTH CARE MAINTENANCE: Primary | ICD-10-CM

## 2024-01-08 DIAGNOSIS — E78.2 MIXED HYPERLIPIDEMIA: ICD-10-CM

## 2024-01-08 DIAGNOSIS — F09 COGNITIVE DISORDER: ICD-10-CM

## 2024-01-08 DIAGNOSIS — I10 BENIGN ESSENTIAL HYPERTENSION: ICD-10-CM

## 2024-01-08 DIAGNOSIS — R73.09 ABNORMAL GLUCOSE: ICD-10-CM

## 2024-01-08 DIAGNOSIS — D64.9 ANEMIA, UNSPECIFIED TYPE: ICD-10-CM

## 2024-01-08 DIAGNOSIS — N39.0 RECURRENT UTI: ICD-10-CM

## 2024-01-08 DIAGNOSIS — R26.89 POOR BALANCE: ICD-10-CM

## 2024-01-08 LAB
ANION GAP SERPL CALC-SCNC: 12 MMOL/L (ref 10–20)
BUN SERPL-MCNC: 19 MG/DL (ref 6–23)
CALCIUM SERPL-MCNC: 9.9 MG/DL (ref 8.6–10.6)
CHLORIDE SERPL-SCNC: 105 MMOL/L (ref 98–107)
CHOLEST SERPL-MCNC: 132 MG/DL (ref 0–199)
CHOLESTEROL/HDL RATIO: 2.9
CO2 SERPL-SCNC: 31 MMOL/L (ref 21–32)
CREAT SERPL-MCNC: 0.76 MG/DL (ref 0.5–1.05)
EGFRCR SERPLBLD CKD-EPI 2021: 79 ML/MIN/1.73M*2
ERYTHROCYTE [DISTWIDTH] IN BLOOD BY AUTOMATED COUNT: 13.1 % (ref 11.5–14.5)
EST. AVERAGE GLUCOSE BLD GHB EST-MCNC: 128 MG/DL
GLUCOSE SERPL-MCNC: 108 MG/DL (ref 74–99)
HBA1C MFR BLD: 6.1 %
HCT VFR BLD AUTO: 39.2 % (ref 36–46)
HDLC SERPL-MCNC: 45.9 MG/DL
HGB BLD-MCNC: 12.8 G/DL (ref 12–16)
IRON SATN MFR SERPL: 16 % (ref 25–45)
IRON SERPL-MCNC: 53 UG/DL (ref 35–150)
LDLC SERPL CALC-MCNC: 62 MG/DL
MCH RBC QN AUTO: 29.4 PG (ref 26–34)
MCHC RBC AUTO-ENTMCNC: 32.7 G/DL (ref 32–36)
MCV RBC AUTO: 90 FL (ref 80–100)
NON HDL CHOLESTEROL: 86 MG/DL (ref 0–149)
NRBC BLD-RTO: 0 /100 WBCS (ref 0–0)
PLATELET # BLD AUTO: 276 X10*3/UL (ref 150–450)
POTASSIUM SERPL-SCNC: 4.4 MMOL/L (ref 3.5–5.3)
RBC # BLD AUTO: 4.35 X10*6/UL (ref 4–5.2)
SODIUM SERPL-SCNC: 144 MMOL/L (ref 136–145)
TIBC SERPL-MCNC: 333 UG/DL (ref 240–445)
TRIGL SERPL-MCNC: 122 MG/DL (ref 0–149)
TSH SERPL-ACNC: 3.55 MIU/L (ref 0.44–3.98)
UIBC SERPL-MCNC: 280 UG/DL (ref 110–370)
VIT B12 SERPL-MCNC: 849 PG/ML (ref 211–911)
VLDL: 24 MG/DL (ref 0–40)
WBC # BLD AUTO: 8.6 X10*3/UL (ref 4.4–11.3)

## 2024-01-08 PROCEDURE — 83540 ASSAY OF IRON: CPT

## 2024-01-08 PROCEDURE — 3075F SYST BP GE 130 - 139MM HG: CPT | Performed by: INTERNAL MEDICINE

## 2024-01-08 PROCEDURE — 80061 LIPID PANEL: CPT

## 2024-01-08 PROCEDURE — 76376 3D RENDER W/INTRP POSTPROCES: CPT | Performed by: RADIOLOGY

## 2024-01-08 PROCEDURE — 84443 ASSAY THYROID STIM HORMONE: CPT

## 2024-01-08 PROCEDURE — 1126F AMNT PAIN NOTED NONE PRSNT: CPT | Performed by: INTERNAL MEDICINE

## 2024-01-08 PROCEDURE — 80048 BASIC METABOLIC PNL TOTAL CA: CPT

## 2024-01-08 PROCEDURE — 74178 CT ABD&PLV WO CNTR FLWD CNTR: CPT | Performed by: RADIOLOGY

## 2024-01-08 PROCEDURE — 99213 OFFICE O/P EST LOW 20 MIN: CPT | Performed by: INTERNAL MEDICINE

## 2024-01-08 PROCEDURE — 83550 IRON BINDING TEST: CPT

## 2024-01-08 PROCEDURE — 36415 COLL VENOUS BLD VENIPUNCTURE: CPT

## 2024-01-08 PROCEDURE — 82607 VITAMIN B-12: CPT

## 2024-01-08 PROCEDURE — 1036F TOBACCO NON-USER: CPT | Performed by: INTERNAL MEDICINE

## 2024-01-08 PROCEDURE — 85027 COMPLETE CBC AUTOMATED: CPT

## 2024-01-08 PROCEDURE — 83036 HEMOGLOBIN GLYCOSYLATED A1C: CPT

## 2024-01-08 PROCEDURE — 2550000001 HC RX 255 CONTRASTS: Performed by: NURSE PRACTITIONER

## 2024-01-08 PROCEDURE — 3078F DIAST BP <80 MM HG: CPT | Performed by: INTERNAL MEDICINE

## 2024-01-08 PROCEDURE — 76377 3D RENDER W/INTRP POSTPROCES: CPT

## 2024-01-08 RX ADMIN — IOHEXOL 75 ML: 350 INJECTION, SOLUTION INTRAVENOUS at 15:14

## 2024-01-08 NOTE — PROGRESS NOTES
Subjective   Patient ID: Noy Ochoa is a 83 y.o. female who presents for No chief complaint on file..    HPI Follow-up visit no chest pain no shortness of breath much polyuria polydipsia but not dysuria has had multiple urinary evaluations no side effect with medication no abdominal discomfort no diarrhea    Review of Systems    Objective   There were no vitals taken for this visit.  Vital signs noted alert and oriented NCAT no JVD  Physical Exam no pallor no lid lag no thyromegaly chest clear to auscultation CV regular rate and rhythm S1-S2 abdomen soft nontender normal active bowel sounds extremities no clubbing cyanosis or edema normal distal pulses walking with cane    Assessment/Plan impression hypertension hyperlipidemia glucose intolerance poor balance cognitive anemia  Plan needs physical therapy orders sent to private physical therapist requisition to be made check glycohemoglobin advised on long-term blood sugar test check Chem-7 advised on glucose potassium and kidney function follow-up with urology check CBC vitamin B12 and iron advised on blood count and supplements check TSH advised on cognition and follow-up with neurology check lipid panel advised on cholesterol profile advised on blood pressure medication good diet exercise as able and recheck based on above

## 2024-01-10 DIAGNOSIS — K57.30 DIVERTICULA OF COLON: Primary | ICD-10-CM

## 2024-01-25 ENCOUNTER — TREATMENT (OUTPATIENT)
Dept: PHYSICAL THERAPY | Facility: CLINIC | Age: 84
End: 2024-01-25
Payer: MEDICARE

## 2024-01-25 DIAGNOSIS — R35.0 URINARY FREQUENCY: ICD-10-CM

## 2024-01-25 DIAGNOSIS — R32 INCONTINENCE IN FEMALE: ICD-10-CM

## 2024-01-25 PROCEDURE — 97162 PT EVAL MOD COMPLEX 30 MIN: CPT | Mod: GP | Performed by: PHYSICAL THERAPIST

## 2024-01-25 PROCEDURE — 97535 SELF CARE MNGMENT TRAINING: CPT | Mod: GP | Performed by: PHYSICAL THERAPIST

## 2024-01-25 NOTE — PROGRESS NOTES
Physical Therapy  Physical Therapy Pelvic Floor Evaluation    Name: Noy Ochoa  MRN: [id number]  : 1940  Today's Date: 24          Insurance: [employer] Medicare  Visit # 1 of MN for   - also completing ortho/falls PT at [facility]    Current Problem:  1. Urinary frequency  Referral to Physical Therapy      2. Incontinence in female  Referral to Physical Therapy          General  Reason for Referral: Urinary Incontinence  Referred By: Jessica Whalen  Precautions     Vital Signs     Pain       Subjective  Chief Complaint: urinary incontinence, unable to hold urine at all  - was in the hospital for 2 weeks with COVID in   - was in [facility], 1 week [facility]  - recurrent UTI  - not currently on antibiotics  - physician says she shouldn't drive due to COVID brain fog (re-scheduling driving test)  - did not have incontinence prior to COVID  Onset:   REGINALD: unknown    Current Condition:  same    PAI0  Intensity (0-10): 0    Relevant Information (PMH & Previous Tests/Imaging):   - CT Urography: awaiting follow up visit with Jessica  Previous Interventions/Treatments:     Prior Level of Function (PLOF)  Exercise/Physical Activity: walking, driving independently, exercise classes  Work/School: retired -     Treatment Goals: continence, stop leaking urine, have more control    OB/GYN HISTORY:   Number of Pregnancies (): 3  Number of Births (Para): 3  Vaginal = 3  Difficult Labor? Yes first one, awake during delivery, 2nd delivery was induced  Prolapse? YES    Other Reproductive Symptoms “X” next to those that apply   X Menopause (no menstrual cycle for 1 year), When did it begin?  - hysterectomy at 46yr    Pelvic Pain   Other:      Painful intercourse or vaginal penetration? NO     Red Flags: Do you have any of the following? NO  Fever/chills, unexplained weight changes, dizziness/fainting, unexplained change in bowel or bladder functions, unexplained malaise or muscle weakness,  night pain/sweats, numbness or tingling    Bladder & Bowel Symptoms: “X” next to those that apply   Trouble initiating urine stream  Blood in urine    Urination in intermittent/slow stream  Painful urination    Trouble emptying bladder  Trouble feeling bladder urge/fullness   X Difficulty stopping the urine stream  Current laxative use    Trouble emptying bladder completely  Trouble feeling bowel/urge/fullness    Straining or pushing to empty  Constipation/straining    Dribbling after urination X Trouble holding back gas/feces  - when urge is strong   X Urine leakage X Recurrent bladder infections/UTIs   Other:        BLADDER  Frequency of Urination  Daytime:  unsure  Nighttime: 3-4x/night  When you have a normal urge to urinate, how long can you delay before going? NO  Do you completely empty your bladder? Unsure  Urinary Incontinence/Leakage  Frequency (day/week/month)? DAILY  Present with cough and/or sneeze? Sometimes  Present with physical activity and/or exertion? Sometimes  How much urine do you leak? Entire bladder  Do you wear any barriers, such as pantishields or pads? 2 layers: Lined underwear & large/long pad  How many are required in 24 hours? 3x    Average Fluid Intake (glasses/day): coffee (3 cups decaf), juice (cranberry/orange) 3-4x6oz, milk 2x8oz), water  Education:   Recommendation: 50% of your body wt (pounds) in fluid ounces  Bladder Irritants: caffeine, artificial sweeteners, carbonated beverages, alcohol      BOWEL     Frequency of Bowel Movements: 1x every day or every other day  When you have an urge to have a bowel movement, how long can you delay before going? Sometimes  Stool Form: [place] Scale = type 4    Average Fiber Intake (per day): fruits and veggies  Education: Basic Recommendation: 25-30g/day        Objective  Patient was educated on pelvic floor anatomy, function, and dysfunction.   Patient was educated on an orthopedic assessment & external pelvic floor assessment technique and  verbalized consent.   The patient is aware they have full autonomy and can stop the assessment at any time.     Standing Assessment:   Posture: forward head, rounded shoulders, decreased lumbar lordosis  Gait: unilateral cane, decreased gait speed, bilateral compensated trendelenburg, decreased step length bilaterally  Standing Lumbar Mobility: moderate limitation all direction    Supine Movement Assessment   SLR: doming of core, pelvic tilt present  Bridge: cramping in hamstrings  MMT: sup hip flex: 4- bilaterally  MMT: sup hip ext: 3+ bilaterally  MMT: sup hip abd: 3+ bilaterally  MMT: sup hip add: 3+ bilaterally    Supine Mobility Assessment  - Hamstrings: mild tightness bilaterally  - Piriformis: moderate tightness bilaterally    OUTCOMES MEASURE:  NIH-CPSI  -Pain: 0  - Urinary Symp: 5  - QOL: 8    Goals:   Active       PT Problem       PT Goal 1       Start:  01/25/24    Expected End:  04/24/24       Patient will return to prior level of function with minimal to no pain and without limitations for participation in ADLs, health, and exercise.   Patient demonstrate home exercise program adherence to supplement symptom reduction and functional gains made in clinic  Patient will reports minimal to no pain for participation in ADLs and return to PLOF.   Patient will demonstrate coordination of pelvic floor, strength & relaxation wnl for return to ADLs and PLOF without restriction.   Pt will demonstrate improvement on the outcome measure score to demonstrate overall improved functional abilities and quality of life.              Education: home exercise program, plan of care, activity modifications, pain management, and injury pathology       Treatments: education as noted above    Plan for Next Visit: bladder diary, core/hip/pelvic floor strength    Assessment: Patient presents with signs and symptoms consistent with functional incontinence, urge incontinence, muscle weakness, resulting in limited participation in  pain-free ADLs and inability to perform at their prior level of function. Pt would benefit from physical therapy to address the impairments found & listed previously in the objective section in order to return to safe and pain-free ADLs and prior level of function.    Plan:   Planned Interventions include: therapeutic exercise, self-care home management, manual therapy, therapeutic activities, gait training, neuromuscular coordination, aquatic therapy  Frequency: 1x/every other week  Duration: 12 weeks, 6 visits     Priscilla Bolden, PT

## 2024-02-01 ENCOUNTER — TREATMENT (OUTPATIENT)
Dept: PHYSICAL THERAPY | Facility: CLINIC | Age: 84
End: 2024-02-01
Payer: MEDICARE

## 2024-02-01 DIAGNOSIS — R35.0 URINARY FREQUENCY: Primary | ICD-10-CM

## 2024-02-01 DIAGNOSIS — R32 INCONTINENCE IN FEMALE: ICD-10-CM

## 2024-02-01 PROCEDURE — 97535 SELF CARE MNGMENT TRAINING: CPT | Mod: GP | Performed by: PHYSICAL THERAPIST

## 2024-02-01 PROCEDURE — 97112 NEUROMUSCULAR REEDUCATION: CPT | Mod: GP | Performed by: PHYSICAL THERAPIST

## 2024-02-01 PROCEDURE — 97110 THERAPEUTIC EXERCISES: CPT | Mod: GP | Performed by: PHYSICAL THERAPIST

## 2024-02-01 NOTE — PROGRESS NOTES
"Physical Therapy  Physical Therapy Pelvic Floor Treatment    Name: Noy Ochoa  MRN: [id number]  : 1940  Today's Date: 24          Insurance: [employer]  Visit # 2 of MN for   - also completing ortho/falls PT at [facility]    Current Problem:  1. Urinary frequency        2. Incontinence in female            General     Precautions     Vital Signs     Pain       Subjective  Chief Complaint: urinary incontinence, unable to hold urine at all  - was in the hospital for 2 weeks with COVID in   - was in [facility] ([place], [place]), 1 week [facility]  - recurrent UTI  - not currently on antibiotics  - physician says she shouldn't drive due to COVID brain fog (re-scheduling driving test)  - did not have incontinence prior to COVID  Onset:   REGINALD: unknown    Today:   - reports no pain  - hep compliance: with Terry functional/strength exercises \"I try\"    PAIN  Intensity (0-10): 0     Relevant Information (PMH & Previous Tests/Imaging):   - CT Urography: awaiting follow up visit with Jessica  Previous Interventions/Treatments:     Prior Level of Function (PLOF)  Exercise/Physical Activity: walking, driving independently, exercise classes  Work/School: retired -     Treatment Goals: continence, stop leaking urine, have more control    Relevant History:   GP: 3/3  Prolapse? YES    Bladder & Bowel Symptoms:   - Difficulty stopping urine stream  - urine leakage  - trouble holding back gas/feces when urge is strong  - recurrent bladder infections/UTIs    Bladder:   - unable to hold/delay with urge  - Nocturia: 3-4x/night  Nighttime: 3-4x/night  Urinary Incontinence/Leakage  Frequency (day/week/month)? DAILY  Present with cough and/or sneeze? Sometimes  Present with physical activity and/or exertion? Sometimes  How much urine do you leak? Entire bladder  Do you wear any barriers, such as pantishields or pads? 2 layers: Lined underwear & large/long pad  How many are required in 24 hours? " 3x    Objective  Patient was educated on pelvic floor anatomy, function, and dysfunction.   Patient was educated on an orthopedic assessment & external pelvic floor assessment technique and verbalized consent.   The patient is aware they have full autonomy and can stop the assessment at any time.     Standing Assessment:   Posture: forward head, rounded shoulders, decreased lumbar lordosis  Gait: unilateral cane, decreased gait speed, bilateral compensated trendelenburg, decreased step length bilaterally  Standing Lumbar Mobility: moderate limitation all direction    Supine Movement Assessment   SLR: doming of core, pelvic tilt present  Bridge: cramping in hamstrings  MMT: sup hip flex: 4- bilaterally  MMT: sup hip ext: 3+ bilaterally  MMT: sup hip abd: 3+ bilaterally  MMT: sup hip add: 3+ bilaterally    Supine Mobility Assessment  - Hamstrings: mild tightness bilaterally  - Piriformis: moderate tightness bilaterally    Treatments:  Discussion:   - Bladder Diary  - Internal Pelvic Floor Assessment Options - decided to complete next visit  - Terry will continue functional/ortho/gait training PT & we will continue direct/localized PF PT  - Pressure Management = breathing/exhaling with transfers and exercises to decrease pressure on pelvic floor    Access Code: [email or web address] - updated, printed, reviewed  URL: [email or web address]  Date: 02/01/2024  Prepared by: Priscilla Bolden    Exercises  - Supine Posterior Pelvic Tilt  - 3-5 x weekly - 2-3 sets - 10 reps  - Supine March with Posterior Pelvic Tilt  - 3-5 x weekly - 2-3 sets - 10 reps  - Posterior Pelvic Tilt with Adduction Using Ball in Supine  - 3-5 x weekly - 2-3 sets - 10 reps  - Hooklying Clamshell with Resistance  - 3-5 x weekly - 2-3 sets - 10 reps  - Beginner Bridge  - 3-5 x weekly - 2-3 sets - 10 reps    - Urgency initially increased to 2/3 with exercises, bathroom break within session  - Utilized breathing and relaxation/distraction to  assist with urge and getting to the bathroom    Plan for Next Visit: internal assessment    Assessment: Patient presents with signs and symptoms consistent with functional incontinence, urge incontinence, muscle weakness, resulting in limited participation in pain-free ADLs and inability to perform at their prior level of function. Pt would benefit from physical therapy to address the impairments found & listed previously in the objective section in order to return to safe and pain-free ADLs and prior level of function.    Plan:   Planned Interventions include: therapeutic exercise, self-care home management, manual therapy, therapeutic activities, gait training, neuromuscular coordination, aquatic therapy  Frequency: 1x/every other week  Duration: 12 weeks, 6 visits     Priscilla Bolden, PT

## 2024-02-06 NOTE — PROGRESS NOTES
Subjective   Patient ID: Noy Ochoa is a 83 y.o. female for FUV     HPI  Previously seen by  Olga Mendez CNP. History of mild dementia. Presents today for evaluation of urinary issues. Reports urinary incontinence and NTF x q 2 hrs. Was taking oxybutynin for years, recently stopped due to concern for confusion. Trial of Trospium with allergic response (rash). Minimal sensation of need to urinate before incontinence, No hx CARLTON  Using a brief during the day and a pad + brief at night. Changing brief/pad 2-3 times per day. PMH HTN, MCI  PSH hysterectomy  No Hx stones    01/08/2024 CT Urography w 3D demonstrates   1. Normal CT appearance of the urinary tract system as above.  Specifically, no evidence for hydronephrosis, hydroureter, urinary  tract stones or perinephric stranding/masses identified.  2. Cholecystectomy and hysterectomy changes.  3. Mild circumferential mucosal wall thickening involving a short  segment of ascending colon proximally which may be related to  incomplete colonic distention. Correlation with colonoscopy findings  recommended.  4. Colonic diverticulosis but no CT evidence for acute diverticulitis.  5. Degenerative changes of the spine. No destructive bone lesions.  6. Additional detailed findings as above.    Past Medical History:   Diagnosis Date    Combined forms of age-related cataract, left eye 09/06/2022    Combined form of age-related cataract, left eye    Combined forms of age-related cataract, right eye 09/06/2022    Combined form of age-related cataract, right eye    Depression, unspecified 02/23/2022    Depression         Lab Results   Component Value Date    URINECULTURE >100,000 Escherichia coli (A) 12/11/2023    URINECULTURE >100,000 Klebsiella pneumoniae/variicola (A) 10/19/2023    URINECULTURE Klebsiella pneumoniae (A) 09/26/2023      Review of Systems  All other systems have been reviewed and are negative for complaint.    Objective   Physical Exam  General: Well  developed, well nourished, alert and cooperative, appears in no acute distress  Eyes: Non-injected conjunctiva, sclera clear, no proptosis  Cardiac: Extremities are warm and well perfused. No edema, cyanosis or pallor.   Lungs: Breathing is easy, non-labored. Speaking in clear and complete sentences. Normal diaphragmatic movement.  MSK: Ambulatory with steady gait, unassisted  Neuro: alert and oriented to person, place and time  Psych: Demonstrates good judgement and reason, without hallucinations, abnormal affect or abnormal behaviors.  Skin: no obvious lesions, no rashes.    Assessment/Plan   Diagnoses and all orders for this visit:  Recurrent UTI  Urinary incontinence in female  Urinary frequency     Previously seen by  Olga Mendez CNP. History of mild dementia. Presents today for evaluation of urinary issues. Reports urinary incontinence and NTF x q 2 hrs. Was taking oxybutynin for years, recently stopped due to concern for confusion. Trial of Trospium with allergic response (rash). Minimal sensation of need to urinate before incontinence, No hx CARLTON  Using a brief during the day and a pad + brief at night. Changing brief/pad 2-3 times per day. PMH HTN, MCI  PSH hysterectomy  No Hx stones    01/08/2024 CT Urography w 3D demonstrates   1. Normal CT appearance of the urinary tract system as above.  Specifically, no evidence for hydronephrosis, hydroureter, urinary  tract stones or perinephric stranding/masses identified.  2. Cholecystectomy and hysterectomy changes.  3. Mild circumferential mucosal wall thickening involving a short  segment of ascending colon proximally which may be related to  incomplete colonic distention. Correlation with colonoscopy findings  recommended.  4. Colonic diverticulosis but no CT evidence for acute diverticulitis.  5. Degenerative changes of the spine. No destructive bone lesions.  6. Additional detailed findings as above.    Referral to Gastroenterology  Patient went to  Pelvic Floor Physical Therapy x 2, does not feel it was helpful. We discussed UDS, Botox,   Interstim, and PTNS therapy. She does not want to proceed with further intervention at this time.     (Recurrent UTI)   Today we discussed UTI in great detail. We discussed that a definitive diagnosis of UTI requires urine culture. Recurrent UTI occurs when treatment with antibiotics causes a positive urine culture to turn negative, but then culture becomes positive again. Recurrent UTI arises either from persistent bacteria inside of the  tract, ex/ urolithiasis, foreign bodies, diverticuli or bacterial prostatitis, or from bacteria outside the  tract, ex/ fistula, urinary retention, vesicoureteral reflux, immunosuppression, sexual intercourse).     We discussed UTI prevention in great detail. It is recommended to increase daily water intake to at least 80 ounces per day. It is important to have smooth, daily bowel movements and good bowel health. If you are not, you may take Miralax 17g daily to help with bowel movements. Do not take this if you are having watery or loose stools. You may consider taking a cranberry supplement or D-Mannose daily to prevent bacteria from adhering to your bladder wall. I recommend taking a daily probiotic for good vaginal dean health. We may need to improve the quality of your vaginal tissues, to prevent bacteria from getting into your bladder. We discussed use vaginal estrogen cream. Patient declines.     Patient will follow up in 6 months, sooner if needed.     All questions and concerns were addressed. Patient verbalizes understanding and has no other questions at this time.   You are able to have email access to your chart. You can sign into [employer] or add the Follow My Health fernando on your smart phone to review today's visit and laboratory work    Jessica Whalen-- JEMAL ALLEN  Office Phone:  [phone]       .

## 2024-02-07 ENCOUNTER — OFFICE VISIT (OUTPATIENT)
Dept: UROLOGY | Facility: HOSPITAL | Age: 84
End: 2024-02-07
Payer: MEDICARE

## 2024-02-07 DIAGNOSIS — R35.0 URINARY FREQUENCY: ICD-10-CM

## 2024-02-07 DIAGNOSIS — K63.89 COLON DISTENTION: ICD-10-CM

## 2024-02-07 DIAGNOSIS — N39.0 RECURRENT UTI: Primary | ICD-10-CM

## 2024-02-07 DIAGNOSIS — R32 URINARY INCONTINENCE IN FEMALE: ICD-10-CM

## 2024-02-07 PROCEDURE — 1157F ADVNC CARE PLAN IN RCRD: CPT | Performed by: NURSE PRACTITIONER

## 2024-02-07 PROCEDURE — 1126F AMNT PAIN NOTED NONE PRSNT: CPT | Performed by: NURSE PRACTITIONER

## 2024-02-07 PROCEDURE — 99213 OFFICE O/P EST LOW 20 MIN: CPT | Performed by: NURSE PRACTITIONER

## 2024-02-07 PROCEDURE — 1036F TOBACCO NON-USER: CPT | Performed by: NURSE PRACTITIONER

## 2024-02-08 ENCOUNTER — LAB (OUTPATIENT)
Dept: LAB | Facility: LAB | Age: 84
End: 2024-02-08
Payer: MEDICARE

## 2024-02-08 DIAGNOSIS — N39.0 RECURRENT UTI: ICD-10-CM

## 2024-02-08 LAB
APPEARANCE UR: ABNORMAL
BACTERIA #/AREA URNS AUTO: ABNORMAL /HPF
BILIRUB UR STRIP.AUTO-MCNC: NEGATIVE MG/DL
CAOX CRY #/AREA UR COMP ASSIST: ABNORMAL /HPF
COLOR UR: ABNORMAL
GLUCOSE UR STRIP.AUTO-MCNC: NEGATIVE MG/DL
KETONES UR STRIP.AUTO-MCNC: NEGATIVE MG/DL
LEUKOCYTE ESTERASE UR QL STRIP.AUTO: ABNORMAL
MUCOUS THREADS #/AREA URNS AUTO: ABNORMAL /LPF
NITRITE UR QL STRIP.AUTO: NEGATIVE
PH UR STRIP.AUTO: 5 [PH]
PROT UR STRIP.AUTO-MCNC: ABNORMAL MG/DL
RBC # UR STRIP.AUTO: NEGATIVE /UL
RBC #/AREA URNS AUTO: ABNORMAL /HPF
SP GR UR STRIP.AUTO: 1.02
SQUAMOUS #/AREA URNS AUTO: ABNORMAL /HPF
UROBILINOGEN UR STRIP.AUTO-MCNC: <2 MG/DL
WBC #/AREA URNS AUTO: >50 /HPF
WBC CLUMPS #/AREA URNS AUTO: ABNORMAL /HPF

## 2024-02-08 PROCEDURE — 87186 SC STD MICRODIL/AGAR DIL: CPT

## 2024-02-08 PROCEDURE — 81001 URINALYSIS AUTO W/SCOPE: CPT

## 2024-02-08 PROCEDURE — 87086 URINE CULTURE/COLONY COUNT: CPT

## 2024-02-11 LAB — BACTERIA UR CULT: ABNORMAL

## 2024-02-13 DIAGNOSIS — N39.0 RECURRENT UTI: Primary | ICD-10-CM

## 2024-02-13 RX ORDER — SULFAMETHOXAZOLE AND TRIMETHOPRIM 800; 160 MG/1; MG/1
1 TABLET ORAL 2 TIMES DAILY
Qty: 14 TABLET | Refills: 0 | Status: SHIPPED | OUTPATIENT
Start: 2024-02-13 | End: 2024-02-20

## 2024-02-19 ASSESSMENT — EXTERNAL EXAM - RIGHT EYE: OD_EXAM: NORMAL

## 2024-02-19 ASSESSMENT — SLIT LAMP EXAM - LIDS
COMMENTS: GOOD POSITION, DERMATOCHALASIS
COMMENTS: GOOD POSITION, DERMATOCHALASIS

## 2024-02-19 ASSESSMENT — CUP TO DISC RATIO
OS_RATIO: .2
OD_RATIO: .2

## 2024-02-19 ASSESSMENT — EXTERNAL EXAM - LEFT EYE: OS_EXAM: NORMAL

## 2024-02-19 NOTE — PROGRESS NOTES
Combined form of age-related cataract, right eyeH25.811  Combined form of age-related cataract, left eyeH25.812  -Visually significant cataract left eye. BCVA: 20/40. Glare: 20/70. Symptoms: Gradual decrease in vision x years. Difficulty reading small print and seeing words/numbers on TV. A change in glasses prescription will not result in significant visual improvement at this time.  Indication/anticipated outcome for cataract surgery: To potentially improve visual acuity and improve quality of life/reduce symptoms. To obtain a better view of the retina/optic nerve. To reduce anisometropia).  Based on a comprehensive eye exam performed February 20, 2024, a visually significant cataract appears to be the source of decreased vision, diminished quality of life, and impairment of activities of daily living. Discussed option of cataract surgery vs observation. Patient can no longer function adequately with current best corrected visual acuity and wishes to have cataract surgery at this time. Discussed surgical procedure with patient. Discussed potential risks, benefits, and complications of cataract surgery including but not limited to pain, bleeding, infection, inflammation, edema, increased eye pressure, retinal tear/detachment, lens dislocation, ptosis, iris damage, need for additional surgery, need for glasses after surgery, loss of vision/loss of eye. Patient understands and wishes to proceed. All questions were answered. Will schedule cataract surgery left eye. Will evaluate other eye following cataract surgery left eye. Lenstar done Lenstar done 6/11/19, 3/9/21,9/6/22, and 8/15/23.  -Pentacam (3/9/21) - OD: Irregular. 43.3/44.0 @ 102.5. OS: Centrally regular. 43.5/44.0 @ 102.9.   -Pentacam (9/6/22) - OD: Regular oblique.  43.5/43.6@11.9.  OS: Irregular.  43.2/23.7@159.2.  -Pentacam (8/15/23) - OD: Irregular. 43.5/44.0 @ 126.0. OS: Irregular. 43.4/43.6 @ 115.5.   Discussed IOL options (standard monofocal,  monofocal with monovision, toric, multifocal). Lens chosen: Standard monofocal. Defer/decline toric/multifocal lens at this time.  Aim: [place] to -0.50. Had thorough discussion with patient re: aim. Discussed that may potentially need glasses for best vision both at distance and at near.   Dominance: right eye  Special considerations: Will plan to use trypan blue due to dense cataract and poor red reflex. May use epishugarcaine and possible Malyugin Ring/iris hooks if poor dilation on day of surgery. NO lidocaine due to allergy. No R.   Best contact number: [phone]  Drops:   -Ketorolac (or Diclofenac) and Ofloxacin 4x/day starting 1 day prior to surgery; Prednisolone acetate 1% 4x/day starting after surgery    Macular ctxfluU92.369  -OCT macula (8/15/23) - Normal thickness and contour OU.  Intact IS-OS OU.  No edema OU.  Few scattered drusen OU. 248/244.  Stable from 12/7/2021.  -Not visually significant at this time. Monitor.     BxjpihwpyP41.00  YyxopzzwvuwR75.209  FdvferidzrN41.4  -Patient lost current glasses. Advised caution with driving at night.  -Defer new Rx. No significant improvement in vision with refraction at this time.       No history of intraocular surgery/refractive surgery.   No FH of AMD/glaucoma

## 2024-02-20 ENCOUNTER — OFFICE VISIT (OUTPATIENT)
Dept: OPHTHALMOLOGY | Facility: CLINIC | Age: 84
End: 2024-02-20
Payer: MEDICARE

## 2024-02-20 DIAGNOSIS — H52.03 HYPEROPIA, BILATERAL: ICD-10-CM

## 2024-02-20 DIAGNOSIS — H25.812 COMBINED FORM OF AGE-RELATED CATARACT, LEFT EYE: ICD-10-CM

## 2024-02-20 DIAGNOSIS — H25.811 COMBINED FORM OF AGE-RELATED CATARACT, RIGHT EYE: Primary | ICD-10-CM

## 2024-02-20 DIAGNOSIS — H52.203 ASTIGMATISM OF BOTH EYES, UNSPECIFIED TYPE: ICD-10-CM

## 2024-02-20 DIAGNOSIS — H52.4 PRESBYOPIA: ICD-10-CM

## 2024-02-20 DIAGNOSIS — H35.363 MACULAR DRUSEN, BILATERAL: ICD-10-CM

## 2024-02-20 PROCEDURE — 99214 OFFICE O/P EST MOD 30 MIN: CPT | Performed by: OPHTHALMOLOGY

## 2024-02-20 RX ORDER — ESTRADIOL 0.1 MG/G
CREAM VAGINAL
COMMUNITY
Start: 2023-09-26 | End: 2024-02-28 | Stop reason: WASHOUT

## 2024-02-20 RX ORDER — TETRACAINE HYDROCHLORIDE 5 MG/ML
1 SOLUTION OPHTHALMIC ONCE
Status: CANCELLED | OUTPATIENT
Start: 2024-02-20 | End: 2024-02-20

## 2024-02-20 RX ORDER — CYCLOPENTOLATE HYDROCHLORIDE 10 MG/ML
1 SOLUTION/ DROPS OPHTHALMIC
Status: CANCELLED | OUTPATIENT
Start: 2024-02-20 | End: 2024-02-20

## 2024-02-20 RX ORDER — PHENYLEPHRINE HYDROCHLORIDE 100 MG/ML
1 SOLUTION/ DROPS OPHTHALMIC
Status: CANCELLED | OUTPATIENT
Start: 2024-02-20 | End: 2024-02-20

## 2024-02-20 ASSESSMENT — VISUAL ACUITY
OS_CC: 20/40
CORRECTION_TYPE: GLASSES
OD_BAT_MED: 20/60
OS_BAT_MED: 20/70
METHOD: SNELLEN - LINEAR
OD_CC: 20/40

## 2024-02-20 ASSESSMENT — REFRACTION_WEARINGRX
OD_SPHERE: +1.00
OS_AXIS: 090
OS_SPHERE: +1.50
OD_CYLINDER: -0.50
OS_ADD: +2.50
OS_CYLINDER: -0.50
OD_AXIS: 090
OD_ADD: +2.50

## 2024-02-20 ASSESSMENT — REFRACTION_MANIFEST
OD_ADD: +2.50
OS_ADD: +2.50
OD_SPHERE: +1.00
OD_CYLINDER: -0.50
OS_SPHERE: +1.50
OD_AXIS: 090
OS_AXIS: 090
OS_CYLINDER: -0.50

## 2024-02-20 ASSESSMENT — ENCOUNTER SYMPTOMS
HEMATOLOGIC/LYMPHATIC NEGATIVE: 0
EYES NEGATIVE: 1
RESPIRATORY NEGATIVE: 0
NEUROLOGICAL NEGATIVE: 0
ALLERGIC/IMMUNOLOGIC NEGATIVE: 0
GASTROINTESTINAL NEGATIVE: 0
ENDOCRINE NEGATIVE: 0
MUSCULOSKELETAL NEGATIVE: 0
CONSTITUTIONAL NEGATIVE: 0
PSYCHIATRIC NEGATIVE: 0
CARDIOVASCULAR NEGATIVE: 0

## 2024-02-20 ASSESSMENT — TONOMETRY
IOP_METHOD: GOLDMANN APPLANATION
OD_IOP_MMHG: 16
OS_IOP_MMHG: 17

## 2024-02-22 ENCOUNTER — APPOINTMENT (OUTPATIENT)
Dept: PHYSICAL THERAPY | Facility: CLINIC | Age: 84
End: 2024-02-22
Payer: MEDICARE

## 2024-02-27 ENCOUNTER — TELEPHONE (OUTPATIENT)
Dept: PRIMARY CARE | Facility: CLINIC | Age: 84
End: 2024-02-27

## 2024-02-28 ENCOUNTER — OFFICE VISIT (OUTPATIENT)
Dept: GASTROENTEROLOGY | Facility: CLINIC | Age: 84
End: 2024-02-28
Payer: MEDICARE

## 2024-02-28 VITALS
HEIGHT: 63 IN | BODY MASS INDEX: 29.95 KG/M2 | DIASTOLIC BLOOD PRESSURE: 74 MMHG | SYSTOLIC BLOOD PRESSURE: 149 MMHG | HEART RATE: 90 BPM | WEIGHT: 169 LBS | TEMPERATURE: 97.2 F

## 2024-02-28 DIAGNOSIS — K57.30 DIVERTICULA OF COLON: ICD-10-CM

## 2024-02-28 DIAGNOSIS — N39.0 URINARY TRACT INFECTION WITHOUT HEMATURIA, SITE UNSPECIFIED: Primary | ICD-10-CM

## 2024-02-28 PROCEDURE — 1036F TOBACCO NON-USER: CPT | Performed by: NURSE PRACTITIONER

## 2024-02-28 PROCEDURE — 99203 OFFICE O/P NEW LOW 30 MIN: CPT | Performed by: NURSE PRACTITIONER

## 2024-02-28 PROCEDURE — 99213 OFFICE O/P EST LOW 20 MIN: CPT | Performed by: NURSE PRACTITIONER

## 2024-02-28 PROCEDURE — 1157F ADVNC CARE PLAN IN RCRD: CPT | Performed by: NURSE PRACTITIONER

## 2024-02-28 PROCEDURE — 3078F DIAST BP <80 MM HG: CPT | Performed by: NURSE PRACTITIONER

## 2024-02-28 PROCEDURE — 1159F MED LIST DOCD IN RCRD: CPT | Performed by: NURSE PRACTITIONER

## 2024-02-28 PROCEDURE — 1160F RVW MEDS BY RX/DR IN RCRD: CPT | Performed by: NURSE PRACTITIONER

## 2024-02-28 PROCEDURE — 3077F SYST BP >= 140 MM HG: CPT | Performed by: NURSE PRACTITIONER

## 2024-02-28 PROCEDURE — 1126F AMNT PAIN NOTED NONE PRSNT: CPT | Performed by: NURSE PRACTITIONER

## 2024-02-28 ASSESSMENT — ENCOUNTER SYMPTOMS
RESPIRATORY NEGATIVE: 1
HEMATOLOGIC/LYMPHATIC NEGATIVE: 1
EYES NEGATIVE: 1
ENDOCRINE NEGATIVE: 1
CARDIOVASCULAR NEGATIVE: 1
NEUROLOGICAL NEGATIVE: 1
PSYCHIATRIC NEGATIVE: 1
CONSTITUTIONAL NEGATIVE: 1
MUSCULOSKELETAL NEGATIVE: 1
ALLERGIC/IMMUNOLOGIC NEGATIVE: 1
GASTROINTESTINAL NEGATIVE: 1

## 2024-02-28 NOTE — PATIENT INSTRUCTIONS
" Recurrent UTI\"s - you are not having any gi factors that seem to be leading to the increase in the UTI's. I would recommend using a squatty potty to help your bowels and bladder empty better. I would recommend no fredy- wipes, you can use a spray bottle with water and pat yourself dry to prevent further irritation.  Dreft soap may be beneficial to wash under garments to prevent irritation as well as as checking to see if there is any perfumes in the pads that you use.    I would not recommend a screening colonoscopy at this time, we have discussed this and are in agreement.    It was nice to meet you and your friend today. I will see you back for follow up as needed.  "

## 2024-02-28 NOTE — PROGRESS NOTES
"Subjective   Patient ID: Noy Ochoa is a 83 y.o. female who presents for New Patient Visit.  HPI    83-year-old female for new patient visit after referral from urologist for recurrent uti's  Has been on 4 antibiotics for UTI's    Medical history includes hypertension, HPL, anemia  8/2/2018 negative Cologuard  Labs reviewed 1/8/2024  TSH 3.55  Vitamin B12 849  Iron 53  Hemoglobin A1c 6.1%  Normal CBC  BM; daily and complete  No stool coming through vaginal area  No blood or melena  Never had a colonoscopy  Mom- colon cancer- age 77.  Weight stable  No n/v  Had pelvic floor therapy  No fiber supplement  Eating fruits and vegetables   Drinking water  Has to get up at night every 2 hrs to urinate    Review of Systems   Constitutional: Negative.    HENT: Negative.     Eyes: Negative.    Respiratory: Negative.     Cardiovascular: Negative.    Gastrointestinal: Negative.    Endocrine: Negative.    Genitourinary: Negative.    Musculoskeletal: Negative.    Skin: Negative.    Allergic/Immunologic: Negative.    Neurological: Negative.    Hematological: Negative.    Psychiatric/Behavioral: Negative.         Objective   Physical Exam  Constitutional:       Appearance: Normal appearance.   HENT:      Head: Normocephalic.      Nose: Nose normal.   Eyes:      Pupils: Pupils are equal, round, and reactive to light.   Cardiovascular:      Rate and Rhythm: Normal rate and regular rhythm.   Abdominal:      General: Bowel sounds are normal.      Palpations: Abdomen is soft.   Musculoskeletal:         General: Normal range of motion.      Cervical back: Normal range of motion.   Skin:     General: Skin is warm and dry.   Neurological:      Mental Status: She is alert.   Psychiatric:         Mood and Affect: Mood normal.         Assessment/Plan        Recurrent UTI\"s - you are not having any gi factors that seem to be leading to the increase in the UTI's. I would recommend using a squatty potty to help your bowels and bladder empty " better. I would recommend no fredy- wipes, you can use a spray bottle with water and pat yourself dry to prevent further irritation.  Dreft soap may be beneficial to wash under garments to prevent irritation as well as as checking to see if there is any perfumes in the pads that you use.    I would not recommend a screening colonoscopy at this time, we have discussed this and are in agreement.    It was nice to meet you and your friend today. I will see you back for follow up as needed.    Carla Au, TIFFANY-CNP 02/28/24 2:38 PM

## 2024-03-07 ENCOUNTER — APPOINTMENT (OUTPATIENT)
Dept: PHYSICAL THERAPY | Facility: CLINIC | Age: 84
End: 2024-03-07
Payer: MEDICARE

## 2024-03-21 ENCOUNTER — APPOINTMENT (OUTPATIENT)
Dept: PHYSICAL THERAPY | Facility: CLINIC | Age: 84
End: 2024-03-21
Payer: MEDICARE

## 2024-04-04 ENCOUNTER — APPOINTMENT (OUTPATIENT)
Dept: PHYSICAL THERAPY | Facility: CLINIC | Age: 84
End: 2024-04-04
Payer: MEDICARE

## 2024-04-09 ENCOUNTER — TREATMENT (OUTPATIENT)
Dept: PHYSICAL THERAPY | Facility: CLINIC | Age: 84
End: 2024-04-09
Payer: MEDICARE

## 2024-04-09 DIAGNOSIS — R32 INCONTINENCE IN FEMALE: ICD-10-CM

## 2024-04-09 DIAGNOSIS — R35.0 URINARY FREQUENCY: Primary | ICD-10-CM

## 2024-04-09 PROCEDURE — 97110 THERAPEUTIC EXERCISES: CPT | Mod: GP | Performed by: PHYSICAL THERAPIST

## 2024-04-09 PROCEDURE — 97535 SELF CARE MNGMENT TRAINING: CPT | Mod: GP | Performed by: PHYSICAL THERAPIST

## 2024-04-09 PROCEDURE — 97140 MANUAL THERAPY 1/> REGIONS: CPT | Mod: GP | Performed by: PHYSICAL THERAPIST

## 2024-04-09 NOTE — PROGRESS NOTES
Physical Therapy  Physical Therapy Pelvic Floor Treatment    Name: Noy Ochoa  MRN: [id number]  : 1940  Today's Date: 24     Time Calculation  Start Time: 1430  Stop Time: 1540  Time Calculation (min): 70 min    Insurance: A[employer]na Medicare  Visit # 3 of [place] for   - also completing ortho/falls PT at [facility] (9 total visits for )    Current Problem:  1. Urinary frequency  Follow Up In Physical Therapy      2. Incontinence in female  Follow Up In Physical Therapy          General     Precautions     Vital Signs     Pain       Subjective  Chief Complaint: urinary incontinence, unable to hold urine at all    Today:   - reports no control of urine (sometimes can make it to the bathroom, most of the time she can't)  - reports unable to track her fluid intake  - reports leakage with any type of physical exertion, sit to stand   - only occasionally will she get a slight indication that she needs to go to the restroom  - hep compliance: yes  - everything is the same symptoms since last visit  - no current antibiotic use & no current UTI    PAIN  Intensity (0-10): 0     Relevant Information (PMH & Previous Tests/Imaging):   - CT Urography: awaiting follow up visit with Jessica  Previous Interventions/Treatments:     Prior Level of Function (PLOF)  Exercise/Physical Activity: walking, driving independently, exercise classes  Work/School: retired -     Treatment Goals: continence, stop leaking urine, have more control    Relevant History:   GP: 3/3  Prolapse? YES    Bladder & Bowel Symptoms:   - Difficulty stopping urine stream  - urine leakage  - trouble holding back gas/feces when urge is strong  - recurrent bladder infections/UTIs    Bladder:   - unable to hold/delay with urge  - Nocturia: 3-4x/night  Nighttime: 3-4x/night  Urinary Incontinence/Leakage  Frequency (day/week/month)? DAILY  Present with cough and/or sneeze? Sometimes  Present with physical activity and/or exertion?  Sometimes  How much urine do you leak? Entire bladder  Do you wear any barriers, such as pantishields or pads? 2 layers: Lined underwear & large/long pad  How many are required in 24 hours? 3x    Objective  Patient was educated on pelvic floor anatomy, function, and dysfunction.   Patient was educated on an orthopedic assessment & external pelvic floor assessment technique and verbalized consent.   The patient is aware they have full autonomy and can stop the assessment at any time.     Standing Assessment:   Posture: forward head, rounded shoulders, decreased lumbar lordosis  Gait: unilateral cane, decreased gait speed, bilateral compensated trendelenburg, decreased step length bilaterally  Standing Lumbar Mobility: moderate limitation all direction    Supine Movement Assessment   SLR: doming of core, pelvic tilt present  Bridge: cramping in hamstrings  MMT: sup hip flex: 4- bilaterally  MMT: sup hip ext: 3+ bilaterally  MMT: sup hip abd: 3+ bilaterally  MMT: sup hip add: 3+ bilaterally    Supine Mobility Assessment  - Hamstrings: mild tightness bilaterally  - Piriformis: moderate tightness bilaterally    Patient was educated on pelvic floor anatomy, function, and dysfunction.   Patient was educated on an intra-vaginal pelvic floor assessment technique and verbalized consent.   The patient is aware they have full autonomy and can stop the assessment at any time.     Perineal Observation - At Rest: visual vulvovaginal atrophy present  Contraction: visual external co-contraction of glutes and adductors  Relaxation: minimal relaxation  Descent: none    Bony Palpation - no tenderness noted    Internal Intra-vaginal Exam  - Layer 1/2/3: layers 1&2 painful and tightness/spasm present (anteriorly more than posteriorly)  - Layer 3 Strength-MMT = 0  Reference Values  0 = None  1 = flicker  2 = contract only  3 = moderate (posterior > anterior)  4 = contract, lift, compression present  5 = strong lift & compression with  posterior resistance    Treatments:  Discussion:   - Terry will continue functional/ortho/gait training PT & we will continue direct/localized PF PT  - Pressure Management = breathing/exhaling with transfers and exercises to decrease pressure on pelvic floor    Access Code: [email or web address] - ON HOLD  - Supine Posterior Pelvic Tilt  - 3-5 x weekly - 2-3 sets - 10 reps  - Supine March with Posterior Pelvic Tilt  - 3-5 x weekly - 2-3 sets - 10 reps  - Posterior Pelvic Tilt with Adduction Using Ball in Supine  - 3-5 x weekly - 2-3 sets - 10 reps  - Hooklying Clamshell with Resistance  - 3-5 x weekly - 2-3 sets - 10 reps  - Beginner Bridge  - 3-5 x weekly - 2-3 sets - 10 reps    Access Code: [email or web address]  - Supine Lower Trunk Rotation  - 1 x daily - 10-20 reps  - Supine Butterfly Groin Stretch  - 1 x daily - 3 sets - 15 seconds hold  - Sidelying Thoracic Rotation with Open Book  - 1 x daily - 5-10 reps - 5 seconds hold  - Seated Hamstring Stretch  - 1 x daily - 7 x weekly - 3 sets - 10 reps  - Seated Child's Pose with Table  - 1 x daily - 7 x weekly - 3 sets - 10 reps    - flute playing for breath-work, 3-5 minutes each day    Plan for Next Visit: assess response to hep    Assessment: Patient presents with signs and symptoms consistent with functional incontinence, urge incontinence, muscle weakness, resulting in limited participation in pain-free ADLs and inability to perform at their prior level of function. Pt would benefit from physical therapy to address the impairments found & listed previously in the objective section in order to return to safe and pain-free ADLs and prior level of function.  - intravaginal PF assessment revealed major tightness/spasm of pelvic floor musculature likely contributing to urgency/frequency/incontinence  - updated and provided with external stretching program    Plan:   Planned Interventions include: therapeutic exercise, self-care home management, manual therapy, therapeutic activities, gait  training, neuromuscular coordination, aquatic therapy  Frequency: 1x/every other week  Duration: 12 weeks, 6 visits     Priscilla Bolden, PT

## 2024-04-23 DIAGNOSIS — H25.811 COMBINED FORM OF AGE-RELATED CATARACT, RIGHT EYE: Primary | ICD-10-CM

## 2024-04-23 DIAGNOSIS — H25.812 COMBINED FORM OF AGE-RELATED CATARACT, LEFT EYE: ICD-10-CM

## 2024-04-23 RX ORDER — OFLOXACIN 3 MG/ML
SOLUTION/ DROPS OPHTHALMIC
Qty: 5 ML | Refills: 2 | Status: SHIPPED | OUTPATIENT
Start: 2024-04-23

## 2024-04-23 RX ORDER — PREDNISOLONE ACETATE 10 MG/ML
SUSPENSION/ DROPS OPHTHALMIC
Qty: 5 ML | Refills: 2 | Status: SHIPPED | OUTPATIENT
Start: 2024-04-23

## 2024-04-23 RX ORDER — KETOROLAC TROMETHAMINE 5 MG/ML
SOLUTION OPHTHALMIC
Qty: 5 ML | Refills: 2 | Status: SHIPPED | OUTPATIENT
Start: 2024-04-23

## 2024-04-26 ENCOUNTER — TREATMENT (OUTPATIENT)
Dept: PHYSICAL THERAPY | Facility: CLINIC | Age: 84
End: 2024-04-26
Payer: MEDICARE

## 2024-04-26 DIAGNOSIS — R32 INCONTINENCE IN FEMALE: ICD-10-CM

## 2024-04-26 DIAGNOSIS — R35.0 URINARY FREQUENCY: Primary | ICD-10-CM

## 2024-04-26 PROCEDURE — 97535 SELF CARE MNGMENT TRAINING: CPT | Mod: GP | Performed by: PHYSICAL THERAPIST

## 2024-04-26 NOTE — PROGRESS NOTES
"Physical Therapy  Physical Therapy Pelvic Floor Treatment    Name: Noy Ochoa  MRN: [id number]  : 1940  Today's Date: 24          Insurance: A[employer]na Medicare  Visit # 4 of [place] for   - also completing ortho/falls PT at [facility] (11 total visits for )    Current Problem:  1. Urinary frequency        2. Incontinence in female            General     Precautions     Vital Signs     Pain       Subjective  Chief Complaint: urinary incontinence, unable to hold urine at all    Today:   - reports symptoms are about the same  - hep compliance: so-so/moderate  - barriers to exercise compliance: she reports \"herself\"    PAIN  Intensity (0-10): 0     Relevant Information (PMH & Previous Tests/Imaging):   - CT Urography: awaiting follow up visit with Jessica  Previous Interventions/Treatments:     Prior Level of Function (PLOF)  Exercise/Physical Activity: walking, driving independently, exercise classes  Work/School: retired -     Treatment Goals: continence, stop leaking urine, have more control    Objective  Posture: forward head, rounded shoulders, decreased lumbar lordosis (improved posture upon arrival)   Gait: unilateral cane, decreased gait speed, bilateral compensated trendelenburg, decreased step length bilaterally (no cane to/from bathroom)  Standing Lumbar Mobility: moderate limitation all direction  SLR: doming of core, pelvic tilt present  Bridge: cramping in hamstrings  MMT: sup hip flex: 4- bilaterally  MMT: sup hip ext: 3+ bilaterally  MMT: sup hip abd: 3+ bilaterally  MMT: sup hip add: 3+ bilaterally  - Hamstrings: mild tightness bilaterally  - Piriformis: moderate tightness bilaterally    Perineal Observation - At Rest: visual vulvovaginal atrophy present  Contraction: visual external co-contraction of glutes and adductors  Relaxation: minimal relaxation  Descent: none  Bony Palpation - no tenderness noted  Internal Intra-vaginal Exam  - Layer 1/2/3: layers 1&2 " painful and tightness/spasm present (anteriorly more than posteriorly)  - Layer 3 Strength-MMT = 0    Treatments:  Discussion:   - Terry will continue functional/ortho/gait training PT & we will continue direct/localized PF PT  - Pressure Management = breathing/exhaling with transfers and exercises to decrease pressure on pelvic floor  - follow-up after eye surgery and some time to get back to regular performance of hep    Access Code: [email or web address] - ON HOLD  - Supine Posterior Pelvic Tilt  - 3-5 x weekly - 2-3 sets - 10 reps  - Supine March with Posterior Pelvic Tilt  - 3-5 x weekly - 2-3 sets - 10 reps  - Posterior Pelvic Tilt with Adduction Using Ball in Supine  - 3-5 x weekly - 2-3 sets - 10 reps  - Hooklying Clamshell with Resistance  - 3-5 x weekly - 2-3 sets - 10 reps  - Beginner Bridge  - 3-5 x weekly - 2-3 sets - 10 reps    Access Code: [email or web address] - emphasis on continued stretching exercises, 3-5x/week for 6-8 weeks for progress to be made  - Supine Lower Trunk Rotation  - 1 x daily - 10-20 reps  - Supine Butterfly Groin Stretch  - 1 x daily - 3 sets - 15 seconds hold  - Sidelying Thoracic Rotation with Open Book  - 1 x daily - 5-10 reps - 5 seconds hold  - Seated Hamstring Stretch  - 1 x daily - 7 x weekly - 3 sets - 10 reps  - Seated Child's Pose with Table  - 1 x daily - 7 x weekly - 3 sets - 10 reps    - flute playing for breath-work, 3-5 minutes each day    Plan for Next Visit: assess response to hep and compliance  - discontinue care if compliance hasn't improved    Assessment: Patient presents with signs and symptoms consistent with functional incontinence, urge incontinence, muscle weakness, resulting in limited participation in pain-free ADLs and inability to perform at their prior level of function. Pt would benefit from physical therapy to address the impairments found & listed previously in the objective section in order to return to safe and pain-free ADLs and prior level of function.  - moderate  compliance with hep      Plan:   Planned Interventions include: therapeutic exercise, self-care home management, manual therapy, therapeutic activities, gait training, neuromuscular coordination, aquatic therapy  Frequency: 1x/every other week  Duration: 12 weeks, 6 visits     Priscilla Bolden, PT

## 2024-04-29 ENCOUNTER — TELEPHONE (OUTPATIENT)
Dept: OPHTHALMOLOGY | Facility: CLINIC | Age: 84
End: 2024-04-29
Payer: MEDICARE

## 2024-04-29 NOTE — TELEPHONE ENCOUNTER
Pt called and had questions regarding her upcoming cataract surgery left eye with Dr. Last on 5/2/24. Pt wanted to know about if she was getting a lens implant, I explain yes as Dr. Last discussed on last visit, she will take out your natural lens and replace with a IOL plastic lens, set for distance, but may still need distance gls rx for best vision after surgery and will need reading glasses to be able to read. After long discussion, pt expressed verbal understanding. PT then asked to confirm location of surgery and post op apt day after surgery, which I did, surgery at [facility] and next day at [facility]. Pt also confirmed that she has her drops. Pt asked when does she arrive for surgery, I explain that she will find out the day before surgery in the PM-receive a call. PT sts I answered all her questions, told pt to call back at [phone] if she has any further questions regarding her cataract surgery. Pt expressed verbal understanding of all information given.

## 2024-05-01 ENCOUNTER — ANESTHESIA EVENT (OUTPATIENT)
Dept: OPERATING ROOM | Facility: CLINIC | Age: 84
End: 2024-05-01
Payer: MEDICARE

## 2024-05-02 ENCOUNTER — ANESTHESIA (OUTPATIENT)
Dept: OPERATING ROOM | Facility: CLINIC | Age: 84
End: 2024-05-02
Payer: MEDICARE

## 2024-05-02 ENCOUNTER — HOSPITAL ENCOUNTER (OUTPATIENT)
Facility: CLINIC | Age: 84
Setting detail: OUTPATIENT SURGERY
Discharge: HOME | End: 2024-05-02
Attending: OPHTHALMOLOGY | Admitting: OPHTHALMOLOGY
Payer: MEDICARE

## 2024-05-02 VITALS
HEART RATE: 81 BPM | TEMPERATURE: 98.6 F | HEIGHT: 64 IN | DIASTOLIC BLOOD PRESSURE: 68 MMHG | SYSTOLIC BLOOD PRESSURE: 142 MMHG | WEIGHT: 171.52 LBS | OXYGEN SATURATION: 94 % | BODY MASS INDEX: 29.28 KG/M2 | RESPIRATION RATE: 16 BRPM

## 2024-05-02 DIAGNOSIS — H25.811 COMBINED FORM OF AGE-RELATED CATARACT, RIGHT EYE: Primary | ICD-10-CM

## 2024-05-02 DIAGNOSIS — H25.812 COMBINED FORM OF AGE-RELATED CATARACT, LEFT EYE: ICD-10-CM

## 2024-05-02 PROCEDURE — 7100000010 HC PHASE TWO TIME - EACH INCREMENTAL 1 MINUTE: Performed by: OPHTHALMOLOGY

## 2024-05-02 PROCEDURE — 3600000003 HC OR TIME - INITIAL BASE CHARGE - PROCEDURE LEVEL THREE: Performed by: OPHTHALMOLOGY

## 2024-05-02 PROCEDURE — 2760000001 HC OR 276 NO HCPCS: Performed by: OPHTHALMOLOGY

## 2024-05-02 PROCEDURE — 2500000001 HC RX 250 WO HCPCS SELF ADMINISTERED DRUGS (ALT 637 FOR MEDICARE OP): Performed by: OPHTHALMOLOGY

## 2024-05-02 PROCEDURE — 2500000005 HC RX 250 GENERAL PHARMACY W/O HCPCS: Performed by: OPHTHALMOLOGY

## 2024-05-02 PROCEDURE — 7100000009 HC PHASE TWO TIME - INITIAL BASE CHARGE: Performed by: OPHTHALMOLOGY

## 2024-05-02 PROCEDURE — 3700000001 HC GENERAL ANESTHESIA TIME - INITIAL BASE CHARGE: Performed by: OPHTHALMOLOGY

## 2024-05-02 PROCEDURE — 3600000008 HC OR TIME - EACH INCREMENTAL 1 MINUTE - PROCEDURE LEVEL THREE: Performed by: OPHTHALMOLOGY

## 2024-05-02 PROCEDURE — 2500000004 HC RX 250 GENERAL PHARMACY W/ HCPCS (ALT 636 FOR OP/ED)

## 2024-05-02 PROCEDURE — 66984 XCAPSL CTRC RMVL W/O ECP: CPT | Performed by: OPHTHALMOLOGY

## 2024-05-02 PROCEDURE — 3700000002 HC GENERAL ANESTHESIA TIME - EACH INCREMENTAL 1 MINUTE: Performed by: OPHTHALMOLOGY

## 2024-05-02 PROCEDURE — A66984 PR REMV CATARACT EXTRACAP,INSERT LENS

## 2024-05-02 PROCEDURE — 99100 ANES PT EXTEME AGE<1 YR&>70: CPT

## 2024-05-02 DEVICE — IMPLANTABLE DEVICE: Type: IMPLANTABLE DEVICE | Site: EYE | Status: FUNCTIONAL

## 2024-05-02 RX ORDER — CYCLOPENTOLATE HYDROCHLORIDE 10 MG/ML
1 SOLUTION/ DROPS OPHTHALMIC
Status: COMPLETED | OUTPATIENT
Start: 2024-05-02 | End: 2024-05-02

## 2024-05-02 RX ORDER — TETRACAINE HYDROCHLORIDE 5 MG/ML
SOLUTION OPHTHALMIC AS NEEDED
Status: DISCONTINUED | OUTPATIENT
Start: 2024-05-02 | End: 2024-05-02 | Stop reason: HOSPADM

## 2024-05-02 RX ORDER — FENTANYL CITRATE 50 UG/ML
INJECTION, SOLUTION INTRAMUSCULAR; INTRAVENOUS AS NEEDED
Status: DISCONTINUED | OUTPATIENT
Start: 2024-05-02 | End: 2024-05-02

## 2024-05-02 RX ORDER — POVIDONE-IODINE 5 %
SOLUTION, NON-ORAL OPHTHALMIC (EYE) AS NEEDED
Status: DISCONTINUED | OUTPATIENT
Start: 2024-05-02 | End: 2024-05-02 | Stop reason: HOSPADM

## 2024-05-02 RX ORDER — OXYCODONE HYDROCHLORIDE 5 MG/1
5 TABLET ORAL EVERY 4 HOURS PRN
Status: DISCONTINUED | OUTPATIENT
Start: 2024-05-02 | End: 2024-05-02 | Stop reason: HOSPADM

## 2024-05-02 RX ORDER — ONDANSETRON HYDROCHLORIDE 2 MG/ML
4 INJECTION, SOLUTION INTRAVENOUS ONCE AS NEEDED
Status: DISCONTINUED | OUTPATIENT
Start: 2024-05-02 | End: 2024-05-02 | Stop reason: HOSPADM

## 2024-05-02 RX ORDER — PHENYLEPHRINE HYDROCHLORIDE 100 MG/ML
1 SOLUTION/ DROPS OPHTHALMIC
Status: COMPLETED | OUTPATIENT
Start: 2024-05-02 | End: 2024-05-02

## 2024-05-02 RX ORDER — TETRACAINE HYDROCHLORIDE 5 MG/ML
1 SOLUTION OPHTHALMIC ONCE
Status: COMPLETED | OUTPATIENT
Start: 2024-05-02 | End: 2024-05-02

## 2024-05-02 RX ORDER — SODIUM CHLORIDE, SODIUM LACTATE, POTASSIUM CHLORIDE, CALCIUM CHLORIDE 600; 310; 30; 20 MG/100ML; MG/100ML; MG/100ML; MG/100ML
100 INJECTION, SOLUTION INTRAVENOUS CONTINUOUS
Status: DISCONTINUED | OUTPATIENT
Start: 2024-05-02 | End: 2024-05-02 | Stop reason: HOSPADM

## 2024-05-02 RX ORDER — ACETAMINOPHEN 325 MG/1
650 TABLET ORAL EVERY 4 HOURS PRN
Status: DISCONTINUED | OUTPATIENT
Start: 2024-05-02 | End: 2024-05-02 | Stop reason: HOSPADM

## 2024-05-02 RX ORDER — LIDOCAINE IN NACL,ISO-OSMOT/PF 30 MG/3 ML
0.1 SYRINGE (ML) INJECTION ONCE
Status: DISCONTINUED | OUTPATIENT
Start: 2024-05-02 | End: 2024-05-02 | Stop reason: HOSPADM

## 2024-05-02 RX ORDER — FENTANYL CITRATE 50 UG/ML
50 INJECTION, SOLUTION INTRAMUSCULAR; INTRAVENOUS EVERY 5 MIN PRN
Status: DISCONTINUED | OUTPATIENT
Start: 2024-05-02 | End: 2024-05-02 | Stop reason: HOSPADM

## 2024-05-02 RX ADMIN — CYCLOPENTOLATE HYDROCHLORIDE 1 DROP: 10 SOLUTION/ DROPS OPHTHALMIC at 12:25

## 2024-05-02 RX ADMIN — CYCLOPENTOLATE HYDROCHLORIDE 1 DROP: 10 SOLUTION/ DROPS OPHTHALMIC at 12:35

## 2024-05-02 RX ADMIN — PHENYLEPHRINE HYDROCHLORIDE 1 DROP: 100 SOLUTION/ DROPS OPHTHALMIC at 12:35

## 2024-05-02 RX ADMIN — FENTANYL CITRATE 25 MCG: 50 INJECTION, SOLUTION INTRAMUSCULAR; INTRAVENOUS at 13:00

## 2024-05-02 RX ADMIN — FENTANYL CITRATE 25 MCG: 50 INJECTION, SOLUTION INTRAMUSCULAR; INTRAVENOUS at 12:57

## 2024-05-02 RX ADMIN — PHENYLEPHRINE HYDROCHLORIDE 1 DROP: 100 SOLUTION/ DROPS OPHTHALMIC at 12:30

## 2024-05-02 RX ADMIN — CYCLOPENTOLATE HYDROCHLORIDE 1 DROP: 10 SOLUTION/ DROPS OPHTHALMIC at 12:30

## 2024-05-02 RX ADMIN — TETRACAINE HYDROCHLORIDE 1 DROP: 5 SOLUTION OPHTHALMIC at 12:25

## 2024-05-02 RX ADMIN — PHENYLEPHRINE HYDROCHLORIDE 1 DROP: 100 SOLUTION/ DROPS OPHTHALMIC at 12:25

## 2024-05-02 ASSESSMENT — ENCOUNTER SYMPTOMS
RESPIRATORY NEGATIVE: 1
GASTROINTESTINAL NEGATIVE: 1
CARDIOVASCULAR NEGATIVE: 1
CONSTITUTIONAL NEGATIVE: 1
PSYCHIATRIC NEGATIVE: 1

## 2024-05-02 ASSESSMENT — PAIN SCALES - GENERAL
PAINLEVEL_OUTOF10: 0 - NO PAIN
PAIN_LEVEL: 0

## 2024-05-02 ASSESSMENT — COLUMBIA-SUICIDE SEVERITY RATING SCALE - C-SSRS
6. HAVE YOU EVER DONE ANYTHING, STARTED TO DO ANYTHING, OR PREPARED TO DO ANYTHING TO END YOUR LIFE?: NO
1. IN THE PAST MONTH, HAVE YOU WISHED YOU WERE DEAD OR WISHED YOU COULD GO TO SLEEP AND NOT WAKE UP?: NO
2. HAVE YOU ACTUALLY HAD ANY THOUGHTS OF KILLING YOURSELF?: NO

## 2024-05-02 ASSESSMENT — PAIN - FUNCTIONAL ASSESSMENT
PAIN_FUNCTIONAL_ASSESSMENT: 0-10

## 2024-05-02 NOTE — ANESTHESIA POSTPROCEDURE EVALUATION
Patient: Noy Ochoa    Procedure Summary       Date: 05/02/24 Room / Location: [facility] 03 / [facility] [place]    Anesthesia Start: 1250 Anesthesia Stop: 1319    Procedure: Phacoemulsification Cataract with Insertion Intraocular Lens (Left: Eye) Diagnosis:       Combined form of age-related cataract, left eye      (Combined form of age-related cataract, left eye [H25.812])    Surgeons: Michelle Byrnes MD Responsible Provider: DAVID Ma    Anesthesia Type: MAC ASA Status: 3            Anesthesia Type: MAC    Vitals Value Taken Time   /77 05/02/24 1319   Temp  05/02/24 1319   Pulse 81 05/02/24 1319   Resp 12 05/02/24 1319   SpO2 94 05/02/24 1319       Anesthesia Post Evaluation    Patient location during evaluation: bedside  Patient participation: complete - patient participated  Level of consciousness: awake and awake and alert  Pain score: 0  Pain management: adequate  Airway patency: patent  Cardiovascular status: acceptable  Respiratory status: acceptable  Hydration status: acceptable  Postoperative Nausea and Vomiting: none        No notable events documented.

## 2024-05-02 NOTE — DISCHARGE INSTRUCTIONS
Surgeon: Michelle Byrnes MD     Patient name: Noy Ochoa    Date of surgery: May 2, 2024        DROP INSTRUCTIONS:    Prednisolone acetate 1% (pink or white cap) - One drop 4 times a day to operative eye    Ofloxacin (tan cap) - One drop 4 times a day to operative eye    Ketorolac (gray cap) - One drop 4 times a day to operative eye    When putting different drops in around the same administration time, please wait 1-2 minutes between drops  Avoid sleeping on side of operative eye  No heavy lifting over 10-15lbs and no bending over  Do not get eye wet, no eye rubbing  Wear sunglasses or glasses during the day and the shield when sleeping at night  Please call immediately if you develop any redness, pain, decreased vision, flashes, or floaters      During office hours (8:30a-4:30p): [phone]  After office hours: [phone])  Hospital : [phone]   Pager: [phone] for Dr. Last

## 2024-05-02 NOTE — OP NOTE
Phacoemulsification Cataract with Insertion Intraocular Lens (L) Operative Note     Date: 2024  OR Location: [facility] OR    Name: Noy Ochoa, : 1940, Age: 83 y.o., MRN: [id number], Sex: female    Diagnosis  Pre-op Diagnosis     * Combined form of age-related cataract, left eye [H25.812] Post-op Diagnosis     * Combined form of age-related cataract, left eye [H25.812]     Procedures  Phacoemulsification Cataract with Insertion Intraocular Lens  96781 - TX XCAPSL CTRC RMVL INSJ IO LENS PROSTH CPLX WO ECP      Surgeons      * Michelle Byrnes - Primary    Resident/Fellow/Other Assistant:  Surgeons and Role:  * No surgeons found with a matching role *    Procedure Summary  Anesthesia: Monitor Anesthesia Care  ASA: III  Anesthesia Staff: CRNA: TIFFANY Ma-CRNA  Estimated Blood Loss: 0 mL  Intra-op Medications:   Administrations occurring from 1300 to 1350 on 24:   Medication Name Total Dose   balanced salts (BSS) intraocular solution 15 mL   povidone-iodine 5 % ophthalmic solution 1 Application   tetracaine (PF) 0.5 % ophthalmic solution 2 drop   chondroitin sulf-sod hyaluron (Duovisc) intraocular kit 0.5 mL   balanced salts (BSS) intraocular solution 1,000 mL              Anesthesia Record               Intraprocedure I/O Totals          Intake    Dexmedetomidine 0.00 mL    The total shown is the total volume documented since Anesthesia Start was filed.    Total Intake 0 mL          Specimen: No specimens collected     Staff:   Circulator: Jeanine Lima RN  Scrub Person: Nita Holt         Drains and/or Catheters: * None in log *    Tourniquet Times:         Implants:  Implants       Type Name Action Serial No.      Lens IMPLANT RECORD Implanted [id number]              Findings: as below    Indications: Noy Ochoa is an 83 y.o. female who is having surgery for Combined form of age-related cataract, left eye [H25.812].     Procedure Details: Patient name: Noy Ochoa    YOB: 1940   MRN: [id number]   Date of surgery: May 2, 2024  Preoperative diagnosis: Combined cataract of the LEFT eye  Postoperative diagnosis: Combined cataract of the LEFT eye  Procedure: Phacoemulsification of cataract with insertion of intraocular lens, LEFT eye   Surgeon: Michelle Byrnes MD  Resident: Rhiannon Loera MD  Anesthesia: MAC  Complications: None  Lens Inserted: Efren ACU0T0 with a power of +23.50 D. Serial #: [id number]. Exp date: 04/02/2025.    Procedure description: After the risks, benefits, and alternatives of the planned procedure were discussed with the patient, informed consent was obtained at the preoperative evaluation. On the day of surgery, there were no updates to the consent form and any patient questions were answered. The patient was correctly identified in the preoperative area and the LEFT eye was marked as the operative eye. Dilating drops were instilled into the LEFT eye in the preoperative area. The patient was then taken back to the operating room and placed under sedation. The patient was prepped and draped in the standard, sterile ophthalmic fashion in preparation for intraocular surgery.    A lid speculum was placed into the LEFT palpebral fissure and the operating microscope was brought into position. A paracentesis was made in inferotemporal cornea at the limbus with a 1.0mm side port blade using a cotton tipped applicator to provide countertraction. Viscoat viscoelastic was injected into the anterior chamber. Using 0.12 forceps to stabilize the globe, a 2.4mm keratome blade was used to create a limbal clear-corneal incision superotemporally. A bent-needle cystotome and Utrata forceps were used to create a continuous curvilinear capsulorrhexis. Balanced salt saline solution on a blunt-tipped cannula was used to achieve hydrodissection.    A phacoemulsification device and a Ivy spatula were used to remove the nucleus using a divide-and-conquer  technique. Residual cortical material was removed with the irrigation and aspiration handpiece. Provisc viscoelastic was then injected into the eye to reform the anterior chamber and to open the capsular bag. The posterior capsule was inspected and found to be clean and intact. The intraocular lens, Efren ACU0T0 with a power of +23.50 diopters was injected into the capsular bag. A lens positioner was used to center the lens and ensure good position within the bag. The remaining viscoelastic was removed using irrigation and aspiration. Balanced salt saline solution on a blunt-tipped cannula was then used to hydrate the corneal stroma adjacent to the main wound and paracentesis site as well as to reform the anterior chamber. The wound was checked and found to be watertight with normal intraocular pressure verified using digital palpation. At the conclusion of the case, a well-centered intraocular lens with a good red reflex was observed. Tetracaine, betadine, BSS, and prednisolone acetate drops were instilled into the LEFT eye. The lid speculum and drapes were removed. A clear plastic shield was then taped over the eye. The patient was taken to the recovery room in stable condition, having tolerated the procedure well. There were no complications.      Complications:  None; patient tolerated the procedure well.    Disposition: PACU - hemodynamically stable.  Condition: stable         Additional Details: none    Attending Attestation: I performed the procedure.    Michelle Byrnes  Phone Number: [phone]

## 2024-05-02 NOTE — ANESTHESIA PREPROCEDURE EVALUATION
Patient: Noy Ochoa    Procedure Information       Date/Time: 05/02/24 1300    Procedure: Phacoemulsification Cataract with Insertion Intraocular Lens (Left: Eye)    Location: [facility] [place] [facility] 03 / [facility]    Surgeons: Michelle Byrnes MD            Relevant Problems   Anesthesia (within normal limits)  Sensitive to anesthesia      Cardiac   (+) Abnormal EKG   (+) Benign essential hypertension   (+) Hypercholesterolemia   (+) LBBB (left bundle branch block)      Pulmonary   (+) Multifocal pneumonia      Neuro   (+) Depression   (+) Major depressive disorder, recurrent, unspecified (CMS-HCC)   (+) Mild vascular dementia without behavioral disturbance, psychotic disturbance, mood disturbance, or anxiety (Multi)      GI (within normal limits)      /Renal (within normal limits)      Liver (within normal limits)      Endocrine (within normal limits)      Hematology (within normal limits)      Musculoskeletal   (+) Primary osteoarthritis of left knee      ID   (+) COVID-19       Clinical information reviewed:   Tobacco  Allergies  Meds   Med Hx  Surg Hx   Fam Hx  Soc Hx        NPO Detail:  NPO/Void Status  Date of Last Liquid: 05/02/24  Time of Last Liquid: 1000  Date of Last Solid: 05/01/24  Time of Last Solid: 2200         Physical Exam    Airway  Mallampati: III  TM distance: >3 FB  Neck ROM: full     Cardiovascular    Dental - normal exam     Pulmonary    Abdominal        Anesthesia Plan    History of general anesthesia?: yes  History of complications of general anesthesia?: no    ASA 3     MAC     intravenous induction   Anesthetic plan and risks discussed with patient.    Plan discussed with CRNA.

## 2024-05-02 NOTE — H&P
History Of Present Illness  Noy Ochoa is a 83 y.o. female presenting with left eye cataract. Here for left eye cataract extraction and intraocular lens placement.     Past Medical History  Past Medical History:   Diagnosis Date    Anxiety     Combined forms of age-related cataract, left eye 09/06/2022    Combined form of age-related cataract, left eye    Combined forms of age-related cataract, right eye 09/06/2022    Combined form of age-related cataract, right eye    Depression, unspecified 02/23/2022    Depression    Hyperlipidemia     Macular drusen, bilateral     OAB (overactive bladder)     Urinary tract infection        Surgical History  Past Surgical History:   Procedure Laterality Date    CHOLECYSTECTOMY  10/11/2013    Cholecystectomy    HYSTERECTOMY  10/11/2013    Hysterectomy    TONSILLECTOMY  10/11/2013    Tonsillectomy        Social History  She reports that she has never smoked. She has never been exposed to tobacco smoke. She has never used smokeless tobacco. She reports that she does not currently use alcohol. She reports that she does not use drugs.    Family History  Family History   Problem Relation Name Age of Onset    Colon cancer Mother      Diabetes Mother      Hypertension Mother      Krystal-Pick disease Father      Heart attack Brother      Diabetes Brother      Stroke Brother          Allergies  Aspirin, Food extracts, Violet, Latex, and Lidocaine    Review of Systems   Constitutional: Negative.    Respiratory: Negative.     Cardiovascular: Negative.    Gastrointestinal: Negative.    Psychiatric/Behavioral: Negative.          Physical Exam  Constitutional:       Appearance: Normal appearance.   HENT:      Head: Normocephalic and atraumatic.   Cardiovascular:      Rate and Rhythm: Regular rhythm.   Pulmonary:      Effort: Pulmonary effort is normal.   Abdominal:      General: Abdomen is flat.      Palpations: Abdomen is soft.   Neurological:      Mental Status: She is alert.           Last Recorded Vitals  Weight 76.7 kg (169 lb 1.5 oz).    Relevant Results        left eye cataract. Here for left eye cataract extraction and intraocular lens placement.     Assessment/Plan   Principal Problem:    Combined form of age-related cataract, left eye      left eye cataract. Here for left eye cataract extraction and intraocular lens placement.           Rhiannon Loera MD

## 2024-05-03 ENCOUNTER — OFFICE VISIT (OUTPATIENT)
Dept: OPHTHALMOLOGY | Facility: CLINIC | Age: 84
End: 2024-05-03
Payer: MEDICARE

## 2024-05-03 DIAGNOSIS — H52.4 PRESBYOPIA: ICD-10-CM

## 2024-05-03 DIAGNOSIS — H52.03 HYPEROPIA, BILATERAL: ICD-10-CM

## 2024-05-03 DIAGNOSIS — H35.363 MACULAR DRUSEN, BILATERAL: ICD-10-CM

## 2024-05-03 DIAGNOSIS — H25.811 COMBINED FORM OF AGE-RELATED CATARACT, RIGHT EYE: ICD-10-CM

## 2024-05-03 DIAGNOSIS — Z96.1 PSEUDOPHAKIA: Primary | ICD-10-CM

## 2024-05-03 DIAGNOSIS — H52.203 ASTIGMATISM OF BOTH EYES, UNSPECIFIED TYPE: ICD-10-CM

## 2024-05-03 PROCEDURE — 99024 POSTOP FOLLOW-UP VISIT: CPT | Performed by: OPHTHALMOLOGY

## 2024-05-03 ASSESSMENT — SLIT LAMP EXAM - LIDS
COMMENTS: GOOD POSITION, DERMATOCHALASIS
COMMENTS: GOOD POSITION, DERMATOCHALASIS

## 2024-05-03 ASSESSMENT — VISUAL ACUITY
OS_SC: 20/40
METHOD: SNELLEN - LINEAR

## 2024-05-03 ASSESSMENT — ENCOUNTER SYMPTOMS
EYES NEGATIVE: 1
PSYCHIATRIC NEGATIVE: 0
HEMATOLOGIC/LYMPHATIC NEGATIVE: 0
RESPIRATORY NEGATIVE: 0
ENDOCRINE NEGATIVE: 0
MUSCULOSKELETAL NEGATIVE: 0
GASTROINTESTINAL NEGATIVE: 0
NEUROLOGICAL NEGATIVE: 0
ALLERGIC/IMMUNOLOGIC NEGATIVE: 0
CONSTITUTIONAL NEGATIVE: 0
CARDIOVASCULAR NEGATIVE: 0

## 2024-05-03 ASSESSMENT — EXTERNAL EXAM - RIGHT EYE: OD_EXAM: NORMAL

## 2024-05-03 ASSESSMENT — TONOMETRY
IOP_METHOD: GOLDMANN APPLANATION
OS_IOP_MMHG: 17

## 2024-05-03 ASSESSMENT — EXTERNAL EXAM - LEFT EYE: OS_EXAM: NORMAL

## 2024-05-03 ASSESSMENT — PAIN SCALES - GENERAL: PAINLEVEL_OUTOF10: 0 - NO PAIN

## 2024-05-03 NOTE — PATIENT INSTRUCTIONS
Surgeon: Michelle Byrnes MD      Patient name: Noy Ochoa    Date of visit: May 3, 2024      left eye    Prednisolone acetate (pink or white cap) - 4 times a day    Ofloxacin (tan cap) - 4 times a day    Ketorolac (gray cap) - 4 times a day      No heavy lifting over 10-15 lbs, no bending over, do not get eye wet, no eye rubbing. Wear eye shield at bedtime and sunglasses/glasses during the day. Please call immediately if you develop any redness, pain, decreased vision, flashes, floaters.       Surgical Scheduler (during office hours): [place]: [phone]  Office phone (after hours): [phone] [facility] : [phone]                     Pager: [phone] for Dr. Last

## 2024-05-03 NOTE — PROGRESS NOTES
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 Good vision. IOP good.  Prednisolone acetate 1% - 4 times a day  Ofloxacin - 4 times a day  Ketorolac - 4 times a day  No heavy lifting over 10-15 lbs, no bending over, do not get eye wet, no eye rubbing. Wear eye shield at bedtime and sunglasses/glasses during the day. Advised to call if any redness, pain, decreased vision, flashes, floaters. F/u 1 week - refract OU (autorefract first), glare/BAT right eye. Plan for dilation OU if cataract right eye is visually significant and if patient would like to proceed with cataract surgery.     Combined form of age-related cataract, right eyeH25.811  -Tentatively scheduled for 5/30/24  -Will discuss option of cataract surgery right eye at next visit. F/u 1 week - refract OU (autorefract first), glare/BAT right eye. Plan for dilation OU if cataract right eye is visually significant and if patient would like to proceed with cataract surgery.     Macular jniiaeV22.369  -OCT macula (8/15/23) - Normal thickness and contour OU.  Intact IS-OS OU.  No edema OU.  Few scattered drusen OU. 248/244.  Stable from 12/7/2021.  -Not visually significant at this time. Monitor.     TbmealbmrR70.00  KpvvoeeaffhK48.209  UrbbdvtmvtJ23.4  -Patient lost current glasses. Advised caution with driving at night.  -F/u 1 week - refract OU (autorefract first), glare/BAT right eye. Plan for dilation OU if cataract right eye is visually significant and if patient would like to proceed with cataract surgery.       No history of refractive surgery.   No FH of AMD/glaucoma      Attending Attestation Statement for Evaluation and Management Services:    I was physically present and/or personally examined the patient during the evaluation of this patient. I reviewed the documentation and confirm the resident's note.

## 2024-05-09 ENCOUNTER — OFFICE VISIT (OUTPATIENT)
Dept: PRIMARY CARE | Facility: CLINIC | Age: 84
End: 2024-05-09
Payer: MEDICARE

## 2024-05-09 VITALS — SYSTOLIC BLOOD PRESSURE: 133 MMHG | DIASTOLIC BLOOD PRESSURE: 74 MMHG

## 2024-05-09 DIAGNOSIS — F01.A0 MILD VASCULAR DEMENTIA WITHOUT BEHAVIORAL DISTURBANCE, PSYCHOTIC DISTURBANCE, MOOD DISTURBANCE, OR ANXIETY (MULTI): ICD-10-CM

## 2024-05-09 DIAGNOSIS — N39.41 URGE INCONTINENCE OF URINE: ICD-10-CM

## 2024-05-09 DIAGNOSIS — F32.A DEPRESSION, UNSPECIFIED DEPRESSION TYPE: Primary | ICD-10-CM

## 2024-05-09 DIAGNOSIS — I10 BENIGN ESSENTIAL HYPERTENSION: ICD-10-CM

## 2024-05-09 DIAGNOSIS — E78.2 MIXED HYPERLIPIDEMIA: ICD-10-CM

## 2024-05-09 PROCEDURE — 1160F RVW MEDS BY RX/DR IN RCRD: CPT | Performed by: INTERNAL MEDICINE

## 2024-05-09 PROCEDURE — 3075F SYST BP GE 130 - 139MM HG: CPT | Performed by: INTERNAL MEDICINE

## 2024-05-09 PROCEDURE — 3078F DIAST BP <80 MM HG: CPT | Performed by: INTERNAL MEDICINE

## 2024-05-09 PROCEDURE — 1159F MED LIST DOCD IN RCRD: CPT | Performed by: INTERNAL MEDICINE

## 2024-05-09 PROCEDURE — 1157F ADVNC CARE PLAN IN RCRD: CPT | Performed by: INTERNAL MEDICINE

## 2024-05-09 PROCEDURE — 99214 OFFICE O/P EST MOD 30 MIN: CPT | Performed by: INTERNAL MEDICINE

## 2024-05-09 ASSESSMENT — CUP TO DISC RATIO
OD_RATIO: .2
OS_RATIO: .2

## 2024-05-09 ASSESSMENT — SLIT LAMP EXAM - LIDS
COMMENTS: GOOD POSITION, DERMATOCHALASIS
COMMENTS: GOOD POSITION, DERMATOCHALASIS

## 2024-05-09 ASSESSMENT — EXTERNAL EXAM - RIGHT EYE: OD_EXAM: NORMAL

## 2024-05-09 ASSESSMENT — EXTERNAL EXAM - LEFT EYE: OS_EXAM: NORMAL

## 2024-05-09 NOTE — PROGRESS NOTES
Subjective   Patient ID: Noy Ochoa is a 83 y.o. female who presents for No chief complaint on file..    HPI follow-up visit came with son interested in doing more about her urinary incontinence would be interested in other modalities such as electrostimulation or Botox if applicable to her no chest pain no shortness of breath no side effect with medication bowels normal has had no falls may have been on Fosamax may have been off of that now for some time would like to update her psychiatry medications needs new psychiatrist    Review of Systems    Objective   There were no vitals taken for this visit.    Physical Exam vital signs noted alert and oriented x 3 NCAT slowed speech no JVD or bruit chest clear to auscultation and percussion CV regular rate and rhythm S1-S2 without murmur gallop or rub abdomen soft nontender normal active bowel sounds LS spine normal curvature walking with cane negative straight leg raise negative logroll negative SI joint tenderness extremities no clubbing cyanosis or edema normal distal pulses DTR 2+    Assessment/Plan    impression hypertension hyperlipidemia depression diagnosis urinary incontinence other diagnoses (cognitive)  Plan review referred to psychiatry Dr. Mccall (check)  Rerefer to urogynecology Dr. Carmona or other (check)  Discussed with medication for urinary continence and recent urology visit  Good diet regular exercise increase water consumption  Calcium and vitamin D review prior Fosamax prescription and represcribe as needed when needed  Update blood pressure and cholesterol medicines reviewed prior lipid levels  D/w cognition and continued care for self with others help d/w depression and cognition with patient/family  D/w blood sugars and diet and water consumption  Recheck 3 months Endor blood work  tt40 cc21    When was Medicare annual visit (July)  See prior visit (check)

## 2024-05-10 ENCOUNTER — OFFICE VISIT (OUTPATIENT)
Dept: OPHTHALMOLOGY | Facility: CLINIC | Age: 84
End: 2024-05-10
Payer: MEDICARE

## 2024-05-10 DIAGNOSIS — H52.03 HYPEROPIA, BILATERAL: ICD-10-CM

## 2024-05-10 DIAGNOSIS — H35.363 MACULAR DRUSEN, BILATERAL: ICD-10-CM

## 2024-05-10 DIAGNOSIS — H52.203 ASTIGMATISM OF BOTH EYES, UNSPECIFIED TYPE: ICD-10-CM

## 2024-05-10 DIAGNOSIS — Z96.1 PSEUDOPHAKIA: ICD-10-CM

## 2024-05-10 DIAGNOSIS — H52.4 PRESBYOPIA: ICD-10-CM

## 2024-05-10 DIAGNOSIS — H25.811 COMBINED FORM OF AGE-RELATED CATARACT, RIGHT EYE: Primary | ICD-10-CM

## 2024-05-10 PROCEDURE — 99024 POSTOP FOLLOW-UP VISIT: CPT | Performed by: OPHTHALMOLOGY

## 2024-05-10 RX ORDER — CYCLOPENTOLATE HYDROCHLORIDE 10 MG/ML
1 SOLUTION/ DROPS OPHTHALMIC
Status: CANCELLED | OUTPATIENT
Start: 2024-05-10 | End: 2024-05-10

## 2024-05-10 RX ORDER — TETRACAINE HYDROCHLORIDE 5 MG/ML
1 SOLUTION OPHTHALMIC ONCE
Status: CANCELLED | OUTPATIENT
Start: 2024-05-10 | End: 2024-05-10

## 2024-05-10 RX ORDER — PHENYLEPHRINE HYDROCHLORIDE 100 MG/ML
1 SOLUTION/ DROPS OPHTHALMIC
Status: CANCELLED | OUTPATIENT
Start: 2024-05-10 | End: 2024-05-10

## 2024-05-10 ASSESSMENT — ENCOUNTER SYMPTOMS
EYES NEGATIVE: 1
CARDIOVASCULAR NEGATIVE: 0
HEMATOLOGIC/LYMPHATIC NEGATIVE: 0
ENDOCRINE NEGATIVE: 0
PSYCHIATRIC NEGATIVE: 0
MUSCULOSKELETAL NEGATIVE: 0
GASTROINTESTINAL NEGATIVE: 0
RESPIRATORY NEGATIVE: 0
ALLERGIC/IMMUNOLOGIC NEGATIVE: 0
NEUROLOGICAL NEGATIVE: 0
CONSTITUTIONAL NEGATIVE: 0

## 2024-05-10 ASSESSMENT — REFRACTION_MANIFEST
OD_SPHERE: +2.25
OS_AXIS: 083
OD_AXIS: 080
OS_SPHERE: +0.25
METHOD_AUTOREFRACTION: 1
OS_AXIS: 085
OS_CYLINDER: -1.00
OD_SPHERE: +1.75
OD_CYLINDER: -0.75
OD_CYLINDER: -0.75
OD_AXIS: 082
OS_CYLINDER: -1.00
OS_SPHERE: PLANO
OS_ADD: +3.00
OD_ADD: +3.00

## 2024-05-10 ASSESSMENT — VISUAL ACUITY
METHOD: SNELLEN - LINEAR
OS_SC+: -2
OD_CC: 20/40
OS_SC: 20/30
OD_BAT_MED: 20/50
CORRECTION_TYPE: GLASSES
OD_CC+: -1

## 2024-05-10 ASSESSMENT — TONOMETRY
OD_IOP_MMHG: 16
OS_IOP_MMHG: 17
IOP_METHOD: GOLDMANN APPLANATION

## 2024-05-10 NOTE — PATIENT INSTRUCTIONS
Surgeon: Michelle Byrnes MD                   [phone]      Patient name: Noy Ochoa    Date of visit: 5/10/24      left eye    Prednisolone acetate (pink or white cap) - 4 times a day for 1 week, 3 times a day for 1 week, 2 times a day for 1 week, once a day for 1 week, then stop    Ofloxacin (tan cap) - 4 times a day for 5 more days, then stop    Ketorolac (gray cap) - 4 times a day for 5 more days, then stop      No heavy lifting over 15-20 lbs, try not to get eye wet, no eye rubbing. You may stop wearing the eye shield at night. Please continue wearing glasses or sunglasses during the day to protect your eyes. Please call immediately if you develop any redness, pain, decreased vision, flashes, floaters.     Surgical Scheduler (during office hours) - [place]: [phone]

## 2024-05-10 NOTE — PROGRESS NOTES
Combined form of age-related cataract, right eyeH25.811  -Visually significant cataract right eye. BCVA: 20/30-. Glare: 20/50(M). Symptoms: Gradual decrease in vision x years. Difficulty reading small print and seeing words/numbers on TV. A change in glasses prescription will not result in significant visual improvement at this time.  Indication/anticipated outcome for cataract surgery: To potentially improve visual acuity and improve quality of life/reduce symptoms. To obtain a better view of the retina/optic nerve. To reduce anisometropia).  Based on a comprehensive eye exam performed 5/10/24, a visually significant cataract appears to be the source of decreased vision, diminished quality of life, and impairment of activities of daily living. Discussed option of cataract surgery vs observation. Patient can no longer function adequately with current best corrected visual acuity and wishes to have cataract surgery at this time - she would like to optimize vision prior to getting vision tested to renew license at [facility]. Discussed surgical procedure with patient. Discussed potential risks, benefits, and complications of cataract surgery including but not limited to pain, bleeding, infection, inflammation, edema, increased eye pressure, retinal tear/detachment, lens dislocation, ptosis, iris damage, need for additional surgery, need for glasses after surgery, loss of vision/loss of eye. Patient understands and wishes to proceed. All questions were answered. Will schedule cataract surgery right eye. Lenstar done 6/11/19, 3/9/21,9/6/22, and 8/15/23.  -Pentacam (3/9/21) - OD: Irregular. 43.3/44.0 @ 102.5. OS: Centrally regular. 43.5/44.0 @ 102.9.   -Pentacam (9/6/22) - OD: Regular oblique.  43.5/43.6@11.9.  OS: Irregular.  43.2/23.7@159.2.  -Pentacam (8/15/23) - OD: Irregular. 43.5/44.0 @ 126.0. OS: Irregular. 43.4/43.6 @ 115.5.   Discussed IOL options (standard monofocal, monofocal with monovision, toric, multifocal).  Lens chosen: Standard monofocal. Defer/decline toric/multifocal lens at this time.  Aim: [place] to -0.50. Had thorough discussion with patient re: aim. Discussed that may potentially need glasses for best vision both at distance and at near.   Dominance: right eye  Special considerations: Will plan to use trypan blue due to dense cataract and poor red reflex. May use possible Malyugin Ring/iris hooks if poor dilation on day of surgery. NO lidocaine due to allergy. No R.   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 Good vision. IOP good.  Prednisolone acetate 1% - 4/3/2/1 weekly taper  Ofloxacin - 4 times a day x 5 more days, then stop  Ketorolac - 4 times a day x 5 more days, then stop  No heavy lifting over 15-20 lbs, do not get eye wet, no eye rubbing. Discontinue eye shield at bedtime but wear sunglasses/glasses during the day. Advised to call if any redness, pain, decreased vision, flashes, floaters.     Macular xxpwdiX09.369  -OCT macula (8/15/23) - Normal thickness and contour OU.  Intact IS-OS OU.  No edema OU.  Few scattered drusen OU. 248/244.  Stable from 12/7/2021.  -Not visually significant at this time. Monitor.     BiecmvaktS42.00  NrievxpzrgnP02.209  PnbbsfgmrsO61.4  -Patient lost current glasses. Advised caution with driving at night.  -Defer new Rx. No significant improvement in vision with refraction at this time.       No history of refractive surgery.   No FH of AMD/glaucoma

## 2024-05-29 ENCOUNTER — ANESTHESIA EVENT (OUTPATIENT)
Dept: OPERATING ROOM | Facility: CLINIC | Age: 84
End: 2024-05-29
Payer: MEDICARE

## 2024-05-30 ENCOUNTER — HOSPITAL ENCOUNTER (OUTPATIENT)
Facility: CLINIC | Age: 84
Setting detail: OUTPATIENT SURGERY
Discharge: HOME | End: 2024-05-30
Attending: OPHTHALMOLOGY | Admitting: OPHTHALMOLOGY
Payer: MEDICARE

## 2024-05-30 ENCOUNTER — ANESTHESIA (OUTPATIENT)
Dept: OPERATING ROOM | Facility: CLINIC | Age: 84
End: 2024-05-30
Payer: MEDICARE

## 2024-05-30 VITALS
RESPIRATION RATE: 16 BRPM | SYSTOLIC BLOOD PRESSURE: 151 MMHG | WEIGHT: 170.86 LBS | DIASTOLIC BLOOD PRESSURE: 68 MMHG | TEMPERATURE: 97.5 F | HEIGHT: 63 IN | BODY MASS INDEX: 30.27 KG/M2 | OXYGEN SATURATION: 95 % | HEART RATE: 84 BPM

## 2024-05-30 DIAGNOSIS — H25.811 COMBINED FORM OF AGE-RELATED CATARACT, RIGHT EYE: Primary | ICD-10-CM

## 2024-05-30 PROBLEM — F41.9 ANXIETY: Status: ACTIVE | Noted: 2024-05-30

## 2024-05-30 PROBLEM — T88.59XA DELAYED EMERGENCE FROM ANESTHESIA: Status: ACTIVE | Noted: 2024-05-30

## 2024-05-30 PROCEDURE — 2500000001 HC RX 250 WO HCPCS SELF ADMINISTERED DRUGS (ALT 637 FOR MEDICARE OP): Performed by: OPHTHALMOLOGY

## 2024-05-30 PROCEDURE — 3700000001 HC GENERAL ANESTHESIA TIME - INITIAL BASE CHARGE: Performed by: OPHTHALMOLOGY

## 2024-05-30 PROCEDURE — 7100000009 HC PHASE TWO TIME - INITIAL BASE CHARGE: Performed by: OPHTHALMOLOGY

## 2024-05-30 PROCEDURE — 7100000010 HC PHASE TWO TIME - EACH INCREMENTAL 1 MINUTE: Performed by: OPHTHALMOLOGY

## 2024-05-30 PROCEDURE — 3700000002 HC GENERAL ANESTHESIA TIME - EACH INCREMENTAL 1 MINUTE: Performed by: OPHTHALMOLOGY

## 2024-05-30 PROCEDURE — 66984 XCAPSL CTRC RMVL W/O ECP: CPT | Performed by: OPHTHALMOLOGY

## 2024-05-30 PROCEDURE — 2500000005 HC RX 250 GENERAL PHARMACY W/O HCPCS: Performed by: OPHTHALMOLOGY

## 2024-05-30 PROCEDURE — 2500000004 HC RX 250 GENERAL PHARMACY W/ HCPCS (ALT 636 FOR OP/ED): Performed by: NURSE ANESTHETIST, CERTIFIED REGISTERED

## 2024-05-30 PROCEDURE — 2500000004 HC RX 250 GENERAL PHARMACY W/ HCPCS (ALT 636 FOR OP/ED): Performed by: OPHTHALMOLOGY

## 2024-05-30 PROCEDURE — 3600000003 HC OR TIME - INITIAL BASE CHARGE - PROCEDURE LEVEL THREE: Performed by: OPHTHALMOLOGY

## 2024-05-30 PROCEDURE — C1780 LENS, INTRAOCULAR (NEW TECH): HCPCS | Performed by: OPHTHALMOLOGY

## 2024-05-30 PROCEDURE — 3600000008 HC OR TIME - EACH INCREMENTAL 1 MINUTE - PROCEDURE LEVEL THREE: Performed by: OPHTHALMOLOGY

## 2024-05-30 DEVICE — STERILE UV AND BLUE LIGHT FILTERING ACRYLIC FOLDABLE SINGLE-PIECE POSTERIOR CHAMBER LENSES WITH THE ULTRASERT® PRE-LOADED DELIVERY SYSTEM
Type: IMPLANTABLE DEVICE | Site: EYE | Status: FUNCTIONAL
Brand: ACRYSOF® ULTRASERT®

## 2024-05-30 RX ORDER — PHENYLEPHRINE HYDROCHLORIDE 100 MG/ML
1 SOLUTION/ DROPS OPHTHALMIC
Status: COMPLETED | OUTPATIENT
Start: 2024-05-30 | End: 2024-05-30

## 2024-05-30 RX ORDER — TETRACAINE HYDROCHLORIDE 5 MG/ML
1 SOLUTION OPHTHALMIC ONCE
Status: COMPLETED | OUTPATIENT
Start: 2024-05-30 | End: 2024-05-30

## 2024-05-30 RX ORDER — POVIDONE-IODINE 5 %
SOLUTION, NON-ORAL OPHTHALMIC (EYE) AS NEEDED
Status: DISCONTINUED | OUTPATIENT
Start: 2024-05-30 | End: 2024-05-30 | Stop reason: HOSPADM

## 2024-05-30 RX ORDER — TETRACAINE HYDROCHLORIDE 5 MG/ML
SOLUTION OPHTHALMIC AS NEEDED
Status: DISCONTINUED | OUTPATIENT
Start: 2024-05-30 | End: 2024-05-30 | Stop reason: HOSPADM

## 2024-05-30 RX ORDER — CYCLOPENTOLATE HYDROCHLORIDE 10 MG/ML
1 SOLUTION/ DROPS OPHTHALMIC
Status: COMPLETED | OUTPATIENT
Start: 2024-05-30 | End: 2024-05-30

## 2024-05-30 RX ORDER — SODIUM CHLORIDE, SODIUM LACTATE, POTASSIUM CHLORIDE, CALCIUM CHLORIDE 600; 310; 30; 20 MG/100ML; MG/100ML; MG/100ML; MG/100ML
100 INJECTION, SOLUTION INTRAVENOUS CONTINUOUS
Status: DISCONTINUED | OUTPATIENT
Start: 2024-05-30 | End: 2024-05-30 | Stop reason: HOSPADM

## 2024-05-30 RX ORDER — EPINEPHRINE 1 MG/ML
INJECTION, SOLUTION, CONCENTRATE INTRAVENOUS AS NEEDED
Status: DISCONTINUED | OUTPATIENT
Start: 2024-05-30 | End: 2024-05-30 | Stop reason: HOSPADM

## 2024-05-30 RX ORDER — MIDAZOLAM HYDROCHLORIDE 1 MG/ML
INJECTION, SOLUTION INTRAMUSCULAR; INTRAVENOUS AS NEEDED
Status: DISCONTINUED | OUTPATIENT
Start: 2024-05-30 | End: 2024-05-30

## 2024-05-30 RX ADMIN — PHENYLEPHRINE HYDROCHLORIDE 1 DROP: 100 SOLUTION/ DROPS OPHTHALMIC at 13:45

## 2024-05-30 RX ADMIN — TETRACAINE HYDROCHLORIDE 1 DROP: 5 SOLUTION OPHTHALMIC at 13:35

## 2024-05-30 RX ADMIN — PHENYLEPHRINE HYDROCHLORIDE 1 DROP: 100 SOLUTION/ DROPS OPHTHALMIC at 13:40

## 2024-05-30 RX ADMIN — PHENYLEPHRINE HYDROCHLORIDE 1 DROP: 100 SOLUTION/ DROPS OPHTHALMIC at 13:35

## 2024-05-30 RX ADMIN — CYCLOPENTOLATE HYDROCHLORIDE 1 DROP: 10 SOLUTION/ DROPS OPHTHALMIC at 13:40

## 2024-05-30 RX ADMIN — CYCLOPENTOLATE HYDROCHLORIDE 1 DROP: 10 SOLUTION/ DROPS OPHTHALMIC at 13:45

## 2024-05-30 RX ADMIN — MIDAZOLAM 1 MG: 1 INJECTION INTRAMUSCULAR; INTRAVENOUS at 14:52

## 2024-05-30 RX ADMIN — CYCLOPENTOLATE HYDROCHLORIDE 1 DROP: 10 SOLUTION/ DROPS OPHTHALMIC at 13:35

## 2024-05-30 RX ADMIN — MIDAZOLAM 1 MG: 1 INJECTION INTRAMUSCULAR; INTRAVENOUS at 14:55

## 2024-05-30 ASSESSMENT — ENCOUNTER SYMPTOMS
CARDIOVASCULAR NEGATIVE: 1
GASTROINTESTINAL NEGATIVE: 1
PSYCHIATRIC NEGATIVE: 1
CONSTITUTIONAL NEGATIVE: 1
RESPIRATORY NEGATIVE: 1

## 2024-05-30 ASSESSMENT — PAIN SCALES - GENERAL
PAINLEVEL_OUTOF10: 0 - NO PAIN

## 2024-05-30 ASSESSMENT — PAIN - FUNCTIONAL ASSESSMENT
PAIN_FUNCTIONAL_ASSESSMENT: 0-10

## 2024-05-30 NOTE — H&P
History Of Present Illness  Noy Ochoa is a 83 y.o. female presenting with right eye cataract. Here for right eye cataract extraction and intraocular lens placement.     Past Medical History  Past Medical History:   Diagnosis Date    Anxiety     Combined forms of age-related cataract, left eye 09/06/2022    Combined form of age-related cataract, left eye    Combined forms of age-related cataract, right eye 09/06/2022    Combined form of age-related cataract, right eye    Depression, unspecified 02/23/2022    Depression    Hyperlipidemia     Hypertension     Macular drusen, bilateral     OAB (overactive bladder)     Urinary tract infection        Surgical History  Past Surgical History:   Procedure Laterality Date    CATARACT EXTRACTION Left 05/02/2024    Dr. Michelle Byrnes    CHOLECYSTECTOMY  10/11/2013    Cholecystectomy    HYSTERECTOMY  10/11/2013    Hysterectomy    TONSILLECTOMY  10/11/2013    Tonsillectomy        Social History  She reports that she has never smoked. She has never been exposed to tobacco smoke. She has never used smokeless tobacco. She reports that she does not currently use alcohol. She reports that she does not use drugs.    Family History  Family History   Problem Relation Name Age of Onset    Colon cancer Mother      Diabetes Mother      Hypertension Mother      Krystal-Pick disease Father      Heart attack Brother      Diabetes Brother      Stroke Brother          Allergies  Aspirin, Food extracts, Violet, Latex, and Lidocaine    Review of Systems   Constitutional: Negative.    Respiratory: Negative.     Cardiovascular: Negative.    Gastrointestinal: Negative.    Psychiatric/Behavioral: Negative.          Physical Exam  Constitutional:       Appearance: Normal appearance.   HENT:      Head: Normocephalic and atraumatic.   Cardiovascular:      Rate and Rhythm: Regular rhythm.   Pulmonary:      Effort: Pulmonary effort is normal.   Abdominal:      General: Abdomen is flat.       Palpations: Abdomen is soft.   Neurological:      Mental Status: She is alert.          Last Recorded Vitals  There were no vitals taken for this visit.    Relevant Results        right eye cataract. Here for right eye cataract extraction and intraocular lens placement.     Assessment/Plan   Principal Problem:    Combined form of age-related cataract, right eye      right eye cataract. Here for right eye cataract extraction and intraocular lens placement.           Rhiannon Loera MD

## 2024-05-30 NOTE — ANESTHESIA PREPROCEDURE EVALUATION
Patient: Noy Ochoa    Procedure Information       Date/Time: 05/30/24 1440    Procedure: Phacoemulsification Cataract with Insertion Intraocular Lens (Right: Eye)    Location: [facility] [place] [facility] OR 03 / [facility] [place] [facility] OR    Surgeons: Michelle Byrnes MD            Relevant Problems   Anesthesia   (+) Delayed emergence from anesthesia      Cardiac   (+) Abnormal EKG   (+) Benign essential hypertension   (+) Hypercholesterolemia   (+) LBBB (left bundle branch block)      Pulmonary   (+) Multifocal pneumonia      Neuro   (+) Anxiety   (+) Depression   (+) Major depressive disorder, recurrent, unspecified (CMS-HCC)   (+) Mild vascular dementia without behavioral disturbance, psychotic disturbance, mood disturbance, or anxiety (Multi)      /Renal (within normal limits)      Liver (within normal limits)      Endocrine (within normal limits)      Musculoskeletal   (+) Primary osteoarthritis of left knee      ID   (+) COVID-19       Clinical information reviewed:   Tobacco  Allergies  Meds   Med Hx  Surg Hx   Fam Hx  Soc Hx        NPO Detail:  NPO/Void Status  Date of Last Liquid: 05/30/24  Time of Last Liquid: 1000  Date of Last Solid: 05/29/24  Time of Last Solid: 1900  Last Intake Type: Clear fluids         Physical Exam    Airway  Mallampati: III  TM distance: >3 FB  Neck ROM: full     Cardiovascular    Dental - normal exam     Pulmonary    Abdominal        Anesthesia Plan    History of general anesthesia?: yes  History of complications of general anesthesia?: no    ASA 3     MAC     intravenous induction   Anesthetic plan and risks discussed with patient.    Plan discussed with CRNA.

## 2024-05-30 NOTE — DISCHARGE INSTRUCTIONS
Surgeon: Michelle Byrnes MD     Patient name: Noy Ochoa    Date of surgery: May 30, 2024        DROP INSTRUCTIONS:    Prednisolone acetate 1% (pink or white cap) - One drop 4 times a day to operative eye    Ofloxacin (tan cap) - One drop 4 times a day to operative eye    Ketorolac (gray cap) - One drop 4 times a day to operative eye    When putting different drops in around the same administration time, please wait 1-2 minutes between drops  Avoid sleeping on side of operative eye  No heavy lifting over 10-15lbs and no bending over  Do not get eye wet, no eye rubbing  Wear sunglasses or glasses during the day and the shield when sleeping at night  Please call immediately if you develop any redness, pain, decreased vision, flashes, or floaters      During office hours (8:30a-4:30p): [phone]  After office hours: [phone] ([phone])  Hospital : [phone]   Pager: [phone] for Dr. Last

## 2024-05-30 NOTE — OP NOTE
Phacoemulsification Cataract with Insertion Intraocular Lens (R) Operative Note     Date: 2024  OR Location: [facility] [place] [facility] OR    Name: Noy Ochoa : 1940, Age: 83 y.o., MRN: [id number], Sex: female    Diagnosis  Pre-op Diagnosis     * Combined form of age-related cataract, right eye [H25.811] Post-op Diagnosis     * Combined form of age-related cataract, right eye [H25.811]     Procedures  Phacoemulsification Cataract with Insertion Intraocular Lens  27183 - CT XCAPSL CTRC RMVL INSJ IO LENS PROSTH CPLX WO ECP      Surgeons      * Michelle Byrnes - Primary    Resident/Fellow/Other Assistant:  Surgeons and Role:  * No surgeons found with a matching role *    Procedure Summary  Anesthesia: Monitor Anesthesia Care  ASA: III  Anesthesia Staff: Anesthesiologist: Nneka Gibbons DO  CRNA: DAVID Edgar  Estimated Blood Loss: 0 mL  Intra-op Medications:   Administrations occurring from 1440 to 1530 on 24:   Medication Name Total Dose   balanced salts (BSS) intraocular solution 515 mL   povidone-iodine 5 % ophthalmic solution 1 Application   tetracaine (PF) 0.5 % ophthalmic solution 2 drop   EPINEPHrine HCl (PF) (Adrenalin) injection 0.3 mg   chondroitin sulf-sod hyaluron (Duovisc) intraocular kit 1 mL              Anesthesia Record               Intraprocedure I/O Totals       None           Specimen: No specimens collected     Staff:   Circulator: Pippa  Scrub Person: Nita Jama Scrub: Neda Jama Circulator: Kinza         Drains and/or Catheters: * None in log *    Tourniquet Times:         Implants:  Implants       Type Name Action Serial No.       23.5 DIOPTER, ACRYSOF IQ UV AND BLUE LIGHT FILTERING ACRYLIC FOLDABLE SINGLE-PIECE POSTERIOR CHAMBER LENSES W/ THE ULTRASERT PRE-LOADED DELIVERY SYSTEM, MODEL  Implanted [id number]              Findings: as below    Indications: Noy Ochoa is an 83 y.o. female who is having surgery for Combined form of age-related cataract,  right eye [H25.811].     Procedure Details: Patient name: Noy Ochoa   YOB: 1940   MRN: [id number]   Date of surgery: May 30, 2024  Preoperative diagnosis: Combined cataract of the RIGHT eye  Postoperative diagnosis: Combined cataract of the RIGHT eye  Procedure: Phacoemulsification of cataract with insertion of intraocular lens, RIGHT eye   Surgeon: Michelle Byrnes MD  Resident: Rhiannon Loera MD  Anesthesia: MAC  Complications: None  Lens Inserted: Efren ACU0T0 with a power of +23.50 D. Serial #: [id number]. Exp date: 05/15/2025.    Procedure description: After the risks, benefits, and alternatives of the planned procedure were discussed with the patient, informed consent was obtained at the preoperative evaluation. On the day of surgery, there were no updates to the consent form and any patient questions were answered. The patient was correctly identified in the preoperative area and the RIGHT eye was marked as the operative eye. Dilating drops were instilled into the RIGHT eye in the preoperative area. The patient was then taken back to the operating room and placed under sedation. The patient was prepped and draped in the standard, sterile ophthalmic fashion in preparation for intraocular surgery.    A lid speculum was placed into the RIGHT palpebral fissure and the operating microscope was brought into position. A paracentesis was made in superotemporal cornea at the limbus with a 1.0mm side port blade using a cotton tipped applicator to provide countertraction. Viscoat viscoelastic was injected into the anterior chamber. No lidocaine was used due to patient allergy. Using 0.12 forceps to stabilize the globe, a 2.4mm keratome blade was used to create a limbal clear-corneal incision inferotemporally. A bent-needle cystotome and Utrata forceps were used to create a continuous curvilinear capsulorrhexis. Balanced salt saline solution on a blunt-tipped cannula was used to achieve  hydrodissection.    A phacoemulsification device and a [place] spatula were used to remove the nucleus using a divide-and-conquer technique. Residual cortical material was removed with the irrigation and aspiration handpiece. Towards the end of cortical cleanup, zonular weakness/dehiscence was noted from 2-4 oclock. Provisc viscoelastic was then injected into the eye to reform the anterior chamber and to open the capsular bag. The posterior capsule was inspected and found to be clean and intact. The intraocular lens, Efren ACU0T0 with a power of +23.50 diopters was injected into the capsular bag. A lens positioner was used to center the lens and ensure good position within the bag. The haptic were positioned to be against the area of zonular weakness/dehiscence. The remaining viscoelastic was removed using irrigation and aspiration. Balanced salt saline solution on a blunt-tipped cannula was then used to hydrate the corneal stroma adjacent to the main wound and paracentesis site as well as to reform the anterior chamber. The wound was checked and found to be watertight with normal intraocular pressure verified using digital palpation. At the conclusion of the case, a well-centered intraocular lens with a good red reflex was observed. Tetracaine, betadine, BSS, and prednisolone acetate drops were instilled into the RIGHT eye. The lid speculum and drapes were removed. A clear plastic shield was then taped over the eye. The patient was taken to the recovery room in stable condition, having tolerated the procedure well. There were no complications.        Complications:  None; patient tolerated the procedure well.    Disposition: PACU - hemodynamically stable.  Condition: stable         Additional Details: none    Attending Attestation: I performed the procedure.    Michelle Byrnes  Phone Number: [phone]

## 2024-05-30 NOTE — ANESTHESIA POSTPROCEDURE EVALUATION
Patient: Noy Ochoa    Procedure Summary       Date: 05/30/24 Room / [facility]: [facility]    Anesthesia Start: 1429 Anesthesia Stop: 1520    Procedure: Phacoemulsification Cataract with Insertion Intraocular Lens (Right: Eye) Diagnosis:       Combined form of age-related cataract, right eye      (Combined form of age-related cataract, right eye [H25.811])    Surgeons: Michelle Byrnes MD Responsible Provider: Nneka Gibbons DO    Anesthesia Type: MAC ASA Status: 3            Anesthesia Type: MAC    Vitals Value Taken Time   /70 05/30/24 1516   Temp 36.4 °C (97.5 °F) 05/30/24 1516   Pulse 85 05/30/24 1516   Resp 16 05/30/24 1516   SpO2 95 % 05/30/24 1516       Anesthesia Post Evaluation    Patient location during evaluation: PACU  Patient participation: complete - patient participated  Level of consciousness: awake  Pain management: satisfactory to patient  Multimodal analgesia pain management approach  Airway patency: patent  Cardiovascular status: acceptable  Respiratory status: acceptable  Hydration status: acceptable  Postoperative Nausea and Vomiting: none    There were no known notable events for this encounter.

## 2024-05-31 ENCOUNTER — OFFICE VISIT (OUTPATIENT)
Dept: OPHTHALMOLOGY | Facility: CLINIC | Age: 84
End: 2024-05-31
Payer: MEDICARE

## 2024-05-31 DIAGNOSIS — H52.03 HYPEROPIA, BILATERAL: ICD-10-CM

## 2024-05-31 DIAGNOSIS — H52.4 PRESBYOPIA: ICD-10-CM

## 2024-05-31 DIAGNOSIS — H52.203 ASTIGMATISM OF BOTH EYES, UNSPECIFIED TYPE: ICD-10-CM

## 2024-05-31 DIAGNOSIS — Z96.1 PSEUDOPHAKIA: Primary | ICD-10-CM

## 2024-05-31 DIAGNOSIS — H35.363 MACULAR DRUSEN, BILATERAL: ICD-10-CM

## 2024-05-31 PROCEDURE — 99024 POSTOP FOLLOW-UP VISIT: CPT | Performed by: OPHTHALMOLOGY

## 2024-05-31 ASSESSMENT — VISUAL ACUITY
METHOD: SNELLEN - LINEAR
OD_SC: 20/50
OD_SC+: +1

## 2024-05-31 ASSESSMENT — ENCOUNTER SYMPTOMS
HEMATOLOGIC/LYMPHATIC NEGATIVE: 0
CONSTITUTIONAL NEGATIVE: 0
EYES NEGATIVE: 1
ENDOCRINE NEGATIVE: 0
CARDIOVASCULAR NEGATIVE: 0
MUSCULOSKELETAL NEGATIVE: 0
RESPIRATORY NEGATIVE: 0
NEUROLOGICAL NEGATIVE: 0
ALLERGIC/IMMUNOLOGIC NEGATIVE: 0
GASTROINTESTINAL NEGATIVE: 0
PSYCHIATRIC NEGATIVE: 0

## 2024-05-31 ASSESSMENT — TONOMETRY
IOP_METHOD: GOLDMANN APPLANATION
OD_IOP_MMHG: 16

## 2024-05-31 ASSESSMENT — SLIT LAMP EXAM - LIDS
COMMENTS: GOOD POSITION, DERMATOCHALASIS
COMMENTS: GOOD POSITION, DERMATOCHALASIS

## 2024-05-31 ASSESSMENT — EXTERNAL EXAM - LEFT EYE: OS_EXAM: NORMAL

## 2024-05-31 ASSESSMENT — EXTERNAL EXAM - RIGHT EYE: OD_EXAM: NORMAL

## 2024-05-31 NOTE — PATIENT INSTRUCTIONS
Surgeon: Michelle Byrnes MD      Patient name: Noy Ochoa    Date of visit: May 31, 2024      right eye    Prednisolone acetate (pink or white cap) - 4 times a day    Ofloxacin (tan cap) - 4 times a day    Ketorolac (gray cap) - 4 times a day      No heavy lifting over 10-15 lbs, no bending over, do not get eye wet, no eye rubbing. Wear eye shield at bedtime and sunglasses/glasses during the day. Please call immediately if you develop any redness, pain, decreased vision, flashes, floaters.       Surgical Scheduler (during office hours): [place]: [phone]  Office phone (after hours): [phone] : [phone]                     Pager: [phone] for Dr. Last

## 2024-05-31 NOTE — PROGRESS NOTES
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 Good vision. IOP good.  Prednisolone acetate 1% - 4 times a day  Ofloxacin - 4 times a day  Ketorolac - 4 times a day  -D/w patient and son, noted to have zonular weakness/dehiscence from 2-4 oclock. Most likely, IOL should remain stable, but there is a small chance of IOL dislocation in lifetime which may result in IOL dislocation and potential need for additional surgery.   No heavy lifting over 10-15 lbs, no bending over, do not get eye wet, no eye rubbing. Wear eye shield at bedtime and sunglasses/glasses during the day. Advised to call if any redness, pain, decreased vision, flashes, floaters. F/u 1 week - refract both eyes (autorefract first).     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 Good vision. IOP good. Off all gtts.     Macular waxcedV90.369  -OCT macula (8/15/23) - Normal thickness and contour OU.  Intact IS-OS OU.  No edema OU.  Few scattered drusen OU. 248/244.  Stable from 12/7/2021.  -Not visually significant at this time. Monitor.     SqqpvrhpjO93.00  LvpkpidepxdB89.209  CmafkbibwoG73.4  -Patient lost current glasses. Advised caution with driving at night.  -Defer new Rx. No significant improvement in vision with refraction at this time.       No history of refractive surgery.   No FH of AMD/glaucoma      Attending Attestation Statement for Evaluation and Management Services:    I was physically present and/or personally examined the patient during the evaluation of this patient. I reviewed the documentation and confirm the resident's note.

## 2024-06-04 ENCOUNTER — OFFICE VISIT (OUTPATIENT)
Dept: OPHTHALMOLOGY | Facility: CLINIC | Age: 84
End: 2024-06-04
Payer: MEDICARE

## 2024-06-04 DIAGNOSIS — H52.4 PRESBYOPIA: ICD-10-CM

## 2024-06-04 DIAGNOSIS — H52.03 HYPEROPIA, BILATERAL: ICD-10-CM

## 2024-06-04 DIAGNOSIS — H35.363 MACULAR DRUSEN, BILATERAL: ICD-10-CM

## 2024-06-04 DIAGNOSIS — Z96.1 PSEUDOPHAKIA: Primary | ICD-10-CM

## 2024-06-04 DIAGNOSIS — H52.203 ASTIGMATISM OF BOTH EYES, UNSPECIFIED TYPE: ICD-10-CM

## 2024-06-04 PROCEDURE — 99024 POSTOP FOLLOW-UP VISIT: CPT | Performed by: OPHTHALMOLOGY

## 2024-06-04 ASSESSMENT — ENCOUNTER SYMPTOMS
HEMATOLOGIC/LYMPHATIC NEGATIVE: 0
MUSCULOSKELETAL NEGATIVE: 0
EYES NEGATIVE: 1
CONSTITUTIONAL NEGATIVE: 0
ENDOCRINE NEGATIVE: 0
PSYCHIATRIC NEGATIVE: 0
ALLERGIC/IMMUNOLOGIC NEGATIVE: 0
CARDIOVASCULAR NEGATIVE: 0
GASTROINTESTINAL NEGATIVE: 0
RESPIRATORY NEGATIVE: 0
NEUROLOGICAL NEGATIVE: 0

## 2024-06-04 ASSESSMENT — REFRACTION_MANIFEST
OS_CYLINDER: -1.00
OS_ADD: +3.00
OD_AXIS: 072
OD_CYLINDER: -0.50
OS_SPHERE: PLANO
OS_AXIS: 088
METHOD_AUTOREFRACTION: 1
OS_CYLINDER: -1.00
OS_SPHERE: PLANO
OS_AXIS: 090
OD_SPHERE: -0.75
OD_ADD: +3.00
OD_AXIS: 070
OD_SPHERE: -0.50
OD_CYLINDER: -0.50

## 2024-06-04 ASSESSMENT — TONOMETRY
OS_IOP_MMHG: 16
IOP_METHOD: GOLDMANN APPLANATION
OD_IOP_MMHG: 16

## 2024-06-04 ASSESSMENT — REFRACTION_WEARINGRX
OD_ADD: +2.50
OD_CYLINDER: -0.50
OD_AXIS: 090
OS_ADD: +2.50
OD_SPHERE: +1.00
OS_AXIS: 090
OS_CYLINDER: -0.50
OS_SPHERE: +1.50

## 2024-06-04 ASSESSMENT — SLIT LAMP EXAM - LIDS
COMMENTS: GOOD POSITION, DERMATOCHALASIS
COMMENTS: GOOD POSITION, DERMATOCHALASIS

## 2024-06-04 ASSESSMENT — VISUAL ACUITY
OD_SC+: +2
METHOD: SNELLEN - LINEAR
OS_SC: 20/30
OD_SC: 20/40

## 2024-06-04 ASSESSMENT — EXTERNAL EXAM - LEFT EYE: OS_EXAM: NORMAL

## 2024-06-04 ASSESSMENT — EXTERNAL EXAM - RIGHT EYE: OD_EXAM: NORMAL

## 2024-06-04 NOTE — PROGRESS NOTES
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 Good vision. IOP good.  Prednisolone acetate 1% - 4/3/2/1 weekly taper  Ofloxacin - 4 times a day x 1 more week, then stop  Ketorolac - 4 times a day x 1 more week, then stop  -Noted intraoperatively to have zonular weakness/dehiscence from 2-4 oclock. Most likely, IOL should remain stable, but there is a small chance of IOL dislocation in lifetime which may result in IOL dislocation and potential need for additional surgery.   No heavy lifting over 15-20 lbs, do not get eye wet, no eye rubbing. Discontinue eye shield at bedtime but wear sunglasses/glasses during the day. Advised to call if any redness, pain, decreased vision, flashes, floaters. F/u 6-8 weeks - refract both eyes and dilate right eye.     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 Good vision. IOP good. Off all gtts.     Macular [id number]  -OCT macula (8/15/23) - Normal thickness and contour OU.  Intact IS-OS OU.  No edema OU.  Few scattered drusen OU. 248/244.  Stable from 12/7/2021.  -Not visually significant at this time. Monitor.     KczdstmpmR34.00  QnkkkkhfrqiT56.209  LzrtfcewzyS27.4  -F/u 6-8 weeks - refract both eyes and dilate right eye.       No history of refractive surgery.   No FH of AMD/glaucoma      Attending Attestation Statement for Evaluation and Management Services:    I was physically present and/or personally examined the patient during the evaluation of this patient. I reviewed the documentation and confirm the resident's note.

## 2024-06-04 NOTE — PATIENT INSTRUCTIONS
Surgeon: Michelle Byrnes MD                   [phone]      Patient name: Noy Ochoa    Date of visit: June 4, 2024      right eye    Prednisolone acetate (pink or white cap) - 4 times a day for 1 week, 3 times a day for 1 week, 2 times a day for 1 week, once a day for 1 week, then stop    Ofloxacin (tan cap) - 4 times a day for 1 more week, then stop    Ketorolac (gray cap) - 4 times a day for 1 more week, then stop      No heavy lifting over 15-20 lbs, try not to get eye wet, no eye rubbing. You may stop wearing the eye shield at night. Please continue wearing glasses or sunglasses during the day to protect your eyes. Please call immediately if you develop any redness, pain, decreased vision, flashes, floaters.     Surgical Scheduler (during office hours) - [place]: [phone]

## 2024-06-19 ENCOUNTER — OFFICE VISIT (OUTPATIENT)
Dept: NEUROLOGY | Facility: CLINIC | Age: 84
End: 2024-06-19
Payer: MEDICARE

## 2024-06-19 ENCOUNTER — APPOINTMENT (OUTPATIENT)
Dept: GERIATRIC MEDICINE | Facility: CLINIC | Age: 84
End: 2024-06-19
Payer: MEDICARE

## 2024-06-19 VITALS
HEART RATE: 101 BPM | SYSTOLIC BLOOD PRESSURE: 142 MMHG | WEIGHT: 171.4 LBS | TEMPERATURE: 98.6 F | RESPIRATION RATE: 16 BRPM | BODY MASS INDEX: 30.36 KG/M2 | DIASTOLIC BLOOD PRESSURE: 68 MMHG

## 2024-06-19 DIAGNOSIS — F32.89 OTHER DEPRESSION: Primary | ICD-10-CM

## 2024-06-19 DIAGNOSIS — F01.A0 MILD VASCULAR DEMENTIA WITHOUT BEHAVIORAL DISTURBANCE, PSYCHOTIC DISTURBANCE, MOOD DISTURBANCE, OR ANXIETY (MULTI): ICD-10-CM

## 2024-06-19 PROCEDURE — 99215 OFFICE O/P EST HI 40 MIN: CPT | Performed by: PSYCHIATRY & NEUROLOGY

## 2024-06-19 PROCEDURE — 3078F DIAST BP <80 MM HG: CPT | Performed by: PSYCHIATRY & NEUROLOGY

## 2024-06-19 PROCEDURE — 3077F SYST BP >= 140 MM HG: CPT | Performed by: PSYCHIATRY & NEUROLOGY

## 2024-06-19 PROCEDURE — 1157F ADVNC CARE PLAN IN RCRD: CPT | Performed by: PSYCHIATRY & NEUROLOGY

## 2024-06-19 PROCEDURE — 1126F AMNT PAIN NOTED NONE PRSNT: CPT | Performed by: PSYCHIATRY & NEUROLOGY

## 2024-06-19 RX ORDER — SERTRALINE HYDROCHLORIDE 100 MG/1
100 TABLET, FILM COATED ORAL DAILY
Qty: 90 TABLET | Refills: 3 | Status: SHIPPED | OUTPATIENT
Start: 2024-06-19 | End: 2025-06-19

## 2024-06-19 RX ORDER — SERTRALINE HYDROCHLORIDE 50 MG/1
50 TABLET, FILM COATED ORAL DAILY
Qty: 90 TABLET | Refills: 3 | Status: SHIPPED | OUTPATIENT
Start: 2024-06-19 | End: 2025-06-19

## 2024-06-19 ASSESSMENT — MONTREAL COGNITIVE ASSESSMENT (MOCA)
8. SERIAL SUBTRACTION OF 7S: 1
10. [FLUENCY] NAME WORDS STARTING WITH DESIGNATED LETTER: 0
13. ORIENTATION SUBSCORE: 5
7. [VIGILENCE] TAP WHEN HEARING DESIGNATED LETTER: 1
12. MEMORY INDEX SCORE: 4
WHAT LEVEL OF EDUCATION WAS ATTAINED: 0
VISUOSPATIAL/EXECUTIVE SUBSCORE: 4
9. REPEAT EACH SENTENCE: 2
WHAT IS THE TOTAL SCORE (OUT OF 30): 24
11. FOR EACH PAIR OF WORDS, WHAT CATEGORY DO THEY BELONG TO (OUT OF 2): 2
5. MEMORY TRIALS: 0
6. READ LIST OF DIGITS [FORWARD/BACKWARD]: 2
4. NAME EACH OF THE THREE ANIMALS SHOWN: 3

## 2024-06-19 ASSESSMENT — PATIENT HEALTH QUESTIONNAIRE - PHQ9
1. LITTLE INTEREST OR PLEASURE IN DOING THINGS: NOT AT ALL
2. FEELING DOWN, DEPRESSED OR HOPELESS: NOT AT ALL
SUM OF ALL RESPONSES TO PHQ9 QUESTIONS 1 AND 2: 0

## 2024-06-19 ASSESSMENT — ENCOUNTER SYMPTOMS
LOSS OF SENSATION IN FEET: 0
DEPRESSION: 0
OCCASIONAL FEELINGS OF UNSTEADINESS: 0

## 2024-06-19 ASSESSMENT — PAIN SCALES - GENERAL: PAINLEVEL: 0-NO PAIN

## 2024-06-19 NOTE — PROGRESS NOTES
Start Time:1:35pm  Stop Time:2:15pm    Chart reviewed, including physician notes and diagnostic studies, for 4 minutes prior to this appointment.      Accompanied by: son (Pio)    Formulation: Ms. Ochoa is a very-pleasant 83 y.o. year old,  female with a past personal history of multifactorial dementia who is presenting today for a follow-up visit.  Her cognition and level of functioning remain stable.  Her mood has been euthymic for quite some time.      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), mild severity  cerebrovascular disease   history of traumatic brain injury     Plan:  -  decrease sertraline (Zoloft) to one 100mg pill and 1.5 of the 50mg pills (total=175mg) for two weeks, then reduce to one 100mg pill and one 50mg pill (total=150mg) daily    - get driving evaluation  - follow-up with physical therapy  - continue current medications  - follow-up with me in about 6 months  -----------------------------------------------------------------------------------------------------------------------------    History of Present Illness:  I last saw the patient in December 2023, at which time they were to get a driving evaluation.  She has had cataracts surgery (May 2024).  She has contracture of her hand from the IV.  Mood is good.  Sleeps well.  Appetite is good.  No passive death wish or self-injurious behavior.  She feels like her memory is about the same.  Her son feels like her short-term memory is not as good.  Sometimes he needs to repeat himself because of her memory.  Her sense of spatial direction may also not be as good.  She has not had a driving evaluation yet because she is recovering from her cataracts.  No psychosis.  No changes in walking or movement.   Her family notes that she may be more unsteady and is less certain on her feet.        Allergies   Allergen Reactions    Aspirin Swelling    Food Extracts Swelling     Cod fish, david,  onion and hopps    Violet Unknown    Latex Unknown    Lidocaine Swelling         Current Outpatient Medications:     alendronate (Fosamax) 70 mg tablet, Take 1 tablet (70 mg) by mouth every 7 days., Disp: 4 tablet, Rfl: 0    amLODIPine (Norvasc) 2.5 mg tablet, Take 1 tablet (2.5 mg) by mouth once daily., Disp: 30 tablet, Rfl: 0    ascorbic acid, vitamin C, 500 mg capsule, Take 500 mg by mouth., Disp: , Rfl:     atorvastatin (Lipitor) 40 mg tablet, Take 1 tablet (40 mg) by mouth once daily., Disp: 30 tablet, Rfl: 0    calcium carbonate (Oscal) 500 mg calcium (1,250 mg) tablet, Take 1 tablet (1,250 mg) by mouth once daily., Disp: , Rfl:     cholecalciferol (Vitamin D-3) 125 MCG (5000 UT) capsule, Take 1 capsule (125 mcg) by mouth., Disp: , Rfl:     ketorolac (Acular) 0.5 % ophthalmic solution, 1 drop to surgical eye 4 times a day starting 1 day before surgery, Disp: 5 mL, Rfl: 2    multivitamin capsule, Take by mouth., Disp: , Rfl:     ofloxacin (Ocuflox) 0.3 % ophthalmic solution, 1 drop to operative eye 4 times a day starting 1 day before surgery, Disp: 5 mL, Rfl: 2    oxybutynin XL (Ditropan-XL) 10 mg 24 hr tablet, Take 1 tablet (10 mg) by mouth once daily. Do not crush, chew, or split., Disp: , Rfl:     prednisoLONE acetate (Pred-Forte) 1 % ophthalmic suspension, 1 drop to operative eye 4 times a day starting the day of surgery (after surgery is done), Disp: 5 mL, Rfl: 2    sertraline (Zoloft) 100 mg tablet, Take 1 tablet (100 mg) by mouth once daily., Disp: 90 tablet, Rfl: 3    sertraline (Zoloft) 50 mg tablet, Take 1 tablet (50 mg) by mouth once daily., Disp: 90 tablet, Rfl: 3    Review of Systems  As per HPI, otherwise all other systems have been reviewed are negative for complaint.      Objective   /68   Pulse 101   Temp 37 °C (98.6 °F) (Tympanic)   Resp 16   Wt 77.7 kg (171 lb 6.4 oz)   BMI 30.36 kg/m²     EXAMINATION  Mental Status Examination  General appearance: well kept, good eye contact,  "cooperative  Orientation: alert and oriented to time, place, and person  Motor: within normal limits, gait is stable  Speech: normal rate, tone and rhythm  Mood: euthymic  Affect: Euthymic, full-range  Passive death wish: No  Suicidal ideation: No  Thought process: logical, linear, and goal-directed  Thought content: Hallucinations:no, Delusions: none, denied; and not responding to internal stimuli  Insight/Judgment: Good/Good  Fund of knowledge: Good  Recent and remote memory: Fair/Good  Attention span and concentration: Good  Language: regular, rate, rhythm, tone, and volume and without paraphrasic errors    MoCA (06/19/2024): 24/30 (-1 visuospatial/executive, -2 attention, -1 language, -1 delayed recall, -1 orientation)   \"F\"= 10 words  MIS: 13/15    MoCA ( 08/16/2023): 22/30 (-1 visuospatial/executive, -1 attention, -1 language, -3 delayed recall, -1 orientation)  \"f\"=8 words  MIS: 10/15     MoCA (8/25/2021): 26/30 (-1 visuospatial/executive, -2 attention, -1 orientation)     MoCA ( 07/08/2020): 27/30 (-2 visuospatial/executive, -1 attention)  \"f\"=11 words  MIS: 15/5     MoCA (11/12/18): 23/30     MoCA (11/2017): 23/30     MoCA (10/16) 27/30   "

## 2024-06-19 NOTE — PATIENT INSTRUCTIONS
-  decrease sertraline (Zoloft) to one 100mg pill and 1.5 of the 50mg pills (total=175mg) for two weeks, then reduce to one 100mg pill and one 50mg pill (total=150mg) daily    - get driving evaluation  - follow-up with physical therapy  - continue current medications  - follow-up with me in about 6 months    General brain health guidelines:  - make sure your other medical conditions are well controlled (e.g., high blood pressure, high cholesterol, diabetes, sleep apnea etc)  - do not smoke or chew tobacco  - address any sensory deficits (e.g., proper glasses for poor eyesight, hearing aids for hearing loss)  - use a weekly pill box  - eat a heart healthy diet (e.g., lots of fruits and vegetables, low fat and cholesterol)  - exercise at least four days per week, 30 minutes at a time at an intensity that would make it difficult to converse with someone   - make sure you are getting at least 7 hours of quality sleep per night  - keep yourself mentally active daily by reading, playing cards, doing word searches, puzzles, etc.  - stay socially active by being part of a group or organization    Please note that the above may not be an entirely accurate or complete list of your medications, allergies, past medical history, past surgical history, or active problems as the document is completed following your visit.

## 2024-07-09 ASSESSMENT — EXTERNAL EXAM - RIGHT EYE: OD_EXAM: NORMAL

## 2024-07-09 ASSESSMENT — SLIT LAMP EXAM - LIDS
COMMENTS: GOOD POSITION, DERMATOCHALASIS
COMMENTS: GOOD POSITION, DERMATOCHALASIS

## 2024-07-09 ASSESSMENT — EXTERNAL EXAM - LEFT EYE: OS_EXAM: NORMAL

## 2024-07-09 ASSESSMENT — CUP TO DISC RATIO: OD_RATIO: .2

## 2024-07-09 NOTE — PROGRESS NOTES
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 Good vision. IOP good. Off all gtts.   -OD - Noted intraoperatively to have zonular weakness/dehiscence from 2-4 oclock. Most likely, IOL should remain stable, but there is a small chance of IOL dislocation in lifetime which may result in IOL dislocation and potential need for additional surgery.   -F/u 6-8 months - refract OU, dilate OU for PCO check, and OCT macula.     Macular [id number]  -OCT macula (8/15/23) - Normal thickness and contour OU.  Intact IS-OS OU.  No edema OU.  Few scattered drusen OU. 248/244.  Stable from 12/7/2021.  -Not visually significant at this time. Monitor.     HhwrpjkmsS62.00  ZyerqusaueeW35.209  WntklhnqewM52.4  -New Rx given      No history of refractive surgery.   No FH of AMD/glaucoma      **Patient informed me for the first time today, that she had an IV placed in the back of the left hand on 5/2/24 at the time of cataract surgery, and since then, her left 4th finger has been swollen, contracted, and painful. Patient states it is better than it has been, but has not resolved. I advised patient to discuss with primary doctor to see if patient needs a referral. I will also message PMD.

## 2024-07-12 DIAGNOSIS — K21.9 GASTROESOPHAGEAL REFLUX DISEASE WITHOUT ESOPHAGITIS: ICD-10-CM

## 2024-07-12 DIAGNOSIS — I10 BENIGN ESSENTIAL HYPERTENSION: ICD-10-CM

## 2024-07-12 RX ORDER — AMLODIPINE BESYLATE 2.5 MG/1
2.5 TABLET ORAL DAILY
Qty: 30 TABLET | Refills: 0 | Status: SHIPPED | OUTPATIENT
Start: 2024-07-12 | End: 2024-08-11

## 2024-07-12 RX ORDER — ATORVASTATIN CALCIUM 40 MG/1
40 TABLET, FILM COATED ORAL DAILY
Qty: 30 TABLET | Refills: 0 | Status: SHIPPED | OUTPATIENT
Start: 2024-07-12 | End: 2024-08-11

## 2024-07-12 RX ORDER — ALENDRONATE SODIUM 70 MG/1
70 TABLET ORAL
Qty: 4 TABLET | Refills: 0 | Status: SHIPPED | OUTPATIENT
Start: 2024-07-12

## 2024-07-19 ENCOUNTER — APPOINTMENT (OUTPATIENT)
Dept: OPHTHALMOLOGY | Facility: CLINIC | Age: 84
End: 2024-07-19
Payer: MEDICARE

## 2024-07-19 DIAGNOSIS — H35.363 MACULAR DRUSEN, BILATERAL: ICD-10-CM

## 2024-07-19 DIAGNOSIS — H52.203 ASTIGMATISM OF BOTH EYES, UNSPECIFIED TYPE: ICD-10-CM

## 2024-07-19 DIAGNOSIS — Z96.1 PSEUDOPHAKIA: Primary | ICD-10-CM

## 2024-07-19 DIAGNOSIS — H52.03 HYPEROPIA, BILATERAL: ICD-10-CM

## 2024-07-19 DIAGNOSIS — H52.4 PRESBYOPIA: ICD-10-CM

## 2024-07-19 PROCEDURE — 1159F MED LIST DOCD IN RCRD: CPT | Performed by: OPHTHALMOLOGY

## 2024-07-19 PROCEDURE — 1036F TOBACCO NON-USER: CPT | Performed by: OPHTHALMOLOGY

## 2024-07-19 PROCEDURE — 1160F RVW MEDS BY RX/DR IN RCRD: CPT | Performed by: OPHTHALMOLOGY

## 2024-07-19 PROCEDURE — 99024 POSTOP FOLLOW-UP VISIT: CPT | Performed by: OPHTHALMOLOGY

## 2024-07-19 PROCEDURE — 1157F ADVNC CARE PLAN IN RCRD: CPT | Performed by: OPHTHALMOLOGY

## 2024-07-19 ASSESSMENT — ENCOUNTER SYMPTOMS
RESPIRATORY NEGATIVE: 0
ALLERGIC/IMMUNOLOGIC NEGATIVE: 0
PSYCHIATRIC NEGATIVE: 0
CONSTITUTIONAL NEGATIVE: 0
NEUROLOGICAL NEGATIVE: 0
EYES NEGATIVE: 1
HEMATOLOGIC/LYMPHATIC NEGATIVE: 0
GASTROINTESTINAL NEGATIVE: 0
CARDIOVASCULAR NEGATIVE: 0
MUSCULOSKELETAL NEGATIVE: 0
ENDOCRINE NEGATIVE: 0

## 2024-07-19 ASSESSMENT — REFRACTION_WEARINGRX
OD_SPHERE: +1.00
OS_CYLINDER: -0.50
OD_AXIS: 090
OD_ADD: +2.50
OS_SPHERE: +1.50
OS_ADD: +2.50
OS_AXIS: 090
OD_CYLINDER: -0.50

## 2024-07-19 ASSESSMENT — REFRACTION_MANIFEST
OD_ADD: +2.50
OD_SPHERE: PLANO
OS_AXIS: 075
OS_ADD: +2.50
OD_CYLINDER: -0.75
OS_CYLINDER: -0.25
OD_AXIS: 070
OS_SPHERE: +0.25

## 2024-07-19 ASSESSMENT — TONOMETRY
IOP_METHOD: GOLDMANN APPLANATION
OD_IOP_MMHG: 11
OS_IOP_MMHG: 10

## 2024-07-19 ASSESSMENT — VISUAL ACUITY
OD_SC: 20/30
METHOD: SNELLEN - LINEAR
OS_SC: 20/30

## 2024-08-05 ENCOUNTER — TELEPHONE (OUTPATIENT)
Dept: NEUROLOGY | Facility: CLINIC | Age: 84
End: 2024-08-05
Payer: MEDICARE

## 2024-08-05 DIAGNOSIS — K21.9 GASTROESOPHAGEAL REFLUX DISEASE WITHOUT ESOPHAGITIS: ICD-10-CM

## 2024-08-05 DIAGNOSIS — F01.A0 MILD VASCULAR DEMENTIA WITHOUT BEHAVIORAL DISTURBANCE, PSYCHOTIC DISTURBANCE, MOOD DISTURBANCE, OR ANXIETY (MULTI): Primary | ICD-10-CM

## 2024-08-06 RX ORDER — ALENDRONATE SODIUM 70 MG/1
70 TABLET ORAL
Qty: 12 TABLET | Refills: 0 | Status: SHIPPED | OUTPATIENT
Start: 2024-08-06

## 2024-08-08 ENCOUNTER — LAB (OUTPATIENT)
Dept: LAB | Facility: LAB | Age: 84
End: 2024-08-08
Payer: MEDICARE

## 2024-08-08 ENCOUNTER — APPOINTMENT (OUTPATIENT)
Dept: PRIMARY CARE | Facility: CLINIC | Age: 84
End: 2024-08-08
Payer: MEDICARE

## 2024-08-08 VITALS — SYSTOLIC BLOOD PRESSURE: 125 MMHG | DIASTOLIC BLOOD PRESSURE: 75 MMHG

## 2024-08-08 DIAGNOSIS — R73.02 GLUCOSE INTOLERANCE (IMPAIRED GLUCOSE TOLERANCE): ICD-10-CM

## 2024-08-08 DIAGNOSIS — N39.41 URGE INCONTINENCE OF URINE: ICD-10-CM

## 2024-08-08 DIAGNOSIS — Q82.9 SKIN ANOMALY: ICD-10-CM

## 2024-08-08 DIAGNOSIS — M72.0 DUPUYTREN'S CONTRACTURE: ICD-10-CM

## 2024-08-08 DIAGNOSIS — R73.02 GLUCOSE INTOLERANCE (IMPAIRED GLUCOSE TOLERANCE): Primary | ICD-10-CM

## 2024-08-08 LAB
EST. AVERAGE GLUCOSE BLD GHB EST-MCNC: 134 MG/DL
HBA1C MFR BLD: 6.3 %

## 2024-08-08 PROCEDURE — 83036 HEMOGLOBIN GLYCOSYLATED A1C: CPT

## 2024-08-08 PROCEDURE — 3078F DIAST BP <80 MM HG: CPT | Performed by: INTERNAL MEDICINE

## 2024-08-08 PROCEDURE — 99213 OFFICE O/P EST LOW 20 MIN: CPT | Performed by: INTERNAL MEDICINE

## 2024-08-08 PROCEDURE — 36415 COLL VENOUS BLD VENIPUNCTURE: CPT

## 2024-08-08 PROCEDURE — 1157F ADVNC CARE PLAN IN RCRD: CPT | Performed by: INTERNAL MEDICINE

## 2024-08-08 PROCEDURE — 3074F SYST BP LT 130 MM HG: CPT | Performed by: INTERNAL MEDICINE

## 2024-08-08 NOTE — PROGRESS NOTES
Subjective   Patient ID: Noy Ochoa is a 83 y.o. female who presents for No chief complaint on file..    HPI follow-up visit no chest pain no shortness of breath no side effect with medication no polyuria polydipsia no hypoglycemic events complains of some contracture in the fourth finger of her left hand she had an IV for cataract surgery on the dorsum of that aspect thinks they are related she has had a growth on the right side of her nose she had seen some testing and visit from the urologist but would like a urogynecology referral    Review of Systems    Objective   /75     Physical Exam vital signs noted alert and oriented NCAT no JVD or bruit chest clear to auscultation CV regular rate and rhythm S1-S2 extremities no clubbing cyanosis or edema normal distal pulses musculoskeletal contracture and Dupuytren of the fourth finger of the left hand dorsum appears normal    Assessment/Plan    impression glucose intolerance other diagnoses Dupuytren's contracture skin anomaly/seborrheic keratoses urine incontinence  Plan okay for another urogynecology referral requisition to be made  Okay for dermatologist for the small lesion on the right side of her nose  Referral to be made  Orthopedic hand surgery for the Dupuytren's contracture DJD changes  Referral to be made  Check clinical hemoglobin advised on long-term blood sugar test  Good diet exercise weight stability continue with other medications and recheck based on above    Review  medications reviewed laboratory results placed referrals (check)

## 2024-08-29 ENCOUNTER — TELEPHONE (OUTPATIENT)
Dept: GERIATRIC MEDICINE | Facility: CLINIC | Age: 84
End: 2024-08-29
Payer: MEDICARE

## 2024-08-29 NOTE — TELEPHONE ENCOUNTER
"Patient left a voicemail message stating \"I don't know what Im calling for, I was directed here.\" Returned call, left voicemail message to call the office.   "

## 2024-08-30 ENCOUNTER — OFFICE VISIT (OUTPATIENT)
Dept: DERMATOLOGY | Facility: CLINIC | Age: 84
End: 2024-08-30
Payer: MEDICARE

## 2024-08-30 DIAGNOSIS — D22.5 MELANOCYTIC NEVUS OF TRUNK: ICD-10-CM
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
## 2024-08-30 DIAGNOSIS — L57.8 DIFFUSE PHOTODAMAGE OF SKIN: ICD-10-CM

## 2024-08-30 DIAGNOSIS — L82.1 SEBORRHEIC KERATOSIS: ICD-10-CM

## 2024-08-30 DIAGNOSIS — D18.01 HEMANGIOMA OF SKIN: ICD-10-CM

## 2024-08-30 DIAGNOSIS — D48.5 NEOPLASM OF UNCERTAIN BEHAVIOR OF SKIN: Primary | ICD-10-CM

## 2024-08-30 PROCEDURE — 1159F MED LIST DOCD IN RCRD: CPT | Performed by: DERMATOLOGY

## 2024-08-30 PROCEDURE — 99204 OFFICE O/P NEW MOD 45 MIN: CPT | Performed by: DERMATOLOGY

## 2024-08-30 PROCEDURE — 1157F ADVNC CARE PLAN IN RCRD: CPT | Performed by: DERMATOLOGY

## 2024-08-30 PROCEDURE — 11311 SHAVE SKIN LESION 0.6-1.0 CM: CPT | Performed by: DERMATOLOGY

## 2024-08-30 PROCEDURE — 11302 SHAVE SKIN LESION 1.1-2.0 CM: CPT | Performed by: DERMATOLOGY

## 2024-08-30 RX ORDER — HYDROCORTISONE 25 MG/G
CREAM TOPICAL
Qty: 30 G | Refills: 1 | Status: SHIPPED | OUTPATIENT
Start: 2024-08-30

## 2024-08-30 ASSESSMENT — DERMATOLOGY PATIENT ASSESSMENT
DO YOU USE SUNSCREEN: OCCASIONALLY
DO YOU HAVE IRREGULAR MENSTRUAL CYCLES: NO
ARE YOU TRYING TO GET PREGNANT: NO
DO YOU USE A TANNING BED: NO
ARE YOU AN ORGAN TRANSPLANT RECIPIENT: NO
ARE YOU ON BIRTH CONTROL: NO
DO YOU HAVE ANY NEW OR CHANGING LESIONS: YES

## 2024-08-30 ASSESSMENT — DERMATOLOGY QUALITY OF LIFE (QOL) ASSESSMENT: ARE THERE EXCLUSIONS OR EXCEPTIONS FOR THE QUALITY OF LIFE ASSESSMENT: NO

## 2024-08-30 ASSESSMENT — ITCH NUMERIC RATING SCALE: HOW SEVERE IS YOUR ITCHING?: 0

## 2024-08-31 NOTE — PROGRESS NOTES
"Subjective     Noy Ochoa is a 83 y.o. female who presents for the following: Skin Check.  She notes a bump on the right side of her nose, which has been present for 1 year, has increased in size, and hurts.  She also notes recent bug bites on her arms and legs, which are very itchy.  She denies any other new, changing, or concerning skin lesions; no bleeding, itching, or burning lesions.      Review of Systems:  No other skin or systemic complaints other than what is documented elsewhere in the note.    The following portions of the chart were reviewed this encounter and updated as appropriate:       Skin Cancer History  No skin cancer on file.    Specialty Problems    None      Past Dermatologic / Past Relevant Medical History:    No history of atypical nevi or skin cancer    Family History:    No family history of melanoma or skin cancer    Social History:    The patient is accompanied by her \"friend,\" Natalie    Allergies:  Aspirin, Food extracts, Violet, Latex, and Lidocaine    Current Medications / CAM's:    Current Outpatient Medications:     alendronate (Fosamax) 70 mg tablet, Take 1 tablet (70 mg) by mouth every 7 days., Disp: 12 tablet, Rfl: 0    ascorbic acid, vitamin C, 500 mg capsule, Take 500 mg by mouth., Disp: , Rfl:     calcium carbonate (Oscal) 500 mg calcium (1,250 mg) tablet, Take 1 tablet (1,250 mg) by mouth once daily., Disp: , Rfl:     cholecalciferol (Vitamin D-3) 125 MCG (5000 UT) capsule, Take 1 capsule (125 mcg) by mouth., Disp: , Rfl:     ketorolac (Acular) 0.5 % ophthalmic solution, 1 drop to surgical eye 4 times a day starting 1 day before surgery, Disp: 5 mL, Rfl: 2    multivitamin capsule, Take by mouth., Disp: , Rfl:     ofloxacin (Ocuflox) 0.3 % ophthalmic solution, 1 drop to operative eye 4 times a day starting 1 day before surgery, Disp: 5 mL, Rfl: 2    oxybutynin XL (Ditropan-XL) 10 mg 24 hr tablet, Take 1 tablet (10 mg) by mouth once daily. Do not crush, chew, or split., " Disp: , Rfl:     prednisoLONE acetate (Pred-Forte) 1 % ophthalmic suspension, 1 drop to operative eye 4 times a day starting the day of surgery (after surgery is done), Disp: 5 mL, Rfl: 2    sertraline (Zoloft) 100 mg tablet, Take 1 tablet (100 mg) by mouth once daily., Disp: 90 tablet, Rfl: 3    sertraline (Zoloft) 50 mg tablet, Take 1 tablet (50 mg) by mouth once daily., Disp: 90 tablet, Rfl: 3    amLODIPine (Norvasc) 2.5 mg tablet, Take 1 tablet (2.5 mg) by mouth once daily., Disp: 30 tablet, Rfl: 0    atorvastatin (Lipitor) 40 mg tablet, Take 1 tablet (40 mg) by mouth once daily., Disp: 30 tablet, Rfl: 0    hydrocortisone 2.5 % cream, Apply twice daily to affected areas of body (avoid face) for 1-2 weeks as directed, Disp: 30 g, Rfl: 1     Objective   Well appearing patient in no apparent distress; mood and affect are within normal limits.    A full examination was performed including scalp, face, eyes, ears, nose, lips, neck, chest, axillae, abdomen, back, bilateral upper extremities, and bilateral lower extremities. All findings within normal limits unless otherwise noted below.    Assessment/Plan   1. Neoplasm of uncertain behavior of skin (2)  Right Superior Nasal Sidewall  6 mm erythematous, scaly papule           Shave removal    Lesion length (cm):  0.6  Margin per side (cm):  0  Lesion diameter (cm):  0.6  Informed consent: discussed and consent obtained    Timeout: patient name, date of birth, surgical site, and procedure verified    Procedure prep:  Patient was prepped and draped  Anesthesia: the lesion was anesthetized in a standard fashion    Anesthetic:  1% lidocaine w/ epinephrine 1-100,000 local infiltration  Instrument used: flexible razor blade    Hemostasis achieved with: aluminum chloride    Outcome: patient tolerated procedure well    Post-procedure details: sterile dressing applied and wound care instructions given    Dressing type: bandage and petrolatum      Specimen 1 - Dermatopathology-  DERM LAB  Differential Diagnosis: ISK v SCC  Check Margins Yes/No?:    Comments:    Dermpath Lab: Routine Histopathology (formalin-fixed tissue)    Mid-Upper Back  8 mm dark brown pigmented, asymmetric macule with an asymmetric pigment network and irregular borders           Shave removal    Lesion length (cm):  0.8  Margin per side (cm):  0.2  Lesion diameter (cm):  1.2  Informed consent: discussed and consent obtained    Timeout: patient name, date of birth, surgical site, and procedure verified    Procedure prep:  Patient was prepped and draped  Anesthesia: the lesion was anesthetized in a standard fashion    Anesthetic:  1% lidocaine w/ epinephrine 1-100,000 local infiltration  Instrument used: flexible razor blade    Hemostasis achieved with: aluminum chloride    Outcome: patient tolerated procedure well    Post-procedure details: sterile dressing applied and wound care instructions given    Dressing type: bandage and petrolatum      Staff Communication: Dermatology Local Anesthesia: Other - Drug:  normal saline.  Amount:0.5ml    Specimen 2 - Dermatopathology- DERM LAB  Differential Diagnosis: DN v SK  Check Margins Yes/No?:    Comments:    Dermpath Lab: Routine Histopathology (formalin-fixed tissue)    2. Melanocytic nevus of trunk  Scattered on the patient's face, neck, trunk, and extremities, there are multiple small, round- to oval-shaped, brown-pigmented and pink-colored, symmetric, uniform-appearing macules and dome-shaped papules    Clinically benign- to slightly atypical-appearing nevi - the clinically benign- to slightly atypical-appearing nature of the remainder of the patient's nevi was discussed with the patient today.  None of the patient's nevi, with the exception of the one noted above, meet threshold for biopsy today.  I emphasized the importance of performing monthly self-skin exams using the ABCDs of monitoring moles, which were reviewed with the patient today and an informational hand-out  provided.  I also emphasized the importance of sun avoidance and sun protection with daily sunscreen use.    3. Seborrheic keratosis  Scattered on the patient's face, neck, trunk, and extremities, there are multiple tan- to light brown-colored, hyperkeratotic, stuck-on appearing papules of varying size and shape    Seborrheic Keratoses - the benign nature of these lesions was discussed with the patient today and reassurance provided.  No treatment is medically indicated for these lesions at this time.    4. Hemangioma of skin  Scattered on the patient's face, neck, trunk, and extremities, there are multiple small, round, cherry red- to purplish-colored, symmetric, uniform, vascular-appearing macules and papules    Cherry Angiomas - the benign nature of these vascular lesions was discussed with the patient today and reassurance provided.  No treatment is medically indicated for these lesions at this time.    5. Bitten or stung by nonvenomous insect and other nonvenomous arthropods, initial encounter  Right Forearm - Anterior  Scattered on her bilateral upper and lower extremities, there are several similar-appearing pink to erythematous, urticarial appearing papules with excoriations    Arthropod bites - extremities.  The benign nature of these lesions and treatment options were discussed extensively with the patient in the office today.  At this time, I recommend topical steroid therapy with Hydrocortisone 2.5% cream, which the patient was instructed to apply twice daily to the affected areas of her extremities (avoid face, groin, body folds) for the next 1-2 weeks.  The risks, benefits, and side effects of this medication, including possible skin atrophy with overuse of topical steroids, were discussed.  The patient expressed understanding and is in agreement with this plan.    hydrocortisone 2.5 % cream - Right Forearm - Anterior  Apply twice daily to affected areas of body (avoid face) for 1-2 weeks as  directed    6. Diffuse photodamage of skin  Photodistributed  Diffuse photodamage with actinic changes with telangiectasia and mottled pigmentation in sun-exposed areas.    Photodamage.  The signs and symptoms of skin cancer were reviewed and the patient was advised to practice sun protection and sun avoidance, use daily sunscreen, and perform regular self skin exams.  Sun protection was discussed, including avoiding the mid-day sun, wearing a sunscreen with SPF at least 50, and stressing the need for reapplication of sunscreen and applying more than they think they need.

## 2024-09-04 ENCOUNTER — APPOINTMENT (OUTPATIENT)
Dept: UROLOGY | Facility: CLINIC | Age: 84
End: 2024-09-04
Payer: MEDICARE

## 2024-09-04 LAB
LABORATORY COMMENT REPORT: NORMAL
PATH REPORT.FINAL DX SPEC: NORMAL
PATH REPORT.GROSS SPEC: NORMAL
PATH REPORT.MICROSCOPIC SPEC OTHER STN: NORMAL
PATH REPORT.RELEVANT HX SPEC: NORMAL
PATH REPORT.TOTAL CANCER: NORMAL

## 2024-09-05 ENCOUNTER — APPOINTMENT (OUTPATIENT)
Dept: UROLOGY | Facility: HOSPITAL | Age: 84
End: 2024-09-05
Payer: MEDICARE

## 2024-09-05 DIAGNOSIS — N39.41 URGE INCONTINENCE OF URINE: Primary | ICD-10-CM

## 2024-09-05 PROCEDURE — 99214 OFFICE O/P EST MOD 30 MIN: CPT | Performed by: STUDENT IN AN ORGANIZED HEALTH CARE EDUCATION/TRAINING PROGRAM

## 2024-09-05 PROCEDURE — 1157F ADVNC CARE PLAN IN RCRD: CPT | Performed by: STUDENT IN AN ORGANIZED HEALTH CARE EDUCATION/TRAINING PROGRAM

## 2024-09-05 PROCEDURE — 99204 OFFICE O/P NEW MOD 45 MIN: CPT | Performed by: STUDENT IN AN ORGANIZED HEALTH CARE EDUCATION/TRAINING PROGRAM

## 2024-09-05 NOTE — PROGRESS NOTES
Subjective   Patient ID: Noy Ochoa is a 84 y.o. female    \[facility] [place] [facility]\""  84 y.o. female with a history of recurrent UTIs, who presents today with complaints of urge incontinence kindly referred by Dr. Aren Bernstein.     She explains she has constant leaking and no control over her bladder. This began when she was hospitalized in 2023 with COVID. She does endorse nocturia. She has tried Myrbetriq in the past but did not respond well.     Review of Systems    All systems were reviewed. Anything negative was noted in the HPI.    Objective   Physical Exam    General: Well developed, well nourished, alert and cooperative, appears in no acute distress   Eyes: Non-injected conjunctiva, sclera clear, no proptosis   Cardiac: Extremities are warm and well perfused. No edema, cyanosis or pallor   Lungs: Breathing is easy, non-labored. Speaking in clear and complete sentences. Normal diaphragmatic movement   MSK: Ambulatory with steady gait, unassisted   Neuro: Alert and oriented to person, place, and time   Psych: Demonstrates good judgment and reason, without hallucinations, abnormal affect or abnormal behaviors   Skin: No obvious lesions, no rashes       No CVA tenderness bilaterally   No suprapubic pain or discomfort       Past Medical History:   Diagnosis Date    Anxiety     Combined forms of age-related cataract, left eye 09/06/2022    Combined form of age-related cataract, left eye    Combined forms of age-related cataract, right eye 09/06/2022    Combined form of age-related cataract, right eye    Depression, unspecified 02/23/2022    Depression    Hyperlipidemia     Hypertension     Macular drusen, bilateral     OAB (overactive bladder)     Urinary tract infection          Past Surgical History:   Procedure Laterality Date    CATARACT EXTRACTION W/  INTRAOCULAR LENS IMPLANT Left 05/02/2024    Dr. Michelle Byrnes, 5/30/24 OD    CATARACT EXTRACTION W/  INTRAOCULAR LENS IMPLANT Right 05/30/2024    Dr. Last     CHOLECYSTECTOMY  10/11/2013    Cholecystectomy    HYSTERECTOMY  10/11/2013    Hysterectomy    TONSILLECTOMY  10/11/2013    Tonsillectomy           Assessment/Plan   OAB    84 y.o. female who presents for the above condition, We had a very long and extensive discussion with the patient regarding the pathophysiology, differential diagnosis, risk factor, management, natural history, incidence and diagnostic work-up of the condition. We discussed first-line treatment of lifestyle modification, reducing caffeine and other bladder irritants. We discussed preventing constipation and timing her voids throughout the day. We discussed trial of Gemtesa 75 mg, explained. We discussed the risks of Gemtesa which include incomplete bladder emptying, sinus pain, dry mouth, sore throat, joint pain, diarrhea, constipation, bloating, and anxiety. If the cost of Gemtessa is prohibitive, they will let us know and we will try oxybutynin. We reviewed indication for use, potential common side effects, and instructions for use. We reviewed association of anti-cholinergic medication use with increased risk of developing dementia. Alternatives to anti-cholinergic medication and different anti-cholinergic medications were discussed, including theoretical decreased risk with certain anti-cholinergics, and offered as appropriate for the patient. The patient expressed her understanding of the counseling provided. Patient understands and desires to proceed.         Plan:  - Gemtessa trial 75 mg  - Follow up in 4 months         9/5/2024    Scribe Attestation  By signing my name below, Natalie JULES Scribe   attest that this documentation has been prepared under the direction and in the presence of Diaz Yan MD MPH.

## 2024-09-09 ENCOUNTER — DOCUMENTATION (OUTPATIENT)
Dept: PHYSICAL THERAPY | Facility: CLINIC | Age: 84
End: 2024-09-09
Payer: MEDICARE

## 2024-09-09 ENCOUNTER — TELEPHONE (OUTPATIENT)
Dept: DERMATOLOGY | Facility: CLINIC | Age: 84
End: 2024-09-09
Payer: MEDICARE

## 2024-09-09 NOTE — PROGRESS NOTES
Physical Therapy    Discharge Summary    Name: Noy Ochoa  MRN: [id number]  : 1940  Date: 24    Discharge Summary: PT    Discharge Information: Date of discharge 24    Rehab Discharge Reason: Failed to schedule and/or keep follow-up appointment(s)    Priscilla Bolden, PT

## 2024-09-09 NOTE — TELEPHONE ENCOUNTER
LEFT VOICEMAIL WITH PATIENT TO CALL BACK AND SCHEDULE APPOINTMENT WITH [facility] FOR [facility]

## 2024-09-11 ENCOUNTER — PATIENT MESSAGE (OUTPATIENT)
Dept: UROLOGY | Facility: HOSPITAL | Age: 84
End: 2024-09-11
Payer: MEDICARE

## 2024-09-13 ENCOUNTER — APPOINTMENT (OUTPATIENT)
Dept: ORTHOPEDIC SURGERY | Facility: HOSPITAL | Age: 84
End: 2024-09-13
Payer: MEDICARE

## 2024-09-13 DIAGNOSIS — M65.342 TRIGGER FINGER, LEFT RING FINGER: ICD-10-CM

## 2024-09-13 PROCEDURE — 99213 OFFICE O/P EST LOW 20 MIN: CPT | Performed by: ORTHOPAEDIC SURGERY

## 2024-09-13 NOTE — LETTER
September 29, 2024     Aren Bernstein MD  [address]  [facility], [facility] [address]  [facility] [address]    Patient: Noy Ochoa   YOB: 1940   Date of Visit: 9/13/2024       Dear Dr. Aren Bernstein MD:    Thank you for referring Noy Ochoa to me for evaluation. Below are my notes for this consultation.  If you have questions, please do not hesitate to call me. I look forward to following your patient along with you.       Sincerely,     Duane Mitchell MD      CC: No Recipients  ______________________________________________________________________________________    CHIEF COMPLAINT         Finger stiffness    ASSESSMENT + PLAN    Left ring trigger finger, locked in flexion    The nature of trigger finger was reviewed, along with the natural history.  I reviewed the options for management, including observation, nonsteroidals, splinting, oral steroids, cortisone injection, or surgical release, along with the likely success rates of each.      I reviewed the risks of oral anti-inflammatories, including GI upset or even GI bleeding, and the patient did not want to run that risk.    I reviewed the technique of using a stack of Band-Aids over the volar skin crease on the PIP joint as a splint.  This should help decrease triggering events, and does have some utility at eliminating the symptoms altogether.    Patient will followup after 4 weeks if still symptomatic, or with any concerns.  I discussed that I do not expect much improvement from the therapy and that I would suggest going ahead with trigger release surgery to restore extension to the digit.  She will contact the office if she decides to go that route.  Surgery would be under sedation at the location of her convenience.        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   This has been down since May.  No single specific recalled trauma around time of onset.  She does report a little prodromal popping and catching with progression to being stuck in flexion.  No numbness or tingling.  No similar problems in other digits.  No particular treatment so far for this.    She is not diabetic or hypothyroid.  She does not smoke.      REVIEW OF SYSTEMS       A 30-item multi-system Review Of Systems was obtained on today's intake form.  This was reviewed with the patient and is correct.  The pertinent positives and negatives are listed above.  The form has been scanned separately into the medical record.      PHYSICAL EXAM    Constitutional:    Appears stated age. Well-developed and well-nourished overweight female in no acute distress.  Psychiatric:         Pleasant normal mood and affect. Behavior is appropriate for the situation.   Head:                   Normocephalic and atraumatic.  Eyes:                    Pupils are equal and round.  Cardiovascular:  2+ radial and ulnar pulses. Fingers well-perfused.  Respiratory:        Effort normal. No respiratory distress. Speaking in complete sentences.  Neurologic:       Alert and oriented to person, place, and time.  Skin:                Skin is intact, warm and dry.  Hematologic / Lymphatic:    No lymphedema or lymphangitis.    Extremities / Musculoskeletal:                      Skin of the left hand and wrist is intact with no erythema, ecchymosis, or diffuse swelling.  Full composite finger flexion extension with full intrinsic plus and minus posture except left ring finger which is stuck in significant composite flexion.  There is a palpable flexor nodule and A1 tenderness only of the left ring finger.  It does not extend enough to trigger.  Tendons are intact individual testing.  There is no palmar thickening to suggest Dupuytren's disease.  Symmetric wrist and forearm motion.  Normal rotation with no scissoring.  Stable collaterals.   Sensation intact to light touch in all distributions.  Capillary refill less than 2 seconds.      IMAGING / LABS / EMGs           None pertinent      Past Medical History:   Diagnosis Date   • Anxiety    • Combined forms of age-related cataract, left eye 2022    Combined form of age-related cataract, left eye   • Combined forms of age-related cataract, right eye 2022    Combined form of age-related cataract, right eye   • Depression, unspecified 2022    Depression   • Hyperlipidemia    • Hypertension    • Macular drusen, bilateral    • OAB (overactive bladder)    • Urinary tract infection        Medication Documentation Review Audit       Reviewed by Jayne Bueno LPN (Licensed Nurse) on 24 at 1033      Medication Order Taking? Sig Documenting Provider Last Dose Status   alendronate (Fosamax) 70 mg tablet [id number] Yes Take 1 tablet (70 mg) by mouth every 7 days. Aren Bernstein MD Taking Active   amLODIPine (Norvasc) 2.5 mg tablet [id number]  Take 1 tablet (2.5 mg) by mouth once daily. Aren Bernstein MD   24 2359   ascorbic acid, vitamin C, 500 mg capsule [id number] Yes Take 500 mg by mouth. Historical Provider, MD Taking Active   atorvastatin (Lipitor) 40 mg tablet [id number]  Take 1 tablet (40 mg) by mouth once daily. Aren Bernstein MD   24 2359   calcium carbonate (Oscal) 500 mg calcium (1,250 mg) tablet [id number] Yes Take 1 tablet (1,250 mg) by mouth once daily. Historical Provider, MD Taking Active   cholecalciferol (Vitamin D-3) 125 MCG (5000 UT) capsule [id number] Yes Take 1 capsule (125 mcg) by mouth. Historical Provider, MD Taking Active   ketorolac (Acular) 0.5 % ophthalmic solution [id number] Yes 1 drop to surgical eye 4 times a day starting 1 day before surgery Michelle Byrnes MD Taking Active   multivitamin capsule [id number] Yes Take by mouth. Wolfgang Provider, MD Taking Active   ofloxacin (Ocuflox) 0.3 % ophthalmic solution [id number] Yes 1  "drop to operative eye 4 times a day starting 1 day before surgery Michelle Byrnes MD Taking Active   oxybutynin XL (Ditropan-XL) 10 mg 24 hr tablet [id number] Yes Take 1 tablet (10 mg) by mouth once daily. Do not crush, chew, or split. Historical Provider, MD Taking Active   prednisoLONE acetate (Pred-Forte) 1 % ophthalmic suspension [id number] Yes 1 drop to operative eye 4 times a day starting the day of surgery (after surgery is done) Michelle Byrnes MD Taking Active   sertraline (Zoloft) 100 mg tablet [id number] Yes Take 1 tablet (100 mg) by mouth once daily. Wesley Burr MD Taking Active   sertraline (Zoloft) 50 mg tablet [id number] Yes Take 1 tablet (50 mg) by mouth once daily. Wesley Burr MD Taking Active                    Allergies   Allergen Reactions   • Aspirin Swelling   • Food Extracts Swelling     Cod fish, david, onion and hopps   • David Unknown   • Latex Unknown   • Lidocaine Swelling       Social History     Socioeconomic History   • Marital status:      Spouse name: Not on file   • Number of children: Not on file   • Years of education: Not on file   • Highest education level: Not on file   Occupational History   • Not on file   Tobacco Use   • Smoking status: Never     Passive exposure: Never   • Smokeless tobacco: Never   • Tobacco comments:     Son denies any illness in the past 30 days.     Denies personal/family reaction or problems with anesthesia except stating is \"sensitive to anesthesia\".      Sob with stairs and activity     Uses a cane to ambulate.     Able to lay flat x 30 minute     Updated 5/20/24   Vaping Use   • Vaping status: Never Used   Substance and Sexual Activity   • Alcohol use: Not Currently   • Drug use: Never   • Sexual activity: Defer   Other Topics Concern   • Not on file   Social History Narrative   • Not on file     Social Determinants of Health     Financial Resource Strain: Not on file   Food Insecurity: Not on file   Transportation Needs: " Not on file   Physical Activity: Not on file   Stress: Not on file   Social Connections: Not on file   Intimate Partner Violence: Not on file   Housing Stability: Not on file       Past Surgical History:   Procedure Laterality Date   • CATARACT EXTRACTION W/  INTRAOCULAR LENS IMPLANT Left 05/02/2024    Dr. Michelle Byrnes, 5/30/24 OD   • CATARACT EXTRACTION W/  INTRAOCULAR LENS IMPLANT Right 05/30/2024    Dr. Last   • CHOLECYSTECTOMY  10/11/2013    Cholecystectomy   • HYSTERECTOMY  10/11/2013    Hysterectomy   • TONSILLECTOMY  10/11/2013    Tonsillectomy         Electronically Signed      MOER Mitchell MD      Orthopaedic Hand Surgery      [phone]

## 2024-09-13 NOTE — PROGRESS NOTES
CHIEF COMPLAINT         Finger stiffness    ASSESSMENT + PLAN    Left ring trigger finger, locked in flexion    The nature of trigger finger was reviewed, along with the natural history.  I reviewed the options for management, including observation, nonsteroidals, splinting, oral steroids, cortisone injection, or surgical release, along with the likely success rates of each.      I reviewed the risks of oral anti-inflammatories, including GI upset or even GI bleeding, and the patient did not want to run that risk.    I reviewed the technique of using a stack of Band-Aids over the volar skin crease on the PIP joint as a splint.  This should help decrease triggering events, and does have some utility at eliminating the symptoms altogether.    Patient will followup after 4 weeks if still symptomatic, or with any concerns.  I discussed that I do not expect much improvement from the therapy and that I would suggest going ahead with trigger release surgery to restore extension to the digit.  She will contact the office if she decides to go that route.  Surgery would be under sedation at the location of her convenience.        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  This has been down since May.  No single specific recalled trauma around time of onset.  She does report a little prodromal popping and catching with progression to being stuck in flexion.  No numbness or tingling.  No similar problems in other digits.  No particular treatment so far for this.    She is not diabetic or hypothyroid.  She does not smoke.      REVIEW OF SYSTEMS       A 30-item multi-system Review Of Systems was obtained on today's intake form.  This was reviewed with the patient and is correct.  The pertinent positives and negatives are listed above.  The form has been scanned separately into  the medical record.      PHYSICAL EXAM    Constitutional:    Appears stated age. Well-developed and well-nourished overweight female in no acute distress.  Psychiatric:         Pleasant normal mood and affect. Behavior is appropriate for the situation.   Head:                   Normocephalic and atraumatic.  Eyes:                    Pupils are equal and round.  Cardiovascular:  2+ radial and ulnar pulses. Fingers well-perfused.  Respiratory:        Effort normal. No respiratory distress. Speaking in complete sentences.  Neurologic:       Alert and oriented to person, place, and time.  Skin:                Skin is intact, warm and dry.  Hematologic / Lymphatic:    No lymphedema or lymphangitis.    Extremities / Musculoskeletal:                      Skin of the left hand and wrist is intact with no erythema, ecchymosis, or diffuse swelling.  Full composite finger flexion extension with full intrinsic plus and minus posture except left ring finger which is stuck in significant composite flexion.  There is a palpable flexor nodule and A1 tenderness only of the left ring finger.  It does not extend enough to trigger.  Tendons are intact individual testing.  There is no palmar thickening to suggest Dupuytren's disease.  Symmetric wrist and forearm motion.  Normal rotation with no scissoring.  Stable collaterals.  Sensation intact to light touch in all distributions.  Capillary refill less than 2 seconds.      IMAGING / LABS / EMGs           None pertinent      Past Medical History:   Diagnosis Date    Anxiety     Combined forms of age-related cataract, left eye 09/06/2022    Combined form of age-related cataract, left eye    Combined forms of age-related cataract, right eye 09/06/2022    Combined form of age-related cataract, right eye    Depression, unspecified 02/23/2022    Depression    Hyperlipidemia     Hypertension     Macular drusen, bilateral     OAB (overactive bladder)     Urinary tract infection         Medication Documentation Review Audit       Reviewed by Jayne Bueno LPN (Licensed Nurse) on 24 at 1033      Medication Order Taking? Sig Documenting Provider Last Dose Status   alendronate (Fosamax) 70 mg tablet [id number] Yes Take 1 tablet (70 mg) by mouth every 7 days. Aren Bernstein MD Taking Active   amLODIPine (Norvasc) 2.5 mg tablet [id number]  Take 1 tablet (2.5 mg) by mouth once daily. Aren Bernstein MD   24 2359   ascorbic acid, vitamin C, 500 mg capsule [id number] Yes Take 500 mg by mouth. Historical Provider, MD Taking Active   atorvastatin (Lipitor) 40 mg tablet [id number]  Take 1 tablet (40 mg) by mouth once daily. Aren Bernstein MD   24 2359   calcium carbonate (Oscal) 500 mg calcium (1,250 mg) tablet [id number] Yes Take 1 tablet (1,250 mg) by mouth once daily. Historical Provider, MD Taking Active   cholecalciferol (Vitamin D-3) 125 MCG (5000 UT) capsule [id number] Yes Take 1 capsule (125 mcg) by mouth. Historical Provider, MD Taking Active   ketorolac (Acular) 0.5 % ophthalmic solution [id number] Yes 1 drop to surgical eye 4 times a day starting 1 day before surgery Michelle Byrnes MD Taking Active   multivitamin capsule [id number] Yes Take by mouth. Historical Provider, MD Taking Active   ofloxacin (Ocuflox) 0.3 % ophthalmic solution [id number] Yes 1 drop to operative eye 4 times a day starting 1 day before surgery iMchelle Byrnes MD Taking Active   oxybutynin XL (Ditropan-XL) 10 mg 24 hr tablet [id number] Yes Take 1 tablet (10 mg) by mouth once daily. Do not crush, chew, or split. Historical Provider, MD Taking Active   prednisoLONE acetate (Pred-Forte) 1 % ophthalmic suspension [id number] Yes 1 drop to operative eye 4 times a day starting the day of surgery (after surgery is done) Michelle Byrnes MD Taking Active   sertraline (Zoloft) 100 mg tablet [id number] Yes Take 1 tablet (100 mg) by mouth once daily. Wesley Burr MD Taking Active  "  sertraline (Zoloft) 50 mg tablet [id number] Yes Take 1 tablet (50 mg) by mouth once daily. Wesley Burr MD Taking Active                    Allergies   Allergen Reactions    Aspirin Swelling    Food Extracts Swelling     Cod fish, david, onion and hopps    David Unknown    Latex Unknown    Lidocaine Swelling       Social History     Socioeconomic History    Marital status:      Spouse name: Not on file    Number of children: Not on file    Years of education: Not on file    Highest education level: Not on file   Occupational History    Not on file   Tobacco Use    Smoking status: Never     Passive exposure: Never    Smokeless tobacco: Never    Tobacco comments:     Son denies any illness in the past 30 days.     Denies personal/family reaction or problems with anesthesia except stating is \"sensitive to anesthesia\".      Sob with stairs and activity     Uses a cane to ambulate.     Able to lay flat x 30 minute     Updated 5/20/24   Vaping Use    Vaping status: Never Used   Substance and Sexual Activity    Alcohol use: Not Currently    Drug use: Never    Sexual activity: Defer   Other Topics Concern    Not on file   Social History Narrative    Not on file     Social Determinants of Health     Financial Resource Strain: Not on file   Food Insecurity: Not on file   Transportation Needs: Not on file   Physical Activity: Not on file   Stress: Not on file   Social Connections: Not on file   Intimate Partner Violence: Not on file   Housing Stability: Not on file       Past Surgical History:   Procedure Laterality Date    CATARACT EXTRACTION W/  INTRAOCULAR LENS IMPLANT Left 05/02/2024    Dr. Michelle Byrnes, 5/30/24 OD    CATARACT EXTRACTION W/  INTRAOCULAR LENS IMPLANT Right 05/30/2024    Dr. Last    CHOLECYSTECTOMY  10/11/2013    Cholecystectomy    HYSTERECTOMY  10/11/2013    Hysterectomy    TONSILLECTOMY  10/11/2013    Tonsillectomy         Electronically Signed      MORE Mitchell MD      " Orthopaedic Hand Surgery      [phone]

## 2024-09-26 ENCOUNTER — APPOINTMENT (OUTPATIENT)
Dept: DERMATOLOGY | Facility: CLINIC | Age: 84
End: 2024-09-26
Payer: MEDICARE

## 2024-09-26 DIAGNOSIS — L57.8 DIFFUSE PHOTODAMAGE OF SKIN: ICD-10-CM

## 2024-09-26 DIAGNOSIS — D48.5 NEOPLASM OF UNCERTAIN BEHAVIOR OF SKIN: Primary | ICD-10-CM

## 2024-09-26 PROCEDURE — 1157F ADVNC CARE PLAN IN RCRD: CPT | Performed by: DERMATOLOGY

## 2024-09-26 PROCEDURE — 11302 SHAVE SKIN LESION 1.1-2.0 CM: CPT | Performed by: DERMATOLOGY

## 2024-09-26 ASSESSMENT — DERMATOLOGY QUALITY OF LIFE (QOL) ASSESSMENT: ARE THERE EXCLUSIONS OR EXCEPTIONS FOR THE QUALITY OF LIFE ASSESSMENT: NO

## 2024-09-26 ASSESSMENT — DERMATOLOGY PATIENT ASSESSMENT
DO YOU USE SUNSCREEN: OCCASIONALLY
ARE YOU ON BIRTH CONTROL: NO
DO YOU USE A TANNING BED: NO
ARE YOU TRYING TO GET PREGNANT: NO
DO YOU HAVE ANY NEW OR CHANGING LESIONS: NO
DO YOU HAVE IRREGULAR MENSTRUAL CYCLES: NO

## 2024-09-26 NOTE — PROGRESS NOTES
"Subjective     Noy Ochoa is a 84 y.o. female who presents for the following: Discuss recent biopsy result and management options.  Biopsy of an atypical appearing pigmented lesion on her mid upper back performed at her last visit in our office on 8/30/2024 revealed a mildly dysplastic compound nevus.  Of note, biopsy of a second suspicious lesion on the right superior nasal sidewall also performed at that visit revealed a fibroepithelial polyp.    Today, the patient and her friend, Stefany, who accompanies her, state the biopsy sites healed well.  She denies any new, changing, or concerning skin lesions since her last visit; no bleeding, itching, or burning lesions.      Review of Systems:  No other skin or systemic complaints other than what is documented elsewhere in the note.    The following portions of the chart were reviewed this encounter and updated as appropriate:       Skin Cancer History  No skin cancer on file.    Specialty Problems    None      Past Dermatologic / Past Relevant Medical History:    - history of mildly dysplastic compound nevus on mid upper back diagnosed on 8/30/24 s/p re-shave on 9/26/24  - no history of skin cancer    Family History:    No family history of melanoma or skin cancer    Social History:    The patient is accompanied by her \"friend,\" Natalie again today    Allergies:  Aspirin, Food extracts, Violet, Latex, and Lidocaine    Current Medications / CAM's:    Current Outpatient Medications:     alendronate (Fosamax) 70 mg tablet, Take 1 tablet (70 mg) by mouth every 7 days., Disp: 12 tablet, Rfl: 0    amLODIPine (Norvasc) 2.5 mg tablet, Take 1 tablet (2.5 mg) by mouth once daily., Disp: 30 tablet, Rfl: 0    ascorbic acid, vitamin C, 500 mg capsule, Take 500 mg by mouth., Disp: , Rfl:     atorvastatin (Lipitor) 40 mg tablet, Take 1 tablet (40 mg) by mouth once daily., Disp: 30 tablet, Rfl: 0    calcium carbonate (Oscal) 500 mg calcium (1,250 mg) tablet, Take 1 tablet (1,250 " mg) by mouth once daily., Disp: , Rfl:     cholecalciferol (Vitamin D-3) 125 MCG (5000 UT) capsule, Take 1 capsule (125 mcg) by mouth., Disp: , Rfl:     hydrocortisone 2.5 % cream, Apply twice daily to affected areas of body (avoid face) for 1-2 weeks as directed, Disp: 30 g, Rfl: 1    ketorolac (Acular) 0.5 % ophthalmic solution, 1 drop to surgical eye 4 times a day starting 1 day before surgery, Disp: 5 mL, Rfl: 2    multivitamin capsule, Take by mouth., Disp: , Rfl:     ofloxacin (Ocuflox) 0.3 % ophthalmic solution, 1 drop to operative eye 4 times a day starting 1 day before surgery, Disp: 5 mL, Rfl: 2    oxybutynin XL (Ditropan-XL) 10 mg 24 hr tablet, Take 1 tablet (10 mg) by mouth once daily. Do not crush, chew, or split., Disp: , Rfl:     prednisoLONE acetate (Pred-Forte) 1 % ophthalmic suspension, 1 drop to operative eye 4 times a day starting the day of surgery (after surgery is done), Disp: 5 mL, Rfl: 2    sertraline (Zoloft) 100 mg tablet, Take 1 tablet (100 mg) by mouth once daily., Disp: 90 tablet, Rfl: 3    sertraline (Zoloft) 50 mg tablet, Take 1 tablet (50 mg) by mouth once daily., Disp: 90 tablet, Rfl: 3    vibegron 75 mg tablet, Take 1 tablet (75 mg) by mouth once daily., Disp: 30 tablet, Rfl: 3     Objective   Well appearing patient in no apparent distress; mood and affect are within normal limits.    A skin examination was performed including: Face, neck, and back. All findings within normal limits unless otherwise noted below.    Assessment/Plan   1. Neoplasm of uncertain behavior of skin  Mid-Upper Back  Pink scar at recent biopsy site    Shave removal    Lesion length (cm):  1.2  Margin per side (cm):  0  Lesion diameter (cm):  1.2  Informed consent: discussed and consent obtained    Timeout: patient name, date of birth, surgical site, and procedure verified    Procedure prep:  Patient was prepped and draped  Anesthesia: the lesion was anesthetized in a standard fashion    Anesthetic:   Preservative free saline  Instrument used: flexible razor blade    Hemostasis achieved with: aluminum chloride    Outcome: patient tolerated procedure well    Post-procedure details: sterile dressing applied and wound care instructions given    Dressing type: bandage and petrolatum      Specimen 1 - Dermatopathology- DERM LAB  Differential Diagnosis: DN; re-shave  Check Margins Yes/No?:    Comments:    Dermpath Lab: Routine Histopathology (formalin-fixed tissue)    Biopsy-proven mildly dysplastic compound nevus - mid upper back, present on the deep margin.  The atypical nature of this dysplastic nevus, its presence on the deep margin, and management options were discussed extensively with the patient in the office today.  Given the dysplastic nature of this nevus and its presence on the margin, I recommend re-excision of this lesion to ensure complete removal.  After discussing the risks and benefits of various options for excision, including horizontal removal via shave technique versus punch re-excision versus full-thickness surgical re-excision, the patient expressed understanding and wishes to undergo horizontal removal of this lesion via shave technique today.    2. Diffuse photodamage of skin  Photodistributed  Diffuse photodamage with actinic changes with telangiectasia and mottled pigmentation in sun-exposed areas.    History of dysplastic nevus and photodamage.  I emphasized the importance of continuing to perform monthly self-skin exams using the ABCDs of monitoring moles, which were reviewed with the patient, as well as the importance of sun avoidance and sun protection with daily sunscreen use.  I will have the patient return to our office in 1 year for routine follow-up and skin exam, and the patient was instructed to call our office should the patient notice any new, changing, symptomatic, or otherwise concerning skin lesions before then.  The patient expressed understanding and is in agreement with  this plan.

## 2024-09-28 PROBLEM — M65.342 TRIGGER FINGER, LEFT RING FINGER: Status: ACTIVE | Noted: 2024-09-28

## 2024-10-01 ENCOUNTER — OFFICE VISIT (OUTPATIENT)
Dept: CARDIOLOGY | Facility: HOSPITAL | Age: 84
End: 2024-10-01
Payer: MEDICARE

## 2024-10-01 VITALS
SYSTOLIC BLOOD PRESSURE: 128 MMHG | OXYGEN SATURATION: 93 % | BODY MASS INDEX: 29.82 KG/M2 | HEART RATE: 90 BPM | WEIGHT: 168.3 LBS | DIASTOLIC BLOOD PRESSURE: 77 MMHG | HEIGHT: 63 IN

## 2024-10-01 DIAGNOSIS — E78.00 HYPERCHOLESTEROLEMIA: ICD-10-CM

## 2024-10-01 DIAGNOSIS — I44.7 LBBB (LEFT BUNDLE BRANCH BLOCK): ICD-10-CM

## 2024-10-01 DIAGNOSIS — I10 BENIGN ESSENTIAL HYPERTENSION: Primary | ICD-10-CM

## 2024-10-01 PROCEDURE — 1159F MED LIST DOCD IN RCRD: CPT | Performed by: INTERNAL MEDICINE

## 2024-10-01 PROCEDURE — G2211 COMPLEX E/M VISIT ADD ON: HCPCS | Performed by: INTERNAL MEDICINE

## 2024-10-01 PROCEDURE — 3074F SYST BP LT 130 MM HG: CPT | Performed by: INTERNAL MEDICINE

## 2024-10-01 PROCEDURE — 1157F ADVNC CARE PLAN IN RCRD: CPT | Performed by: INTERNAL MEDICINE

## 2024-10-01 PROCEDURE — 3078F DIAST BP <80 MM HG: CPT | Performed by: INTERNAL MEDICINE

## 2024-10-01 PROCEDURE — 1036F TOBACCO NON-USER: CPT | Performed by: INTERNAL MEDICINE

## 2024-10-01 PROCEDURE — 1160F RVW MEDS BY RX/DR IN RCRD: CPT | Performed by: INTERNAL MEDICINE

## 2024-10-01 PROCEDURE — 93005 ELECTROCARDIOGRAM TRACING: CPT | Performed by: INTERNAL MEDICINE

## 2024-10-01 PROCEDURE — 99214 OFFICE O/P EST MOD 30 MIN: CPT | Performed by: INTERNAL MEDICINE

## 2024-10-01 ASSESSMENT — ENCOUNTER SYMPTOMS
DEPRESSION: 0
OCCASIONAL FEELINGS OF UNSTEADINESS: 0
LOSS OF SENSATION IN FEET: 0

## 2024-10-01 NOTE — PROGRESS NOTES
Primary Care Physician: Aren Bernstein MD  Date of Visit: 10/01/2024  1:00 PM EDT  Location of visit: [facility]     Chief Complaint:   Chief Complaint   Patient presents with    Follow-up        HPI / Summary:   Noy Ochoa is a 84 y.o. female presents for followup.  She has no cardiac complaints.  She walks on a regular basis and is able to walk up and down stairs and do housework without chest pain or short of breath.  The patient denies chest pain, shortness of breath, palpitations, lightheadedness, syncope, orthopnea, paroxysmal nocturnal dyspnea, lower extremity edema, or bleeding problems.          Past Medical History:   Diagnosis Date    Anxiety     Combined forms of age-related cataract, left eye 09/06/2022    Combined form of age-related cataract, left eye    Combined forms of age-related cataract, right eye 09/06/2022    Combined form of age-related cataract, right eye    Depression, unspecified 02/23/2022    Depression    Hyperlipidemia     Hypertension     Macular drusen, bilateral     OAB (overactive bladder)     Urinary tract infection         Past Surgical History:   Procedure Laterality Date    CATARACT EXTRACTION W/  INTRAOCULAR LENS IMPLANT Left 05/02/2024    Dr. Michelle Byrnes, 5/30/24 OD    CATARACT EXTRACTION W/  INTRAOCULAR LENS IMPLANT Right 05/30/2024    Dr. Last    CHOLECYSTECTOMY  10/11/2013    Cholecystectomy    HYSTERECTOMY  10/11/2013    Hysterectomy    TONSILLECTOMY  10/11/2013    Tonsillectomy          Social History:   reports that she has never smoked. She has never been exposed to tobacco smoke. She has never used smokeless tobacco. She reports that she does not currently use alcohol. She reports that she does not use drugs.     Family History:  family history includes Colon cancer in her mother; Diabetes in her brother and mother; Heart attack in her brother; Hypertension in her mother; Krystal-Pick disease in her father; Stroke in her brother.     "  Allergies:  Allergies   Allergen Reactions    Aspirin Swelling    Food Extracts Swelling     Cod fish, violet, onion and hopps    Violet Unknown    Latex Unknown    Lidocaine Swelling       Outpatient Medications:  Current Outpatient Medications   Medication Instructions    alendronate (FOSAMAX) 70 mg, oral, Every 7 days    amLODIPine (NORVASC) 2.5 mg, oral, Daily    ascorbic acid (vitamin C) 500 mg, oral    atorvastatin (LIPITOR) 40 mg, oral, Daily    calcium carbonate (Oscal) 500 mg calcium (1,250 mg) tablet 1 tablet, oral, Daily    cholecalciferol (VITAMIN D-3) 125 mcg, oral    hydrocortisone 2.5 % cream Apply twice daily to affected areas of body (avoid face) for 1-2 weeks as directed    ketorolac (Acular) 0.5 % ophthalmic solution 1 drop to surgical eye 4 times a day starting 1 day before surgery    multivitamin capsule oral    ofloxacin (Ocuflox) 0.3 % ophthalmic solution 1 drop to operative eye 4 times a day starting 1 day before surgery    oxybutynin XL (DITROPAN-XL) 10 mg, oral, Daily, Do not crush, chew, or split.    prednisoLONE acetate (Pred-Forte) 1 % ophthalmic suspension 1 drop to operative eye 4 times a day starting the day of surgery (after surgery is done)    sertraline (ZOLOFT) 100 mg, oral, Daily    sertraline (ZOLOFT) 50 mg, oral, Daily    vibegron 75 mg, oral, Daily       Physical Exam:  Vitals:    10/01/24 1308   BP: 128/77   BP Location: Left arm   Patient Position: Sitting   BP Cuff Size: Adult   Pulse: 90   SpO2: 93%   Weight: 76.3 kg (168 lb 4.8 oz)   Height: 1.6 m (5' 3\")     Wt Readings from Last 5 Encounters:   10/01/24 76.3 kg (168 lb 4.8 oz)   06/19/24 77.7 kg (171 lb 6.4 oz)   05/30/24 77.5 kg (170 lb 13.7 oz)   05/02/24 77.8 kg (171 lb 8.3 oz)   02/28/24 76.7 kg (169 lb)     Body mass index is 29.81 kg/m².   General: Well-developed well-nourished in no acute distress  HEENT: Normocephalic atraumatic  Neck: Supple, JVP is normal negative hepatojugular reflux 2+ carotid pulses " "without bruit  Pulmonary: Normal respiratory effort, clear to auscultation  Cardiovascular: No right ventricular heave, normal S1 and S2, no murmurs rubs or gallops  Abdomen: Soft nontender nondistended  Extremities: Warm without edema 2+ radial pulses bilaterally   Neurologic: Alert   Psychiatric: Normal mood and affect     Last Labs:  CMP:  Recent Labs     01/08/24  1235 12/12/23  1558 06/23/23  1140 06/20/23  0736     --  137 138   K 4.4  --  4.2 4.4     --  102 102   CO2 31  --  24 29   ANIONGAP 12  --  15 11   BUN 19  --  15 15   CREATININE 0.76 0.89 0.73 0.74   EGFR 79 65  --   --    GLUCOSE 108*  --  115* 109*     Recent Labs     06/23/23  1140 06/20/23  0736 06/13/23  1844   ALBUMIN 3.8 3.4 3.9   ALKPHOS 59 45 48   ALT 45 35 41   AST 25 19 38   BILITOT 0.7 0.6 0.6     CBC:  Recent Labs     01/08/24  1235 06/23/23  1140 06/20/23  0736   WBC 8.6 14.1* 16.7*   HGB 12.8 14.1 11.6*   HCT 39.2 40.7 35.0*    354 379   MCV 90 91 90     COAG: No results for input(s): \"INR\", \"DDIMERVTE\" in the last 83327 hours.  HEME/ENDO:  Recent Labs     08/08/24  1224 01/08/24  1235 12/10/21  1239 08/20/19  1250 02/19/19  1233 03/26/18  1106 11/07/17  1048   IRONSAT  --  16*  --   --   --   --   --    TSH  --  3.55  --   --  2.24  --  2.31   HGBA1C 6.3* 6.1* 5.9*   < > 6.0   < > 6.4    < > = values in this interval not displayed.      CARDIAC:   Recent Labs     06/12/23  1842 06/12/23  1433   TROPHS 37* 40*     Recent Labs     01/08/24  1235 12/10/21  1239 05/03/21  1232 02/19/20  1214   CHOL 132 155 154 172   LDLF  --  91 90 100*   LDLCALC 62  --   --   --    HDL 45.9 42.0 43.1 46.7   TRIG 122 112 104 128       Last Cardiology Tests:  ECG:  Electrocardiogram performed today that I reviewed shows normal sinus rhythm with nonspecific intraventricular conduction delay.    Echo:  Echocardiogram August 2, 2022  CONCLUSIONS:   1. The left ventricular systolic function is low normal with a 50-55% estimated ejection " fraction.   2. Slightly elevated RVSP.       Cath:      Stress Test:  Cardiac MRI stress test November 21, 2022  Impression:     1. Regadenason stress perfusion imaging demonstrates no evidence of   inducible myocardial ischemia.       2. The left ventricle is normal in cavity size and wall thickness.   Global systolic function is borderline  reduced. The LV ejection fraction is 50%. There are no regional wall   motion abnormalities.     3. The right ventricle is normal in cavity size, wall thickness, and   systolic function.     4. Delayed enhancement imaging demonstrates no evidence of myocardial   infarction, scar or infiltrative  disease.          Cardiac Imaging:  CT angio chest for PE June 2023  VESSELS:  Normal caliber aorta with aortic atherosclerosis.  No   pulmonary embolism to the segmental level.      HEART: Normal in size. Coronary artery calcifications noted however   exam is not optimized for evaluation. No significant pericardial   effusion.   Impression   No acute pulmonary embolism to the segmental level.     Bilateral scattered ground-glass, interstitial and consolidative  opacities consistent with multifocal infection.     Small hiatal hernia and coronary artery calcifications.       Assessment/Plan   Diagnoses and all orders for this visit:  Benign essential hypertension  -     ECG 12 Lead  Hypercholesterolemia  LBBB (left bundle branch block)    In summary Ms. 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  She is asymptomatic from a cardiac perspective.  She appears euvolemic on exam.  Her blood pressure and lipids are at goal.  She should continue her current cardiovascular medications.  We will see her back in follow-up in 1 year.      Orders:  Orders Placed This Encounter   Procedures     ECG 12 Lead      Followup Appts:  Future Appointments   Date Time Provider Department Center   10/2/2024  1:40 PM TIFFANY Espinal-CNP [id number] [facility]   1/7/2025  2:30 PM [facility]   2/5/2025  2:00 PM Wesley Burr MD [id number] [facility]   2/10/2025 11:00 AM Aren Bernstein MD [id number] [facility]   2/25/2025 11:00 AM Michelle Byrnes MD [id number] [facility]           ____________________________________________________________  MD [facility]  [facility]

## 2024-10-02 ENCOUNTER — APPOINTMENT (OUTPATIENT)
Dept: UROLOGY | Facility: CLINIC | Age: 84
End: 2024-10-02
Payer: MEDICARE

## 2024-10-02 DIAGNOSIS — R35.0 URINARY FREQUENCY: Primary | ICD-10-CM

## 2024-10-02 DIAGNOSIS — N39.41 URGE INCONTINENCE OF URINE: ICD-10-CM

## 2024-10-02 LAB
ATRIAL RATE: 90 BPM
P AXIS: 54 DEGREES
P OFFSET: 195 MS
P ONSET: 145 MS
PR INTERVAL: 138 MS
Q ONSET: 214 MS
QRS COUNT: 15 BEATS
QRS DURATION: 138 MS
QT INTERVAL: 412 MS
QTC CALCULATION(BAZETT): 504 MS
QTC FREDERICIA: 471 MS
R AXIS: 81 DEGREES
T AXIS: 49 DEGREES
T OFFSET: 420 MS
VENTRICULAR RATE: 90 BPM

## 2024-10-02 PROCEDURE — 1157F ADVNC CARE PLAN IN RCRD: CPT | Performed by: NURSE PRACTITIONER

## 2024-10-02 PROCEDURE — 99213 OFFICE O/P EST LOW 20 MIN: CPT | Performed by: NURSE PRACTITIONER

## 2024-10-02 NOTE — PROGRESS NOTES
Subjective   Patient ID: Noy Ochoa is a 84 y.o. female for FUV     HPI  Previously seen by Olga Mendez CNP. Last visit [facility] Dr. Ta 9/5/2024.   History of mild dementia. Presents today with her son for FUV. Reports urinary incontinence and NTF x q 2 hrs. Was taking oxybutynin for years, stopped due to concern for confusion. Trial of Trospium with allergic response (rash). Minimal sensation of need to urinate before incontinence, No improvement with Myrbetriq. Last visit Dr. Ta recommended trial of Gemtessa 75 mg daily. No hx CARLTON  Using a brief during the day and a pad + brief at night. Changing brief/pad 2-3 times per day. PMH HTN, MCI, PSH hysterectomy, No Hx stones    01/08/2024 CT Urography w 3D demonstrates   1. Normal CT appearance of the urinary tract system as above.  Specifically, no evidence for hydronephrosis, hydroureter, urinary  tract stones or perinephric stranding/masses identified.  2. Cholecystectomy and hysterectomy changes.  3. Mild circumferential mucosal wall thickening involving a short  segment of ascending colon proximally which may be related to  incomplete colonic distention. Correlation with colonoscopy findings  recommended.  4. Colonic diverticulosis but no CT evidence for acute diverticulitis.  5. Degenerative changes of the spine. No destructive bone lesions.  6. Additional detailed findings as above.    Past Medical History:   Diagnosis Date    Anxiety     Combined forms of age-related cataract, left eye 09/06/2022    Combined form of age-related cataract, left eye    Combined forms of age-related cataract, right eye 09/06/2022    Combined form of age-related cataract, right eye    Depression, unspecified 02/23/2022    Depression    Hyperlipidemia     Hypertension     Macular drusen, bilateral     OAB (overactive bladder)     Urinary tract infection          Lab Results   Component Value Date    URINECULTURE >100,000 Klebsiella pneumoniae/variicola (A) 02/08/2024     URINECULTURE >100,000 Escherichia coli (A) 12/11/2023    URINECULTURE >100,000 Klebsiella pneumoniae/variicola (A) 10/19/2023      Review of Systems  All other systems have been reviewed and are negative for complaint.    Objective   Physical Exam  General: Well developed, well nourished, alert and cooperative, appears in no acute distress  Eyes: Non-injected conjunctiva, sclera clear, no proptosis  Cardiac: Extremities are warm and well perfused. No edema, cyanosis or pallor.   Lungs: Breathing is easy, non-labored. Speaking in clear and complete sentences. Normal diaphragmatic movement.  MSK: Ambulatory with steady gait, unassisted  Neuro: alert and oriented to person, place and time  Psych: Demonstrates good judgement and reason, without hallucinations, abnormal affect or abnormal behaviors.  Skin: no obvious lesions, no rashes.    Assessment/Plan   There are no diagnoses linked to this encounter.     Urinary frequency, urgency, and incontinence. Failed Trial Oxybutynin, Trospium, and Myrbetriq.     Start Gemtessa 75 mg. Use and indication of this medication reviewed with patient and they wish to proceed. We discussed the risks of Gemtessa to include constipation, diarrhea, nausea, xerostomia, urinary retention, increased PVR, headache, and upper respiratory infection. Patient understands and desires to proceed.    Patient went to Pelvic Floor Physical Therapy x 2, does not feel it was helpful. We discussed UDS, Botox,   Interstim, and PTNS therapy. She does not want to proceed with further intervention at this time.   She will consider at next visit     Patient will follow up in 4 weeks for medication response, sooner if needed.     All questions and concerns were addressed. Patient verbalizes understanding and has no other questions at this time.   Jessica Whalen-- JEMAL ALLEN  Office Phone:  [phone]       .

## 2024-10-10 ENCOUNTER — DOCUMENTATION (OUTPATIENT)
Dept: NEUROLOGY | Facility: CLINIC | Age: 84
End: 2024-10-10
Payer: MEDICARE

## 2024-10-10 NOTE — PROGRESS NOTES
Patient had OT driving eval on 6/1/2024 which recommended return to independent driving during low traffic volumes and to places she frequents in her home area, refrain from highway driving, repeat driving eval in 6-9 months.  Full report will be scanned in EPIC.

## 2024-10-20 DIAGNOSIS — K21.9 GASTROESOPHAGEAL REFLUX DISEASE WITHOUT ESOPHAGITIS: ICD-10-CM

## 2024-10-22 RX ORDER — ALENDRONATE SODIUM 70 MG/1
70 TABLET ORAL
Qty: 12 TABLET | Refills: 0 | Status: SHIPPED | OUTPATIENT
Start: 2024-10-22

## 2024-10-30 ENCOUNTER — APPOINTMENT (OUTPATIENT)
Dept: UROLOGY | Facility: CLINIC | Age: 84
End: 2024-10-30
Payer: MEDICARE

## 2024-10-30 VITALS — TEMPERATURE: 97.7 F | HEIGHT: 63 IN | BODY MASS INDEX: 29.81 KG/M2

## 2024-10-30 DIAGNOSIS — R35.1 NOCTURIA: ICD-10-CM

## 2024-10-30 DIAGNOSIS — N39.0 RECURRENT UTI: ICD-10-CM

## 2024-10-30 DIAGNOSIS — R35.0 URINARY FREQUENCY: Primary | ICD-10-CM

## 2024-10-30 DIAGNOSIS — N39.41 URGE INCONTINENCE OF URINE: ICD-10-CM

## 2024-10-30 DIAGNOSIS — N95.8 GENITOURINARY SYNDROME OF MENOPAUSE: ICD-10-CM

## 2024-10-30 PROCEDURE — 1157F ADVNC CARE PLAN IN RCRD: CPT | Performed by: PHYSICIAN ASSISTANT

## 2024-10-30 PROCEDURE — 1160F RVW MEDS BY RX/DR IN RCRD: CPT | Performed by: PHYSICIAN ASSISTANT

## 2024-10-30 PROCEDURE — 51798 US URINE CAPACITY MEASURE: CPT | Performed by: PHYSICIAN ASSISTANT

## 2024-10-30 PROCEDURE — 99214 OFFICE O/P EST MOD 30 MIN: CPT | Performed by: PHYSICIAN ASSISTANT

## 2024-10-30 PROCEDURE — 1126F AMNT PAIN NOTED NONE PRSNT: CPT | Performed by: PHYSICIAN ASSISTANT

## 2024-10-30 PROCEDURE — 1036F TOBACCO NON-USER: CPT | Performed by: PHYSICIAN ASSISTANT

## 2024-10-30 PROCEDURE — 1159F MED LIST DOCD IN RCRD: CPT | Performed by: PHYSICIAN ASSISTANT

## 2024-10-30 RX ORDER — ESTRADIOL 0.1 MG/G
CREAM VAGINAL
Qty: 42.5 G | Refills: 2 | Status: SHIPPED | OUTPATIENT
Start: 2024-10-30

## 2024-10-30 ASSESSMENT — ENCOUNTER SYMPTOMS
DEPRESSION: 0
LOSS OF SENSATION IN FEET: 0
OCCASIONAL FEELINGS OF UNSTEADINESS: 1

## 2024-10-30 ASSESSMENT — PAIN SCALES - GENERAL: PAINLEVEL_OUTOF10: 0-NO PAIN

## 2024-11-12 DIAGNOSIS — I10 BENIGN ESSENTIAL HYPERTENSION: ICD-10-CM

## 2024-11-14 RX ORDER — ATORVASTATIN CALCIUM 40 MG/1
40 TABLET, FILM COATED ORAL DAILY
Qty: 30 TABLET | Refills: 0 | Status: SHIPPED | OUTPATIENT
Start: 2024-11-14 | End: 2024-12-14

## 2024-12-04 ENCOUNTER — APPOINTMENT (OUTPATIENT)
Dept: UROLOGY | Facility: CLINIC | Age: 84
End: 2024-12-04
Payer: MEDICARE

## 2024-12-04 ENCOUNTER — PROCEDURE VISIT (OUTPATIENT)
Dept: UROLOGY | Facility: CLINIC | Age: 84
End: 2024-12-04
Payer: MEDICARE

## 2024-12-04 VITALS
SYSTOLIC BLOOD PRESSURE: 170 MMHG | HEIGHT: 62 IN | BODY MASS INDEX: 31.65 KG/M2 | WEIGHT: 172 LBS | DIASTOLIC BLOOD PRESSURE: 79 MMHG | TEMPERATURE: 97.2 F | HEART RATE: 109 BPM

## 2024-12-04 DIAGNOSIS — R35.0 URINARY FREQUENCY: Primary | ICD-10-CM

## 2024-12-04 DIAGNOSIS — N39.41 URGE INCONTINENCE OF URINE: ICD-10-CM

## 2024-12-04 DIAGNOSIS — R35.1 NOCTURIA: ICD-10-CM

## 2024-12-04 PROCEDURE — 64566 NEUROELTRD STIM POST TIBIAL: CPT | Performed by: PHYSICIAN ASSISTANT

## 2024-12-04 ASSESSMENT — PAIN SCALES - GENERAL: PAINLEVEL_OUTOF10: 0-NO PAIN

## 2024-12-04 NOTE — PROGRESS NOTES
[facility] [place]  Outpatient Clinic Note    Chief Complaint   Patient presents with    Urinary Frequency     Referring provider: No referring provider defined for this encounter.     Subjective   Noy Ochoa is a 84 y.o. female presenting for PTNS.     History of Present Illness:  Previous urologic workup:  > CT urogram 1/8/2024 for recurrent UTI: No stones, hydro or renal mass, bladder within normal limits  > PVR 10/2/24: 6mL     Previous urologic therapy:  > PFPT x 2, not helpful  > oxybutynin for years, discontinued due to concern for confusion  > trospium, discontinued due to development of rash  > Myrbetriq - inadequate symptom response  > Gemtesa 10/2/24 - 10/30/24, inadequate symptom improvement  > Vaginal estrogen cream, has not  yet started    Patient voiding every 2-3 hours during the day. Urge incontinence multiple times per day. Nocturia 2-3x/night.     Patient confirmed she does not have a pacemaker.     Her Pmhx, surgical history, meds, allergies were reviewed and updated as needed.     10/30/24: Today she reports minimal improvement with Gemtesa. Continues to be bothered by urinary frequency, urgency and urge incontinence. Most bothered  by frequency. Voiding every 2 hours during the day. Nocturia 3-4x/night. Nocturia is not bothersome to her because she is able to go right back to sleep.   She denies dysuria, fevers, chills, nausea or vomiting. Denies hematuria. Denies vaginal bleeding. Not using vaginal estrogen, not sure she was ever prescribed this. Asymptomatic in terms of UTI.     Past Medical History and Surgical History:  Past Medical History:   Diagnosis Date    Anxiety     Combined forms of age-related cataract, left eye 09/06/2022    Combined form of age-related cataract, left eye    Combined forms of age-related cataract, right eye 09/06/2022    Combined form of age-related cataract, right eye    Depression, unspecified 02/23/2022    Depression    Hyperlipidemia     Hypertension      Macular drusen, bilateral     OAB (overactive bladder)     Urinary tract infection        Past Surgical History:   Procedure Laterality Date    CATARACT EXTRACTION W/  INTRAOCULAR LENS IMPLANT Left 05/02/2024    Dr. Michelle Byrnes, 5/30/24 OD    CATARACT EXTRACTION W/  INTRAOCULAR LENS IMPLANT Right 05/30/2024    Dr. Last    CHOLECYSTECTOMY  10/11/2013    Cholecystectomy    HYSTERECTOMY  10/11/2013    Hysterectomy    TONSILLECTOMY  10/11/2013    Tonsillectomy     Medications  Current Outpatient Medications on File Prior to Visit   Medication Sig Dispense Refill    alendronate (Fosamax) 70 mg tablet TAKE 1 TABLET BY MOUTH EVERY 7 DAYS 12 tablet 0    amLODIPine (Norvasc) 2.5 mg tablet Take 1 tablet (2.5 mg) by mouth once daily. 30 tablet 0    ascorbic acid, vitamin C, 500 mg capsule Take 500 mg by mouth.      atorvastatin (Lipitor) 40 mg tablet Take 1 tablet (40 mg) by mouth once daily. 30 tablet 0    calcium carbonate (Oscal) 500 mg calcium (1,250 mg) tablet Take 1 tablet (1,250 mg) by mouth once daily.      cholecalciferol (Vitamin D-3) 125 MCG (5000 UT) capsule Take 1 capsule (125 mcg) by mouth.      estradiol (Estrace) 0.01 % (0.1 mg/gram) vaginal cream Insert 1g vaginally three times per week at bedtime. 42.5 g 2    hydrocortisone 2.5 % cream Apply twice daily to affected areas of body (avoid face) for 1-2 weeks as directed 30 g 1    ketorolac (Acular) 0.5 % ophthalmic solution 1 drop to surgical eye 4 times a day starting 1 day before surgery 5 mL 2    multivitamin capsule Take by mouth.      ofloxacin (Ocuflox) 0.3 % ophthalmic solution 1 drop to operative eye 4 times a day starting 1 day before surgery 5 mL 2    prednisoLONE acetate (Pred-Forte) 1 % ophthalmic suspension 1 drop to operative eye 4 times a day starting the day of surgery (after surgery is done) 5 mL 2    sertraline (Zoloft) 100 mg tablet Take 1 tablet (100 mg) by mouth once daily. 90 tablet 3    sertraline (Zoloft) 50 mg tablet Take 1 tablet  (50 mg) by mouth once daily. 90 tablet 1     No current facility-administered medications on file prior to visit.     Allergies:  Allergies   Allergen Reactions    Aspirin Swelling    Food Extracts Swelling     Cod fish, violet, onion and hopps    Violet Unknown    Latex Unknown    Lidocaine Swelling     Objective     Physicial Exam  Vitals:    12/04/24 1404   BP: 170/79   Pulse: 109   Temp: 36.2 °C (97.2 °F)     Body mass index is 31.46 kg/m².    General: Well developed, well nourished, alert and cooperative, appears in no acute distress  Eyes: Non-injected conjunctiva, sclera clear, no proptosis  Cardiac: Extremities are warm and well perfused. No edema, cyanosis or pallor.   Lungs: Breathing is easy, non-labored. Speaking in clear and complete sentences. Normal diaphragmatic movement.  MSK: Ambulatory with steady gait, unassisted  Neuro: alert and oriented to person, place and time  Psych: Demonstrates good judgement and reason, without hallucinations, abnormal affect or abnormal behaviors.  Skin: no obvious lesions, no rashes.      Percutaneous Tibial Nerve Stimulation    Date/Time: 12/4/2024 2:21 PM    Performed by: Lo Ortega PA-C  Authorized by: Lo Ortega PA-C    Procedure discussed: discussed risks, benefits and alternatives      Procedure Details     Indications: urge incontinence, urinary frequency and urinary urgency      PTNS session #: 1    Bladder symptoms: unchanged      Ankle used: left      Stimulator intensity (mA): 6        Review of Systems  All other systems have been reviewed and are negative for complaint.    Assessment and Plan:  Noy Ochoa is a 84 y.o. female with urinary urgency, frequency, urge incontinence, nocturia. She presents for PTNS.     > PTNS #1 completed without difficultly. Patient tolerated procedure well.   > Return in 1 week for PTNS.     All questions and concerns were addressed. Patient verbalizes understanding and has no other questions at this time.      Lo Ortega PA-C  12/04/24 2:55 PM  Clinic phone: [phone], [phone]

## 2024-12-07 DIAGNOSIS — I10 BENIGN ESSENTIAL HYPERTENSION: ICD-10-CM

## 2024-12-11 ENCOUNTER — APPOINTMENT (OUTPATIENT)
Dept: UROLOGY | Facility: CLINIC | Age: 84
End: 2024-12-11
Payer: MEDICARE

## 2024-12-11 VITALS — TEMPERATURE: 97 F

## 2024-12-11 DIAGNOSIS — R39.15 URINARY URGENCY: ICD-10-CM

## 2024-12-11 DIAGNOSIS — R35.0 URINARY FREQUENCY: Primary | ICD-10-CM

## 2024-12-11 DIAGNOSIS — N39.41 URGE INCONTINENCE OF URINE: ICD-10-CM

## 2024-12-11 PROCEDURE — 64566 NEUROELTRD STIM POST TIBIAL: CPT | Performed by: PHYSICIAN ASSISTANT

## 2024-12-11 ASSESSMENT — PAIN SCALES - GENERAL: PAINLEVEL_OUTOF10: 0-NO PAIN

## 2024-12-11 NOTE — PROGRESS NOTES
[facility]  Outpatient Clinic Note    Chief Complaint   Patient presents with    Urinary Frequency     Referring provider: No referring provider defined for this encounter.     Subjective   Noy Ochoa is a 84 y.o. female presenting for PTNS.     History of Present Illness:  Previous urologic workup:  > CT urogram 1/8/2024 for recurrent UTI: No stones, hydro or renal mass, bladder within normal limits  > PVR 10/2/24: 6mL     Previous urologic therapy:  > PFPT x 2, not helpful  > oxybutynin for years, discontinued due to concern for confusion  > trospium, discontinued due to development of rash  > Myrbetriq - inadequate symptom response  > Gemtesa 10/2/24 - 10/30/24, inadequate symptom improvement  > Vaginal estrogen cream, has not  yet started    Patient reports she is doing well today. Urinary symptoms unchanged.     Baseline urinary symptoms, PTNS 1 12/4/24:  Patient voiding every 2-3 hours during the day. Urge incontinence multiple times per day. Nocturia 2-3x/night.   Patient confirmed she does not have a pacemaker.     10/30/24: Today she reports minimal improvement with Gemtesa. Continues to be bothered by urinary frequency, urgency and urge incontinence. Most bothered  by frequency. Voiding every 2 hours during the day. Nocturia 3-4x/night. Nocturia is not bothersome to her because she is able to go right back to sleep.   She denies dysuria, fevers, chills, nausea or vomiting. Denies hematuria. Denies vaginal bleeding. Not using vaginal estrogen, not sure she was ever prescribed this. Asymptomatic in terms of UTI.     Her Pmhx, surgical history, meds, allergies were reviewed and updated as needed.     Past Medical History and Surgical History:  Past Medical History:   Diagnosis Date    Anxiety     Combined forms of age-related cataract, left eye 09/06/2022    Combined form of age-related cataract, left eye    Combined forms of age-related cataract, right eye 09/06/2022    Combined form of  age-related cataract, right eye    Depression, unspecified 02/23/2022    Depression    Hyperlipidemia     Hypertension     Macular drusen, bilateral     OAB (overactive bladder)     Urinary tract infection        Past Surgical History:   Procedure Laterality Date    CATARACT EXTRACTION W/  INTRAOCULAR LENS IMPLANT Left 05/02/2024    Dr. Michelle Byrnes, 5/30/24 OD    CATARACT EXTRACTION W/  INTRAOCULAR LENS IMPLANT Right 05/30/2024    Dr. Last    CHOLECYSTECTOMY  10/11/2013    Cholecystectomy    HYSTERECTOMY  10/11/2013    Hysterectomy    TONSILLECTOMY  10/11/2013    Tonsillectomy     Medications  Current Outpatient Medications on File Prior to Visit   Medication Sig Dispense Refill    alendronate (Fosamax) 70 mg tablet TAKE 1 TABLET BY MOUTH EVERY 7 DAYS 12 tablet 0    amLODIPine (Norvasc) 2.5 mg tablet Take 1 tablet (2.5 mg) by mouth once daily. 30 tablet 0    ascorbic acid, vitamin C, 500 mg capsule Take 500 mg by mouth.      atorvastatin (Lipitor) 40 mg tablet Take 1 tablet (40 mg) by mouth once daily. 30 tablet 0    calcium carbonate (Oscal) 500 mg calcium (1,250 mg) tablet Take 1 tablet (1,250 mg) by mouth once daily.      cholecalciferol (Vitamin D-3) 125 MCG (5000 UT) capsule Take 1 capsule (125 mcg) by mouth.      estradiol (Estrace) 0.01 % (0.1 mg/gram) vaginal cream Insert 1g vaginally three times per week at bedtime. 42.5 g 2    hydrocortisone 2.5 % cream Apply twice daily to affected areas of body (avoid face) for 1-2 weeks as directed 30 g 1    ketorolac (Acular) 0.5 % ophthalmic solution 1 drop to surgical eye 4 times a day starting 1 day before surgery 5 mL 2    multivitamin capsule Take by mouth.      ofloxacin (Ocuflox) 0.3 % ophthalmic solution 1 drop to operative eye 4 times a day starting 1 day before surgery 5 mL 2    prednisoLONE acetate (Pred-Forte) 1 % ophthalmic suspension 1 drop to operative eye 4 times a day starting the day of surgery (after surgery is done) 5 mL 2    sertraline  (Zoloft) 100 mg tablet Take 1 tablet (100 mg) by mouth once daily. 90 tablet 3    sertraline (Zoloft) 50 mg tablet Take 1 tablet (50 mg) by mouth once daily. 90 tablet 1     No current facility-administered medications on file prior to visit.     Allergies:  Allergies   Allergen Reactions    Aspirin Swelling    Food Extracts Swelling     Cod fish, violet, onion and hopps    Violet Unknown    Latex Unknown    Lidocaine Swelling     Objective     Physicial Exam  Vitals:    12/11/24 1359   Temp: 36.1 °C (97 °F)     There is no height or weight on file to calculate BMI.    General: Well developed, well nourished, alert and cooperative, appears in no acute distress  Eyes: Non-injected conjunctiva, sclera clear, no proptosis  Cardiac: Extremities are warm and well perfused. No edema, cyanosis or pallor.   Lungs: Breathing is easy, non-labored. Speaking in clear and complete sentences. Normal diaphragmatic movement.  MSK: Ambulatory with steady gait, unassisted  Neuro: alert and oriented to person, place and time  Psych: Demonstrates good judgement and reason, without hallucinations, abnormal affect or abnormal behaviors.  Skin: no obvious lesions, no rashes.    Percutaneous Tibial Nerve Stimulation    Date/Time: 12/11/2024 3:01 PM    Performed by: Lo Ortega PA-C  Authorized by: Lo Ortega PA-C    Procedure discussed: discussed risks, benefits and alternatives      Procedure Details     Indications: urge incontinence, urinary frequency and urinary urgency      PTNS session #: 2    Bladder symptoms: unchanged      Ankle used: left      Stimulator intensity (mA): 3        Review of Systems  All other systems have been reviewed and are negative for complaint.    Assessment and Plan:  Noy Ochoa is a 84 y.o. female with urinary urgency, frequency, urge incontinence, nocturia. She presents for PTNS.     > PTNS completed without difficultly. Patient tolerated procedure well.   > Return in 1 week for PTNS.      All questions and concerns were addressed. Patient verbalizes understanding and has no other questions at this time.     Lo Ortega PA-C  12/11/24 3:01 PM  Clinic phone: [phone], [phone]

## 2024-12-12 RX ORDER — AMLODIPINE BESYLATE 2.5 MG/1
2.5 TABLET ORAL DAILY
Qty: 30 TABLET | Refills: 0 | Status: SHIPPED | OUTPATIENT
Start: 2024-12-12

## 2024-12-12 RX ORDER — ATORVASTATIN CALCIUM 40 MG/1
40 TABLET, FILM COATED ORAL DAILY
Qty: 30 TABLET | Refills: 0 | Status: SHIPPED | OUTPATIENT
Start: 2024-12-12

## 2024-12-18 ENCOUNTER — APPOINTMENT (OUTPATIENT)
Dept: UROLOGY | Facility: CLINIC | Age: 84
End: 2024-12-18
Payer: MEDICARE

## 2024-12-18 DIAGNOSIS — R39.15 URINARY URGENCY: ICD-10-CM

## 2024-12-18 DIAGNOSIS — N39.41 URGE INCONTINENCE OF URINE: ICD-10-CM

## 2024-12-18 DIAGNOSIS — R35.0 URINARY FREQUENCY: Primary | ICD-10-CM

## 2024-12-18 PROCEDURE — 64566 NEUROELTRD STIM POST TIBIAL: CPT | Performed by: PHYSICIAN ASSISTANT

## 2024-12-18 NOTE — PROGRESS NOTES
[facility]  Outpatient Clinic Note    No chief complaint on file.    Referring provider: No referring provider defined for this encounter.     Subjective   Noy Ochoa is a 84 y.o. female presenting for PTNS.     History of Present Illness:  Previous urologic workup:  > CT urogram 1/8/2024 for recurrent UTI: No stones, hydro or renal mass, bladder within normal limits  > PVR 10/2/24: 6mL     Previous urologic therapy:  > PFPT x 2, not helpful  > oxybutynin for years, discontinued due to concern for confusion  > trospium, discontinued due to development of rash  > Myrbetriq - inadequate symptom response  > Gemtesa 10/2/24 - 10/30/24, inadequate symptom improvement  > Vaginal estrogen cream    Patient reports she is doing well today. Urinary symptoms stable, unchanged. Continues to endorse urge incontinence.      Baseline urinary symptoms, PTNS 1 12/4/24:  Patient voiding every 2-3 hours during the day. Urge incontinence multiple times per day. Nocturia 2-3x/night.   Patient confirmed she does not have a pacemaker.     10/30/24: Today she reports minimal improvement with Gemtesa. Continues to be bothered by urinary frequency, urgency and urge incontinence. Most bothered  by frequency. Voiding every 2 hours during the day. Nocturia 3-4x/night. Nocturia is not bothersome to her because she is able to go right back to sleep.   She denies dysuria, fevers, chills, nausea or vomiting. Denies hematuria. Denies vaginal bleeding. Not using vaginal estrogen, not sure she was ever prescribed this. Asymptomatic in terms of UTI.     Her Pmhx, surgical history, meds, allergies were reviewed and updated as needed.     Past Medical History and Surgical History:  Past Medical History:   Diagnosis Date    Anxiety     Combined forms of age-related cataract, left eye 09/06/2022    Combined form of age-related cataract, left eye    Combined forms of age-related cataract, right eye 09/06/2022    Combined form of age-related  cataract, right eye    Depression, unspecified 02/23/2022    Depression    Hyperlipidemia     Hypertension     Macular drusen, bilateral     OAB (overactive bladder)     Urinary tract infection        Past Surgical History:   Procedure Laterality Date    CATARACT EXTRACTION W/  INTRAOCULAR LENS IMPLANT Left 05/02/2024    Dr. Michelle Byrnes, 5/30/24 OD    CATARACT EXTRACTION W/  INTRAOCULAR LENS IMPLANT Right 05/30/2024    Dr. Last    CHOLECYSTECTOMY  10/11/2013    Cholecystectomy    HYSTERECTOMY  10/11/2013    Hysterectomy    TONSILLECTOMY  10/11/2013    Tonsillectomy     Medications  Current Outpatient Medications on File Prior to Visit   Medication Sig Dispense Refill    alendronate (Fosamax) 70 mg tablet TAKE 1 TABLET BY MOUTH EVERY 7 DAYS 12 tablet 0    amLODIPine (Norvasc) 2.5 mg tablet TAKE 1 TABLET BY MOUTH ONCE DAILY. 30 tablet 0    ascorbic acid, vitamin C, 500 mg capsule Take 500 mg by mouth.      atorvastatin (Lipitor) 40 mg tablet TAKE 1 TABLET BY MOUTH EVERY DAY 30 tablet 0    calcium carbonate (Oscal) 500 mg calcium (1,250 mg) tablet Take 1 tablet (1,250 mg) by mouth once daily.      cholecalciferol (Vitamin D-3) 125 MCG (5000 UT) capsule Take 1 capsule (125 mcg) by mouth.      estradiol (Estrace) 0.01 % (0.1 mg/gram) vaginal cream Insert 1g vaginally three times per week at bedtime. 42.5 g 2    hydrocortisone 2.5 % cream Apply twice daily to affected areas of body (avoid face) for 1-2 weeks as directed 30 g 1    ketorolac (Acular) 0.5 % ophthalmic solution 1 drop to surgical eye 4 times a day starting 1 day before surgery 5 mL 2    multivitamin capsule Take by mouth.      ofloxacin (Ocuflox) 0.3 % ophthalmic solution 1 drop to operative eye 4 times a day starting 1 day before surgery 5 mL 2    prednisoLONE acetate (Pred-Forte) 1 % ophthalmic suspension 1 drop to operative eye 4 times a day starting the day of surgery (after surgery is done) 5 mL 2    sertraline (Zoloft) 100 mg tablet Take 1 tablet  (100 mg) by mouth once daily. 90 tablet 3    sertraline (Zoloft) 50 mg tablet Take 1 tablet (50 mg) by mouth once daily. 90 tablet 1    [DISCONTINUED] amLODIPine (Norvasc) 2.5 mg tablet Take 1 tablet (2.5 mg) by mouth once daily. 30 tablet 0    [DISCONTINUED] atorvastatin (Lipitor) 40 mg tablet Take 1 tablet (40 mg) by mouth once daily. 30 tablet 0     No current facility-administered medications on file prior to visit.     Allergies:  Allergies   Allergen Reactions    Aspirin Swelling    Food Extracts Swelling     Cod fish, violet, onion and hopps    Violet Unknown    Latex Unknown    Lidocaine Swelling     Objective     Physicial Exam  General: Well developed, well nourished, alert and cooperative, appears in no acute distress  Eyes: Non-injected conjunctiva, sclera clear, no proptosis  Cardiac: Extremities are warm and well perfused. No edema, cyanosis or pallor.   Lungs: Breathing is easy, non-labored. Speaking in clear and complete sentences. Normal diaphragmatic movement.  MSK: Ambulatory with steady gait, assisted with walker   Neuro: alert and oriented to person, place and time  Psych: Demonstrates good judgement and reason, without hallucinations, abnormal affect or abnormal behaviors.  Skin: no obvious lesions, no rashes.    Percutaneous Tibial Nerve Stimulation    Date/Time: 12/18/2024 2:06 PM    Performed by: Lo Ortega PA-C  Authorized by: Lo Ortega PA-C    Procedure discussed: discussed risks, benefits and alternatives      Procedure Details     Indications: urge incontinence, urinary frequency and urinary urgency      PTNS session #: 3    Bladder symptoms: unchanged      Ankle used: left      Stimulator intensity (mA): 2        Review of Systems  All other systems have been reviewed and are negative for complaint.    Assessment and Plan:  Noy Ochoa is a 84 y.o. female with urinary urgency, frequency, urge incontinence, nocturia. She presents for PTNS.     > PTNS completed without  difficultly. Patient tolerated procedure well.   > Return in 1 week for PTNS.     All questions and concerns were addressed. Patient verbalizes understanding and has no other questions at this time.     Lo Ortega PA-C  12/18/24 2:06 PM  Clinic phone: [phone], [phone]

## 2024-12-26 ENCOUNTER — APPOINTMENT (OUTPATIENT)
Dept: UROLOGY | Facility: CLINIC | Age: 84
End: 2024-12-26
Payer: MEDICARE

## 2024-12-26 VITALS — TEMPERATURE: 97.3 F

## 2024-12-26 DIAGNOSIS — R35.0 URINARY FREQUENCY: Primary | ICD-10-CM

## 2024-12-26 DIAGNOSIS — R39.15 URINARY URGENCY: ICD-10-CM

## 2024-12-26 DIAGNOSIS — N39.41 URGE INCONTINENCE OF URINE: ICD-10-CM

## 2024-12-26 PROCEDURE — 64566 NEUROELTRD STIM POST TIBIAL: CPT | Performed by: PHYSICIAN ASSISTANT

## 2024-12-26 ASSESSMENT — PAIN SCALES - GENERAL: PAINLEVEL_OUTOF10: 0-NO PAIN

## 2024-12-26 NOTE — PROGRESS NOTES
"  [facility]  Outpatient Clinic Note    Chief Complaint   Patient presents with    PTNS     Referring provider: No referring provider defined for this encounter.     Subjective   Noy Ochoa is a 84 y.o. female presenting for PTNS.     History of Present Illness:  Previous urologic workup:  > CT urogram 1/8/2024 for recurrent UTI: No stones, hydro or renal mass, bladder within normal limits  > PVR 10/2/24: 6mL     Previous urologic therapy:  > PFPT x 2, not helpful  > oxybutynin for years, discontinued due to concern for confusion  > trospium, discontinued due to development of rash  > Myrbetriq - inadequate symptom response  > Gemtesa 10/2/24 - 10/30/24, inadequate symptom improvement  > Vaginal estrogen cream    Patient reports she is doing well today. Urinary symptoms stable, unchanged. Continues to endorse urge incontinence, \"constant\".      Baseline urinary symptoms, PTNS 1 12/4/24:  Patient voiding every 2-3 hours during the day. Urge incontinence multiple times per day. Nocturia 2-3x/night.   Patient confirmed she does not have a pacemaker.     10/30/24: Today she reports minimal improvement with Gemtesa. Continues to be bothered by urinary frequency, urgency and urge incontinence. Most bothered  by frequency. Voiding every 2 hours during the day. Nocturia 3-4x/night. Nocturia is not bothersome to her because she is able to go right back to sleep.   She denies dysuria, fevers, chills, nausea or vomiting. Denies hematuria. Denies vaginal bleeding. Not using vaginal estrogen, not sure she was ever prescribed this. Asymptomatic in terms of UTI.     Her Pmhx, surgical history, meds, allergies were reviewed and updated as needed.     Past Medical History and Surgical History:  Past Medical History:   Diagnosis Date    Anxiety     Combined forms of age-related cataract, left eye 09/06/2022    Combined form of age-related cataract, left eye    Combined forms of age-related cataract, right eye 09/06/2022 "    Combined form of age-related cataract, right eye    Depression, unspecified 02/23/2022    Depression    Hyperlipidemia     Hypertension     Macular drusen, bilateral     OAB (overactive bladder)     Urinary tract infection        Past Surgical History:   Procedure Laterality Date    CATARACT EXTRACTION W/  INTRAOCULAR LENS IMPLANT Left 05/02/2024    Dr. Michelle Byrnes, 5/30/24 OD    CATARACT EXTRACTION W/  INTRAOCULAR LENS IMPLANT Right 05/30/2024    Dr. Last    CHOLECYSTECTOMY  10/11/2013    Cholecystectomy    HYSTERECTOMY  10/11/2013    Hysterectomy    TONSILLECTOMY  10/11/2013    Tonsillectomy     Medications  Current Outpatient Medications on File Prior to Visit   Medication Sig Dispense Refill    alendronate (Fosamax) 70 mg tablet TAKE 1 TABLET BY MOUTH EVERY 7 DAYS 12 tablet 0    amLODIPine (Norvasc) 2.5 mg tablet TAKE 1 TABLET BY MOUTH ONCE DAILY. 30 tablet 0    ascorbic acid, vitamin C, 500 mg capsule Take 500 mg by mouth.      atorvastatin (Lipitor) 40 mg tablet TAKE 1 TABLET BY MOUTH EVERY DAY 30 tablet 0    calcium carbonate (Oscal) 500 mg calcium (1,250 mg) tablet Take 1 tablet (1,250 mg) by mouth once daily.      cholecalciferol (Vitamin D-3) 125 MCG (5000 UT) capsule Take 1 capsule (125 mcg) by mouth.      estradiol (Estrace) 0.01 % (0.1 mg/gram) vaginal cream Insert 1g vaginally three times per week at bedtime. 42.5 g 2    hydrocortisone 2.5 % cream Apply twice daily to affected areas of body (avoid face) for 1-2 weeks as directed 30 g 1    ketorolac (Acular) 0.5 % ophthalmic solution 1 drop to surgical eye 4 times a day starting 1 day before surgery 5 mL 2    multivitamin capsule Take by mouth.      ofloxacin (Ocuflox) 0.3 % ophthalmic solution 1 drop to operative eye 4 times a day starting 1 day before surgery 5 mL 2    prednisoLONE acetate (Pred-Forte) 1 % ophthalmic suspension 1 drop to operative eye 4 times a day starting the day of surgery (after surgery is done) 5 mL 2    sertraline  (Zoloft) 100 mg tablet Take 1 tablet (100 mg) by mouth once daily. 90 tablet 3    sertraline (Zoloft) 50 mg tablet Take 1 tablet (50 mg) by mouth once daily. 90 tablet 1     No current facility-administered medications on file prior to visit.     Allergies:  Allergies   Allergen Reactions    Aspirin Swelling    Food Extracts Swelling     Cod fish, violet, onion and hopps    Violet Unknown    Latex Unknown    Lidocaine Swelling     Objective     Physicial Exam  General: Well developed, well nourished, alert and cooperative, appears in no acute distress  Eyes: Non-injected conjunctiva, sclera clear, no proptosis  Cardiac: Extremities are warm and well perfused. No edema, cyanosis or pallor.   Lungs: Breathing is easy, non-labored. Speaking in clear and complete sentences. Normal diaphragmatic movement.  MSK: Ambulatory with steady gait, assisted with walker   Neuro: alert and oriented to person, place and time  Psych: Demonstrates good judgement and reason, without hallucinations, abnormal affect or abnormal behaviors.  Skin: no obvious lesions, no rashes.    Percutaneous Tibial Nerve Stimulation    Date/Time: 12/26/2024 11:22 AM    Performed by: Lo Ortega PA-C  Authorized by: Lo Ortega PA-C    Procedure discussed: discussed risks, benefits and alternatives      Procedure Details     Indications: urge incontinence, urinary frequency and urinary urgency      PTNS session #: 4    Bladder symptoms: unchanged      Ankle used: left      Stimulator intensity (mA): 1        Review of Systems  All other systems have been reviewed and are negative for complaint.    Assessment and Plan:  Noy Ochoa is a 84 y.o. female with urinary urgency, frequency, urge incontinence, nocturia. She presents for PTNS.     > PTNS completed without difficultly. Patient tolerated procedure well.   > Return in 1 week for PTNS.     All questions and concerns were addressed. Patient verbalizes understanding and has no other  questions at this time.     Lo Ortega PA-C  12/26/24 11:22 AM  Clinic phone: [phone], [phone]

## 2025-01-03 ENCOUNTER — APPOINTMENT (OUTPATIENT)
Dept: UROLOGY | Facility: CLINIC | Age: 85
End: 2025-01-03
Payer: MEDICARE

## 2025-01-03 VITALS — WEIGHT: 160 LBS | BODY MASS INDEX: 29.26 KG/M2 | TEMPERATURE: 97.1 F

## 2025-01-03 DIAGNOSIS — R35.0 URINARY FREQUENCY: Primary | ICD-10-CM

## 2025-01-03 DIAGNOSIS — I10 BENIGN ESSENTIAL HYPERTENSION: ICD-10-CM

## 2025-01-03 DIAGNOSIS — R39.15 URINARY URGENCY: ICD-10-CM

## 2025-01-03 DIAGNOSIS — N39.41 URGE INCONTINENCE OF URINE: ICD-10-CM

## 2025-01-03 PROCEDURE — 64566 NEUROELTRD STIM POST TIBIAL: CPT | Performed by: PHYSICIAN ASSISTANT

## 2025-01-03 ASSESSMENT — PAIN SCALES - GENERAL: PAINLEVEL_OUTOF10: 0-NO PAIN

## 2025-01-03 NOTE — PROGRESS NOTES
[facility]  Outpatient Clinic Note    Chief Complaint   Patient presents with    PTNS     Referring provider: Lo Ortega PA-C  [id number] [facility]  [place] [address]     Subjective   Noy Ochoa is a 84 y.o. female presenting for PTNS.     History of Present Illness:  Previous urologic workup:  > CT urogram 1/8/2024 for recurrent UTI: No stones, hydro or renal mass, bladder within normal limits  > PVR 10/2/24: 6mL     Previous urologic therapy:  > PFPT x 2, not helpful  > oxybutynin for years, discontinued due to concern for confusion  > trospium, discontinued due to development of rash  > Myrbetriq - inadequate symptom response  > Gemtesa 10/2/24 - 10/30/24, inadequate symptom improvement  > Vaginal estrogen cream    Patient reports she is doing well today. Urinary symptoms stable, unchanged.     Baseline urinary symptoms, PTNS 1 12/4/24:  Patient voiding every 2-3 hours during the day. Urge incontinence multiple times per day. Nocturia 2-3x/night.   Patient confirmed she does not have a pacemaker.     10/30/24: Today she reports minimal improvement with Gemtesa. Continues to be bothered by urinary frequency, urgency and urge incontinence. Most bothered  by frequency. Voiding every 2 hours during the day. Nocturia 3-4x/night. Nocturia is not bothersome to her because she is able to go right back to sleep.   She denies dysuria, fevers, chills, nausea or vomiting. Denies hematuria. Denies vaginal bleeding. Not using vaginal estrogen, not sure she was ever prescribed this. Asymptomatic in terms of UTI.     Her Pmhx, surgical history, meds, allergies were reviewed and updated as needed.     Past Medical History and Surgical History:  Past Medical History:   Diagnosis Date    Anxiety     Combined forms of age-related cataract, left eye 09/06/2022    Combined form of age-related cataract, left eye    Combined forms of age-related cataract, right eye 09/06/2022    Combined form of age-related  cataract, right eye    Depression, unspecified 02/23/2022    Depression    Hyperlipidemia     Hypertension     Macular drusen, bilateral     OAB (overactive bladder)     Urinary tract infection        Past Surgical History:   Procedure Laterality Date    CATARACT EXTRACTION W/  INTRAOCULAR LENS IMPLANT Left 05/02/2024    Dr. Michelle Byrnes, 5/30/24 OD    CATARACT EXTRACTION W/  INTRAOCULAR LENS IMPLANT Right 05/30/2024    Dr. Last    CHOLECYSTECTOMY  10/11/2013    Cholecystectomy    HYSTERECTOMY  10/11/2013    Hysterectomy    TONSILLECTOMY  10/11/2013    Tonsillectomy     Medications  Current Outpatient Medications on File Prior to Visit   Medication Sig Dispense Refill    alendronate (Fosamax) 70 mg tablet TAKE 1 TABLET BY MOUTH EVERY 7 DAYS 12 tablet 0    amLODIPine (Norvasc) 2.5 mg tablet TAKE 1 TABLET BY MOUTH ONCE DAILY. 30 tablet 0    ascorbic acid, vitamin C, 500 mg capsule Take 500 mg by mouth.      atorvastatin (Lipitor) 40 mg tablet TAKE 1 TABLET BY MOUTH EVERY DAY 30 tablet 0    calcium carbonate (Oscal) 500 mg calcium (1,250 mg) tablet Take 1 tablet (1,250 mg) by mouth once daily.      cholecalciferol (Vitamin D-3) 125 MCG (5000 UT) capsule Take 1 capsule (125 mcg) by mouth.      estradiol (Estrace) 0.01 % (0.1 mg/gram) vaginal cream Insert 1g vaginally three times per week at bedtime. 42.5 g 2    hydrocortisone 2.5 % cream Apply twice daily to affected areas of body (avoid face) for 1-2 weeks as directed 30 g 1    ketorolac (Acular) 0.5 % ophthalmic solution 1 drop to surgical eye 4 times a day starting 1 day before surgery 5 mL 2    multivitamin capsule Take by mouth.      ofloxacin (Ocuflox) 0.3 % ophthalmic solution 1 drop to operative eye 4 times a day starting 1 day before surgery 5 mL 2    prednisoLONE acetate (Pred-Forte) 1 % ophthalmic suspension 1 drop to operative eye 4 times a day starting the day of surgery (after surgery is done) 5 mL 2    sertraline (Zoloft) 100 mg tablet Take 1 tablet  (100 mg) by mouth once daily. 90 tablet 3    sertraline (Zoloft) 50 mg tablet Take 1 tablet (50 mg) by mouth once daily. 90 tablet 1     No current facility-administered medications on file prior to visit.     Allergies:  Allergies   Allergen Reactions    Aspirin Swelling    Food Extracts Swelling     Cod fish, violet, onion and hopps    Violet Unknown    Latex Unknown    Lidocaine Swelling     Objective     Physicial Exam  General: Well developed, well nourished, alert and cooperative, appears in no acute distress  Eyes: Non-injected conjunctiva, sclera clear, no proptosis  Cardiac: Extremities are warm and well perfused. No edema, cyanosis or pallor.   Lungs: Breathing is easy, non-labored. Speaking in clear and complete sentences. Normal diaphragmatic movement.  MSK: Ambulatory with steady gait, assisted with cane  Neuro: alert and oriented to person, place and time  Psych: Demonstrates good judgement and reason, without hallucinations, abnormal affect or abnormal behaviors.  Skin: no obvious lesions, no rashes.    Percutaneous Tibial Nerve Stimulation    Date/Time: 1/3/2025 1:37 PM    Performed by: Lo Ortega PA-C  Authorized by: Lo Ortega PA-C    Procedure discussed: discussed risks, benefits and alternatives      Procedure Details     Indications: urge incontinence, urinary frequency and urinary urgency      PTNS session #: 5    Bladder symptoms: unchanged      Ankle used: left      Stimulator intensity (mA): 2        Review of Systems  All other systems have been reviewed and are negative for complaint.    Assessment and Plan:  Noy Ochoa is a 84 y.o. female with urinary urgency, frequency, urge incontinence, nocturia. She presents for PTNS.     > PTNS completed without difficultly. Patient tolerated procedure well.   > Return in 1 week for PTNS.     All questions and concerns were addressed. Patient verbalizes understanding and has no other questions at this time.     Lo Ortega  LISE  01/03/25 1:37 PM  Clinic phone: [phone], [phone]

## 2025-01-04 RX ORDER — AMLODIPINE BESYLATE 2.5 MG/1
2.5 TABLET ORAL DAILY
Qty: 90 TABLET | Refills: 0 | Status: SHIPPED | OUTPATIENT
Start: 2025-01-04

## 2025-01-04 RX ORDER — ATORVASTATIN CALCIUM 40 MG/1
40 TABLET, FILM COATED ORAL DAILY
Qty: 90 TABLET | Refills: 0 | Status: SHIPPED | OUTPATIENT
Start: 2025-01-04

## 2025-01-07 ENCOUNTER — APPOINTMENT (OUTPATIENT)
Dept: UROLOGY | Facility: HOSPITAL | Age: 85
End: 2025-01-07
Payer: MEDICARE

## 2025-01-08 ENCOUNTER — PROCEDURE VISIT (OUTPATIENT)
Dept: UROLOGY | Facility: CLINIC | Age: 85
End: 2025-01-08
Payer: MEDICARE

## 2025-01-08 ENCOUNTER — APPOINTMENT (OUTPATIENT)
Dept: UROLOGY | Facility: CLINIC | Age: 85
End: 2025-01-08
Payer: MEDICARE

## 2025-01-08 VITALS — TEMPERATURE: 97.9 F | BODY MASS INDEX: 31.1 KG/M2 | HEIGHT: 62 IN | WEIGHT: 169 LBS

## 2025-01-08 DIAGNOSIS — N39.41 URGE INCONTINENCE OF URINE: ICD-10-CM

## 2025-01-08 DIAGNOSIS — R35.0 URINARY FREQUENCY: Primary | ICD-10-CM

## 2025-01-08 DIAGNOSIS — R39.15 URINARY URGENCY: ICD-10-CM

## 2025-01-08 PROCEDURE — 64566 NEUROELTRD STIM POST TIBIAL: CPT | Performed by: PHYSICIAN ASSISTANT

## 2025-01-08 ASSESSMENT — PAIN SCALES - GENERAL: PAINLEVEL_OUTOF10: 0-NO PAIN

## 2025-01-08 NOTE — PROGRESS NOTES
[facility]  Outpatient Clinic Note    Chief Complaint   Patient presents with    Urinary Frequency     PTNS     Referring provider: No referring provider defined for this encounter.     Subjective   Noy Ochoa is a 84 y.o. female presenting for PTNS.     History of Present Illness:  Previous urologic workup:  > CT urogram 1/8/2024 for recurrent UTI: No stones, hydro or renal mass, bladder within normal limits  > PVR 10/2/24: 6mL     Previous urologic therapy:  > PFPT x 2, not helpful  > oxybutynin for years, discontinued due to concern for confusion  > trospium, discontinued due to development of rash  > Myrbetriq - inadequate symptom response  > Gemtesa 10/2/24 - 10/30/24, inadequate symptom improvement  > Vaginal estrogen cream    Patient reports she is doing well today. Urinary symptoms stable, unchanged.     Baseline urinary symptoms, PTNS 1 12/4/24:  Patient voiding every 2-3 hours during the day. Urge incontinence multiple times per day. Nocturia 2-3x/night.   Patient confirmed she does not have a pacemaker.     10/30/24: Today she reports minimal improvement with Gemtesa. Continues to be bothered by urinary frequency, urgency and urge incontinence. Most bothered  by frequency. Voiding every 2 hours during the day. Nocturia 3-4x/night. Nocturia is not bothersome to her because she is able to go right back to sleep.   She denies dysuria, fevers, chills, nausea or vomiting. Denies hematuria. Denies vaginal bleeding. Not using vaginal estrogen, not sure she was ever prescribed this. Asymptomatic in terms of UTI.     Her Pmhx, surgical history, meds, allergies were reviewed and updated as needed.     Past Medical History and Surgical History:  Past Medical History:   Diagnosis Date    Anxiety     Combined forms of age-related cataract, left eye 09/06/2022    Combined form of age-related cataract, left eye    Combined forms of age-related cataract, right eye 09/06/2022    Combined form of age-related  cataract, right eye    Depression, unspecified 02/23/2022    Depression    Hyperlipidemia     Hypertension     Macular drusen, bilateral     OAB (overactive bladder)     Urinary tract infection        Past Surgical History:   Procedure Laterality Date    CATARACT EXTRACTION W/  INTRAOCULAR LENS IMPLANT Left 05/02/2024    Dr. Michelle Byrnes, 5/30/24 OD    CATARACT EXTRACTION W/  INTRAOCULAR LENS IMPLANT Right 05/30/2024    Dr. Last    CHOLECYSTECTOMY  10/11/2013    Cholecystectomy    HYSTERECTOMY  10/11/2013    Hysterectomy    TONSILLECTOMY  10/11/2013    Tonsillectomy     Medications  Current Outpatient Medications on File Prior to Visit   Medication Sig Dispense Refill    alendronate (Fosamax) 70 mg tablet TAKE 1 TABLET BY MOUTH EVERY 7 DAYS 12 tablet 0    amLODIPine (Norvasc) 2.5 mg tablet Take 1 tablet (2.5 mg) by mouth once daily. 90 tablet 0    ascorbic acid, vitamin C, 500 mg capsule Take 500 mg by mouth.      atorvastatin (Lipitor) 40 mg tablet Take 1 tablet (40 mg) by mouth once daily. 90 tablet 0    calcium carbonate (Oscal) 500 mg calcium (1,250 mg) tablet Take 1 tablet (1,250 mg) by mouth once daily.      cholecalciferol (Vitamin D-3) 125 MCG (5000 UT) capsule Take 1 capsule (125 mcg) by mouth.      estradiol (Estrace) 0.01 % (0.1 mg/gram) vaginal cream Insert 1g vaginally three times per week at bedtime. 42.5 g 2    hydrocortisone 2.5 % cream Apply twice daily to affected areas of body (avoid face) for 1-2 weeks as directed 30 g 1    ketorolac (Acular) 0.5 % ophthalmic solution 1 drop to surgical eye 4 times a day starting 1 day before surgery 5 mL 2    multivitamin capsule Take by mouth.      ofloxacin (Ocuflox) 0.3 % ophthalmic solution 1 drop to operative eye 4 times a day starting 1 day before surgery 5 mL 2    prednisoLONE acetate (Pred-Forte) 1 % ophthalmic suspension 1 drop to operative eye 4 times a day starting the day of surgery (after surgery is done) 5 mL 2    sertraline (Zoloft) 100 mg  tablet Take 1 tablet (100 mg) by mouth once daily. 90 tablet 3    sertraline (Zoloft) 50 mg tablet Take 1 tablet (50 mg) by mouth once daily. 90 tablet 1    [DISCONTINUED] amLODIPine (Norvasc) 2.5 mg tablet TAKE 1 TABLET BY MOUTH ONCE DAILY. 30 tablet 0    [DISCONTINUED] atorvastatin (Lipitor) 40 mg tablet TAKE 1 TABLET BY MOUTH EVERY DAY 30 tablet 0     No current facility-administered medications on file prior to visit.     Allergies:  Allergies   Allergen Reactions    Aspirin Swelling    Food Extracts Swelling     Cod fish, violet, onion and hopps    Violet Unknown    Latex Unknown    Lidocaine Swelling     Objective     Physicial Exam  General: Well developed, well nourished, alert and cooperative, appears in no acute distress  Eyes: Non-injected conjunctiva, sclera clear, no proptosis  Cardiac: Extremities are warm and well perfused. No edema, cyanosis or pallor.   Lungs: Breathing is easy, non-labored. Speaking in clear and complete sentences. Normal diaphragmatic movement.  MSK: Ambulatory with steady gait, assisted with cane  Neuro: alert and oriented to person, place and time  Psych: Demonstrates good judgement and reason, without hallucinations, abnormal affect or abnormal behaviors.  Skin: no obvious lesions, no rashes.    Percutaneous Tibial Nerve Stimulation    Date/Time: 1/8/2025 2:46 PM    Performed by: oL Ortega PA-C  Authorized by: Lo Ortega PA-C    Procedure discussed: discussed risks, benefits and alternatives      Procedure Details     Indications: urge incontinence, urinary frequency and urinary urgency      PTNS session #: 6    Bladder symptoms: unchanged      Ankle used: left      Stimulator intensity (mA): 1        Review of Systems  All other systems have been reviewed and are negative for complaint.    Assessment and Plan:  Noy Ochoa is a 84 y.o. female with urinary urgency, frequency, urge incontinence, nocturia. She presents for PTNS.     > PTNS completed without  difficultly. Patient tolerated procedure well.   > Return in 1 week for PTNS.   > We discussed if patient does not appreciate symptom improvement at session 8, would likely recommend discontinuation of therapy.     All questions and concerns were addressed. Patient verbalizes understanding and has no other questions at this time.     Lo Ortega PA-C  01/08/25 2:46 PM  Clinic phone: [phone], [phone]

## 2025-01-15 ENCOUNTER — APPOINTMENT (OUTPATIENT)
Dept: UROLOGY | Facility: CLINIC | Age: 85
End: 2025-01-15
Payer: MEDICARE

## 2025-01-15 DIAGNOSIS — N39.41 URGE INCONTINENCE OF URINE: ICD-10-CM

## 2025-01-15 DIAGNOSIS — R35.0 URINARY FREQUENCY: Primary | ICD-10-CM

## 2025-01-15 DIAGNOSIS — R39.15 URINARY URGENCY: ICD-10-CM

## 2025-01-15 PROCEDURE — 64566 NEUROELTRD STIM POST TIBIAL: CPT | Performed by: PHYSICIAN ASSISTANT

## 2025-01-15 NOTE — PROGRESS NOTES
[facility]  Outpatient Clinic Note    Chief Complaint   Patient presents with    Urinary Frequency     PTNS     Referring provider: No referring provider defined for this encounter.     Subjective   Noy Ochoa is a 84 y.o. female presenting for PTNS.     History of Present Illness:  Previous urologic workup:  > CT urogram 1/8/2024 for recurrent UTI: No stones, hydro or renal mass, bladder within normal limits  > PVR 10/2/24: 6mL     Previous urologic therapy:  > PFPT x 2, not helpful  > oxybutynin for years, discontinued due to concern for confusion  > trospium, discontinued due to development of rash  > Myrbetriq - inadequate symptom response  > Gemtesa 10/2/24 - 10/30/24, inadequate symptom improvement  > Vaginal estrogen cream    Patient reports she is doing well today. Urinary symptoms stable, unchanged. Nocturia multiple times per night. Continues to have daily leakage. Voiding every 2-3 hours during the day.     Baseline urinary symptoms, PTNS 1 12/4/24:  Patient voiding every 2-3 hours during the day. Urge incontinence multiple times per day. Nocturia 2-3x/night.   Patient confirmed she does not have a pacemaker.     10/30/24: Today she reports minimal improvement with Gemtesa. Continues to be bothered by urinary frequency, urgency and urge incontinence. Most bothered  by frequency. Voiding every 2 hours during the day. Nocturia 3-4x/night. Nocturia is not bothersome to her because she is able to go right back to sleep.   She denies dysuria, fevers, chills, nausea or vomiting. Denies hematuria. Denies vaginal bleeding. Not using vaginal estrogen, not sure she was ever prescribed this. Asymptomatic in terms of UTI.     Her Pmhx, surgical history, meds, allergies were reviewed and updated as needed.     Past Medical History and Surgical History:  Past Medical History:   Diagnosis Date    Anxiety     Combined forms of age-related cataract, left eye 09/06/2022    Combined form of age-related  cataract, left eye    Combined forms of age-related cataract, right eye 09/06/2022    Combined form of age-related cataract, right eye    Depression, unspecified 02/23/2022    Depression    Hyperlipidemia     Hypertension     Macular drusen, bilateral     OAB (overactive bladder)     Urinary tract infection        Past Surgical History:   Procedure Laterality Date    CATARACT EXTRACTION W/  INTRAOCULAR LENS IMPLANT Left 05/02/2024    Dr. Michelle Last-Gisela, 5/30/24 OD    CATARACT EXTRACTION W/  INTRAOCULAR LENS IMPLANT Right 05/30/2024    Dr. Last    CHOLECYSTECTOMY  10/11/2013    Cholecystectomy    HYSTERECTOMY  10/11/2013    Hysterectomy    TONSILLECTOMY  10/11/2013    Tonsillectomy     Medications  Current Outpatient Medications on File Prior to Visit   Medication Sig Dispense Refill    alendronate (Fosamax) 70 mg tablet TAKE 1 TABLET BY MOUTH EVERY 7 DAYS 12 tablet 0    amLODIPine (Norvasc) 2.5 mg tablet Take 1 tablet (2.5 mg) by mouth once daily. 90 tablet 0    ascorbic acid, vitamin C, 500 mg capsule Take 500 mg by mouth.      atorvastatin (Lipitor) 40 mg tablet Take 1 tablet (40 mg) by mouth once daily. 90 tablet 0    calcium carbonate (Oscal) 500 mg calcium (1,250 mg) tablet Take 1 tablet (1,250 mg) by mouth once daily.      cholecalciferol (Vitamin D-3) 125 MCG (5000 UT) capsule Take 1 capsule (125 mcg) by mouth.      estradiol (Estrace) 0.01 % (0.1 mg/gram) vaginal cream Insert 1g vaginally three times per week at bedtime. 42.5 g 2    hydrocortisone 2.5 % cream Apply twice daily to affected areas of body (avoid face) for 1-2 weeks as directed 30 g 1    ketorolac (Acular) 0.5 % ophthalmic solution 1 drop to surgical eye 4 times a day starting 1 day before surgery 5 mL 2    multivitamin capsule Take by mouth.      ofloxacin (Ocuflox) 0.3 % ophthalmic solution 1 drop to operative eye 4 times a day starting 1 day before surgery 5 mL 2    prednisoLONE acetate (Pred-Forte) 1 % ophthalmic suspension 1 drop to  operative eye 4 times a day starting the day of surgery (after surgery is done) 5 mL 2    sertraline (Zoloft) 100 mg tablet Take 1 tablet (100 mg) by mouth once daily. 90 tablet 3    sertraline (Zoloft) 50 mg tablet Take 1 tablet (50 mg) by mouth once daily. 90 tablet 1     No current facility-administered medications on file prior to visit.     Allergies:  Allergies   Allergen Reactions    Aspirin Swelling    Food Extracts Swelling     Cod fish, violet, onion and hopps    Violet Unknown    Latex Unknown    Lidocaine Swelling     Objective     Physicial Exam  General: Well developed, well nourished, alert and cooperative, appears in no acute distress  Eyes: Non-injected conjunctiva, sclera clear, no proptosis  Cardiac: Extremities are warm and well perfused. No edema, cyanosis or pallor.   Lungs: Breathing is easy, non-labored. Speaking in clear and complete sentences. Normal diaphragmatic movement.  MSK: Ambulatory with steady gait, assisted with cane  Neuro: alert and oriented to person, place and time  Psych: Demonstrates good judgement and reason, without hallucinations, abnormal affect or abnormal behaviors.  Skin: no obvious lesions, no rashes.    Percutaneous Tibial Nerve Stimulation    Date/Time: 1/15/2025 2:21 PM    Performed by: Lo Ortega PA-C  Authorized by: Lo Ortega PA-C    Procedure Details     Indications: urge incontinence, urinary frequency and urinary urgency      PTNS session #: 7    Bladder symptoms: unchanged      Ankle used: left      Stimulator intensity (mA): 2        Review of Systems  All other systems have been reviewed and are negative for complaint.    Assessment and Plan:  Noy Ochoa is a 84 y.o. female with urinary urgency, frequency, urge incontinence, nocturia. She presents for PTNS.     > PTNS completed without difficultly. Patient tolerated procedure well.   > Return in 1 week for PTNS.   > We discussed if patient does not appreciate symptom improvement at/after  session 8, would likely recommend discontinuation of therapy. We did discuss Botox and sacral neuromodulation. She is not interested in SNS, may consider Botox. She was given patient education handouts [facility].    All questions and concerns were addressed. Patient verbalizes understanding and has no other questions at this time.     Lo Ortega PA-C  01/15/25 2:21 PM  Clinic phone: [phone], [phone]

## 2025-01-17 DIAGNOSIS — K21.9 GASTROESOPHAGEAL REFLUX DISEASE WITHOUT ESOPHAGITIS: ICD-10-CM

## 2025-01-17 DIAGNOSIS — I10 BENIGN ESSENTIAL HYPERTENSION: ICD-10-CM

## 2025-01-22 ENCOUNTER — APPOINTMENT (OUTPATIENT)
Dept: UROLOGY | Facility: CLINIC | Age: 85
End: 2025-01-22
Payer: MEDICARE

## 2025-01-22 ENCOUNTER — PROCEDURE VISIT (OUTPATIENT)
Dept: UROLOGY | Facility: CLINIC | Age: 85
End: 2025-01-22
Payer: MEDICARE

## 2025-01-22 DIAGNOSIS — N32.81 OAB (OVERACTIVE BLADDER): Primary | ICD-10-CM

## 2025-01-22 PROCEDURE — 64566 NEUROELTRD STIM POST TIBIAL: CPT | Performed by: PHYSICIAN ASSISTANT

## 2025-01-22 NOTE — PROGRESS NOTES
Subjective   Patient ID: Noy Ochoa is a 84 y.o. female who presents for Urinary Frequency (PTNS).  HPI  Patient is an 83 yo female with OAB, stress incontinence currently managed with Gemtessa and PTNS presents for treatment 8/12    Patient reports slight improvement of Nocturia, but still voids 2-3 times a night. She reports urgency incontinence and stress incontinence when she gets up from a seating position. She reports frequency every 2 hours during the day    Tried and failed PFPT x2, trospium, myrbetriq, and oxybutynin     Patient ID: Noy Ochoa is a 84 y.o. female.    PERCUTANEOUS TIBIAL NERVE STIMULATION    Date/Time: 1/23/2025 10:57 AM    Performed by: Patricia Sierra PA-C  Authorized by: Lo Ortega PA-C    Procedure Details     Indications: urge incontinence, urinary frequency and urinary urgency      PTNS session #: 8    Bladder symptoms: improved      Stimulator intensity (mA): 8      Review of Systems   Constitutional: Negative.    HENT: Negative.     Eyes: Negative.    Respiratory: Negative.     Cardiovascular: Negative.    Gastrointestinal: Negative.    Endocrine: Negative.    Genitourinary:  Positive for frequency and urgency.   Musculoskeletal: Negative.    Skin: Negative.    Allergic/Immunologic: Negative.    Neurological: Negative.    Hematological: Negative.    Psychiatric/Behavioral: Negative.         Objective   Physical Exam  Constitutional:       General: She is not in acute distress.     Appearance: Normal appearance.   HENT:      Head: Normocephalic and atraumatic.      Nose: Nose normal.      Mouth/Throat:      Mouth: Mucous membranes are dry.   Cardiovascular:      Rate and Rhythm: Normal rate.   Pulmonary:      Effort: Pulmonary effort is normal.   Abdominal:      General: Abdomen is flat.      Palpations: Abdomen is soft.   Musculoskeletal:         General: Normal range of motion.      Cervical back: Normal range of motion and neck supple.   Skin:     General: Skin is  warm and dry.   Neurological:      General: No focal deficit present.      Mental Status: She is alert and oriented to person, place, and time.   Psychiatric:         Mood and Affect: Mood normal.         Assessment/Plan     OAB  Continue PTNS weekly since she noticed some improvement.     Stress incontinence  I discussed PTNS will only improved OAB and urge incontinence symptoms.   I discussed other treatments, but she wants to finish PTNS therapy first.     Follow up next week for PTNS       Patricia Sierra PA-C 01/22/25 4:03 PM

## 2025-01-23 RX ORDER — ATORVASTATIN CALCIUM 40 MG/1
40 TABLET, FILM COATED ORAL DAILY
Qty: 90 TABLET | Refills: 0 | Status: SHIPPED | OUTPATIENT
Start: 2025-01-23

## 2025-01-23 RX ORDER — AMLODIPINE BESYLATE 2.5 MG/1
2.5 TABLET ORAL DAILY
Qty: 90 TABLET | Refills: 0 | Status: SHIPPED | OUTPATIENT
Start: 2025-01-23

## 2025-01-23 RX ORDER — ALENDRONATE SODIUM 70 MG/1
70 TABLET ORAL
Qty: 12 TABLET | Refills: 0 | Status: SHIPPED | OUTPATIENT
Start: 2025-01-23

## 2025-01-23 ASSESSMENT — ENCOUNTER SYMPTOMS
NEUROLOGICAL NEGATIVE: 1
GASTROINTESTINAL NEGATIVE: 1
MUSCULOSKELETAL NEGATIVE: 1
ALLERGIC/IMMUNOLOGIC NEGATIVE: 1
ENDOCRINE NEGATIVE: 1
RESPIRATORY NEGATIVE: 1
FREQUENCY: 1
CARDIOVASCULAR NEGATIVE: 1
PSYCHIATRIC NEGATIVE: 1
CONSTITUTIONAL NEGATIVE: 1
HEMATOLOGIC/LYMPHATIC NEGATIVE: 1
EYES NEGATIVE: 1

## 2025-01-24 ENCOUNTER — TELEPHONE (OUTPATIENT)
Dept: UROLOGY | Facility: CLINIC | Age: 85
End: 2025-01-24
Payer: MEDICARE

## 2025-01-24 NOTE — TELEPHONE ENCOUNTER
Called patient to follow up on PTNS and plan moving forward. Plans to continue PTNS. Notes she has a history of prolapse repair in the past, was just looking through her records. She denies symptoms of recurrent prolapse.     Plan to proceed with PTNS as planned.    Lo Ortega PA-C  01/24/25 9:47 AM  Clinic phone: [phone], [phone]

## 2025-01-29 ENCOUNTER — PROCEDURE VISIT (OUTPATIENT)
Dept: UROLOGY | Facility: CLINIC | Age: 85
End: 2025-01-29
Payer: MEDICARE

## 2025-01-29 ENCOUNTER — APPOINTMENT (OUTPATIENT)
Dept: UROLOGY | Facility: CLINIC | Age: 85
End: 2025-01-29
Payer: MEDICARE

## 2025-01-29 VITALS — WEIGHT: 160 LBS | BODY MASS INDEX: 29.44 KG/M2 | HEIGHT: 62 IN | TEMPERATURE: 97.2 F

## 2025-01-29 DIAGNOSIS — N32.81 OAB (OVERACTIVE BLADDER): Primary | ICD-10-CM

## 2025-01-29 DIAGNOSIS — N39.3 STRESS INCONTINENCE (FEMALE) (MALE): ICD-10-CM

## 2025-01-29 DIAGNOSIS — R32 URINARY INCONTINENCE IN FEMALE: ICD-10-CM

## 2025-01-29 PROCEDURE — 64566 NEUROELTRD STIM POST TIBIAL: CPT | Performed by: PHYSICIAN ASSISTANT

## 2025-01-29 ASSESSMENT — ENCOUNTER SYMPTOMS
EYES NEGATIVE: 1
RESPIRATORY NEGATIVE: 1
ALLERGIC/IMMUNOLOGIC NEGATIVE: 1
CARDIOVASCULAR NEGATIVE: 1
CONSTITUTIONAL NEGATIVE: 1
MUSCULOSKELETAL NEGATIVE: 1
FREQUENCY: 1
GASTROINTESTINAL NEGATIVE: 1
ENDOCRINE NEGATIVE: 1
PSYCHIATRIC NEGATIVE: 1
NEUROLOGICAL NEGATIVE: 1
HEMATOLOGIC/LYMPHATIC NEGATIVE: 1

## 2025-01-29 ASSESSMENT — PAIN SCALES - GENERAL: PAINLEVEL_OUTOF10: 0-NO PAIN

## 2025-01-29 NOTE — PROGRESS NOTES
Subjective   Patient ID: Noy Ochoa is a 84 y.o. female who presents for PTNS.  HPI  Patient is an 83 yo female with OAB, stress incontinence currently managed with Gemtessa and PTNS presents for treatment 9/12    Patient reports slight improvement of Nocturia, but still voids 2-3 times a night. She reports urgency incontinence and stress incontinence when she gets up from a seating position.   She is using 2 pads a day    Tried and failed PFPT x2, trospium, myrbetriq, and oxybutynin     Patient ID: Noy Ochoa is a 84 y.o. female.    PERCUTANEOUS TIBIAL NERVE STIMULATION    Date/Time: 1/29/2025 1:35 PM    Performed by: Patricia Sierra PA-C  Authorized by: Patricia Sierra PA-C    Procedure Details     Indications: urge incontinence, urinary frequency and urinary urgency      PTNS session #: 9    Bladder symptoms: improved      Stimulator intensity (mA): 3      Review of Systems   Constitutional: Negative.    HENT: Negative.     Eyes: Negative.    Respiratory: Negative.     Cardiovascular: Negative.    Gastrointestinal: Negative.    Endocrine: Negative.    Genitourinary:  Positive for frequency and urgency.   Musculoskeletal: Negative.    Skin: Negative.    Allergic/Immunologic: Negative.    Neurological: Negative.    Hematological: Negative.    Psychiatric/Behavioral: Negative.         Objective   Physical Exam  Constitutional:       General: She is not in acute distress.     Appearance: Normal appearance.   HENT:      Head: Normocephalic and atraumatic.      Nose: Nose normal.      Mouth/Throat:      Mouth: Mucous membranes are dry.   Cardiovascular:      Rate and Rhythm: Normal rate.   Pulmonary:      Effort: Pulmonary effort is normal.   Abdominal:      General: Abdomen is flat.      Palpations: Abdomen is soft.   Musculoskeletal:         General: Normal range of motion.      Cervical back: Normal range of motion and neck supple.   Skin:     General: Skin is warm and dry.   Neurological:      General: No  focal deficit present.      Mental Status: She is alert and oriented to person, place, and time.   Psychiatric:         Mood and Affect: Mood normal.         Assessment/Plan     OAB  Continue PTNS weekly since she noticed some improvement.     Stress incontinence  I discussed PTNS will only improved OAB and urge incontinence symptoms.   I discussed other treatments, but she wants to finish PTNS therapy first.     Follow up next week for PTNS       Patricia Sierra PA-C 01/29/25 1:35 PM

## 2025-02-04 ENCOUNTER — APPOINTMENT (OUTPATIENT)
Dept: UROLOGY | Facility: CLINIC | Age: 85
End: 2025-02-04
Payer: MEDICARE

## 2025-02-05 ENCOUNTER — OFFICE VISIT (OUTPATIENT)
Dept: NEUROLOGY | Facility: CLINIC | Age: 85
End: 2025-02-05
Payer: MEDICARE

## 2025-02-05 ENCOUNTER — APPOINTMENT (OUTPATIENT)
Dept: GERIATRIC MEDICINE | Facility: CLINIC | Age: 85
End: 2025-02-05
Payer: MEDICARE

## 2025-02-05 ENCOUNTER — APPOINTMENT (OUTPATIENT)
Dept: NEUROLOGY | Facility: CLINIC | Age: 85
End: 2025-02-05
Payer: MEDICARE

## 2025-02-05 VITALS — SYSTOLIC BLOOD PRESSURE: 144 MMHG | HEART RATE: 88 BPM | DIASTOLIC BLOOD PRESSURE: 67 MMHG | TEMPERATURE: 98.4 F

## 2025-02-05 DIAGNOSIS — I67.9 CEREBROVASCULAR DISEASE: ICD-10-CM

## 2025-02-05 DIAGNOSIS — G30.9 MIXED ALZHEIMER AND VASCULAR DEMENTIA: Primary | ICD-10-CM

## 2025-02-05 DIAGNOSIS — F02.80 MIXED ALZHEIMER AND VASCULAR DEMENTIA: Primary | ICD-10-CM

## 2025-02-05 DIAGNOSIS — F01.50 MIXED ALZHEIMER AND VASCULAR DEMENTIA: Primary | ICD-10-CM

## 2025-02-05 DIAGNOSIS — F01.A0 MILD VASCULAR DEMENTIA WITHOUT BEHAVIORAL DISTURBANCE, PSYCHOTIC DISTURBANCE, MOOD DISTURBANCE, OR ANXIETY: ICD-10-CM

## 2025-02-05 DIAGNOSIS — F32.89 OTHER DEPRESSION: ICD-10-CM

## 2025-02-05 PROCEDURE — 1126F AMNT PAIN NOTED NONE PRSNT: CPT | Performed by: PSYCHIATRY & NEUROLOGY

## 2025-02-05 PROCEDURE — 3077F SYST BP >= 140 MM HG: CPT | Performed by: PSYCHIATRY & NEUROLOGY

## 2025-02-05 PROCEDURE — 1159F MED LIST DOCD IN RCRD: CPT | Performed by: PSYCHIATRY & NEUROLOGY

## 2025-02-05 PROCEDURE — 99215 OFFICE O/P EST HI 40 MIN: CPT | Mod: GC | Performed by: PSYCHIATRY & NEUROLOGY

## 2025-02-05 PROCEDURE — 99215 OFFICE O/P EST HI 40 MIN: CPT | Performed by: PSYCHIATRY & NEUROLOGY

## 2025-02-05 PROCEDURE — 1157F ADVNC CARE PLAN IN RCRD: CPT | Performed by: PSYCHIATRY & NEUROLOGY

## 2025-02-05 PROCEDURE — 3078F DIAST BP <80 MM HG: CPT | Performed by: PSYCHIATRY & NEUROLOGY

## 2025-02-05 RX ORDER — MEMANTINE HYDROCHLORIDE 5 MG/1
TABLET ORAL
Qty: 70 TABLET | Refills: 0 | Status: SHIPPED | OUTPATIENT
Start: 2025-02-05

## 2025-02-05 RX ORDER — MEMANTINE HYDROCHLORIDE 10 MG/1
10 TABLET ORAL 2 TIMES DAILY
Qty: 60 TABLET | Refills: 2 | Status: SHIPPED | OUTPATIENT
Start: 2025-03-05 | End: 2025-06-03

## 2025-02-05 ASSESSMENT — MONTREAL COGNITIVE ASSESSMENT (MOCA)
12. MEMORY INDEX SCORE: 2
5. MEMORY TRIALS: 0
WHAT IS THE TOTAL SCORE (OUT OF 30): 19
7. [VIGILENCE] TAP WHEN HEARING DESIGNATED LETTER: 1
9. REPEAT EACH SENTENCE: 2
4. NAME EACH OF THE THREE ANIMALS SHOWN: 3
13. ORIENTATION SUBSCORE: 5
VISUOSPATIAL/EXECUTIVE SUBSCORE: 2
8. SERIAL SUBTRACTION OF 7S: 1
11. FOR EACH PAIR OF WORDS, WHAT CATEGORY DO THEY BELONG TO (OUT OF 2): 2
6. READ LIST OF DIGITS [FORWARD/BACKWARD]: 1
WHAT LEVEL OF EDUCATION WAS ATTAINED: 0
10. [FLUENCY] NAME WORDS STARTING WITH DESIGNATED LETTER: 0

## 2025-02-05 ASSESSMENT — ENCOUNTER SYMPTOMS
DEPRESSION: 0
OCCASIONAL FEELINGS OF UNSTEADINESS: 0

## 2025-02-05 ASSESSMENT — PAIN SCALES - GENERAL: PAINLEVEL_OUTOF10: 0-NO PAIN

## 2025-02-05 NOTE — PATIENT INSTRUCTIONS
Thank you for trusting us with your cognitive health. We came up with the following plan today:    -Repeat driving evaluation in summer 2025  -Start Memantine:    5 mg (1 tablet) nightly for 7 days    Then 5 mg (1 tablet) in the morning and 5 mg (1 tablet) nightly for 7 days   Then 5 mg (1 tablet) in the morning and 10 mg (2 tablets) nightly for 7 days   Then 10 mg (two tablets) twice daily    For your next prescription, we will change to 10 mg tablets so that you only have to take one tablet twice daily.   -Followup with your PCP for close management of your blood pressure, blood sugar and cholesterol  -Followup in 4 months, or sooner if needed    Brain-healthy lifestyle:   - Make sure your medical conditions (if any) such as diabetes, high blood pressure, high cholesterol, thyroid disease sleep apnea are optimally controlled.   - Use eyeglasses or hearing aids appropriately if needed.   - Eat a heart healthy diet (like a Mediterranean diet; lots of fruits and vegetables, fish; low fat;)  - Exercise regularly as tolerated.   - Maintain good sleep hygiene; avoid daytime naps; try to get 7 to 8 hours of continuous sleep at night.   - Stay mentally active - puzzles, word searches, books, playing cards.  - Stay socially active and engaged.

## 2025-02-05 NOTE — PROGRESS NOTES
Start Time:2:21  Stop Time:3:55     Chart reviewed, including physician notes and diagnostic studies, for 5 minutes prior to this appointment.      Accompanied by: sonJuan     Formulation: Ms. Ochoa is a very-pleasant 84 y.o. year old,  female with a past personal history of multifactorial dementia who is presenting today for a follow-up visit.  Today MoCA demonstrated some progression in cognitive changes, which is expected with disease. These changes are both dysexecutive and amnestic, which would support combined etiology (suspect AD + vascular). She is mildly hypertensive today so recommend close management of BP with PCP, as well as blood sugar (most recent A1c 6.1) and cholesterol to mitigate vascular risk factors. We will also start memantine to target cognition. Risks, benefits, and alternatives were discussed.     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     -Repeat driving evaluation in summer 2025 (recommendation from OT)   -Followup with your PCP for close management of your blood pressure, blood sugar and cholesterol  -Followup in 4 months, or sooner if needed  ---------------------------------------------------------------------------------------------------------------------------------------    History of Present Illness:  I last saw the patient in June 2024, at which time we decreased  sertraline, recommended a driving evaluation, and follow-up with physical therapy.  A driving evaluation was completed in the fall of 2024 and they recommended return to independent driving with some limitations (low traffic volumes and frequent areas, avoid highway driving).  They recommended a re-evaluation in 6-9 months.    She tolerated decrease in sertraline without side effects. Sleeps well, stays up until midnight, sleeps until 8-10am. Lives with Jerardo, her schnoodle.     She has not personally noticed memory changes. Per son, her short term memory can be harder sometimes and she will repeat questions. Her son assists with managing medications. Daughter and son help with finances. A woman named Stefany helps out around the house (cleaning, etc) several days per week.     No motor changes, no falls.   No SI, HI, AH, VH. No unintended weight changes.    Allergies   Allergen Reactions    Aspirin Swelling    Food Extracts Swelling     Cod fish, david, onion and hopps    David Unknown    Latex Unknown    Lidocaine Swelling         Current Outpatient Medications:     alendronate (Fosamax) 70 mg tablet, TAKE 1 TABLET BY MOUTH EVERY 7 DAYS, Disp: 12 tablet, Rfl: 0    amLODIPine (Norvasc) 2.5 mg tablet, TAKE 1 TABLET BY MOUTH ONCE DAILY., Disp: 90 tablet, Rfl: 0    atorvastatin (Lipitor) 40 mg tablet, TAKE 1 TABLET BY MOUTH EVERY DAY, Disp: 90 tablet, Rfl: 0    calcium carbonate (Oscal) 500 mg calcium (1,250 mg) tablet, Take 1 tablet (1,250 mg) by mouth once daily., Disp: , Rfl:     cholecalciferol (Vitamin D-3) 125 MCG (5000 UT) capsule, Take 1 capsule (125 mcg) by mouth., Disp: , Rfl:     estradiol (Estrace) 0.01 % (0.1 mg/gram) vaginal cream, Insert 1g vaginally three times per week at bedtime., Disp: 42.5 g, Rfl: 2    multivitamin capsule, Take by mouth., Disp: , Rfl:     sertraline (Zoloft) 100 mg tablet, Take 1 tablet (100 mg) by mouth once daily., Disp: 90 tablet, Rfl: 3    sertraline (Zoloft) 50 mg tablet,  Take 1 tablet (50 mg) by mouth once daily., Disp: 90 tablet, Rfl: 1    ascorbic acid, vitamin C, 500 mg capsule, Take 500 mg by mouth. (Patient not taking: Reported on 2/5/2025), Disp: , Rfl:     [START ON 3/5/2025] memantine (Namenda) 10 mg tablet, Take 1 tablet (10 mg) by mouth 2 times a day. Do not fill before March 5, 2025., Disp: 60 tablet, Rfl: 2    memantine (Namenda) 5 mg tablet, Week one: Take one tablet (5 mg) in the evening. Week two: Take one tablet (5 mg) in the morning and one tablet (5 mg) in the evening. Week three: Take one tablet (5 mg) in the morning and two tablets (10 mg) in the evening. Week four: Take two tablets (10 mg) in the morning and two tablets (10 mg) in the evening, Disp: 70 tablet, Rfl: 0    Review of Systems  As per HPI, otherwise all other systems have been reviewed are negative for complaint.      Objective   /67   Pulse 88   Temp 36.9 °C (98.4 °F) (Tympanic)     EXAMINATION  Mental Status Examination  General appearance: well kept, good eye contact, cooperative  Orientation: alert and oriented to time, place, and person  Motor: within normal limits, gait is stable  Speech: normal rate, tone and rhythm  Mood: euthymic  Affect: Euthymic, full-range  Passive death wish: No  Suicidal ideation: No  Thought process: logical, linear, and goal-directed  Thought content: Hallucinations:no, Delusions: none, denied; and not responding to internal stimuli  Insight/Judgment: Good/Good  Fund of knowledge: Good  Recent and remote memory: Fair/Good  Attention span and concentration: Good  Language: regular, rate, rhythm, tone, and volume and without paraphrasic errors    MoCA  Visuospatial/Executive: 2  Naming: 3  Memory (Score '0' as this is an Unscored Section): 0  Attention: Read List of Digits: 1  Attention: Read List of Letters: 1  Attention: Serial Sevens: 1  Language: Repeat: 2  Language: Fluency: 0 (10 words; got an 11th just after one minute timer went off)  Abstraction:  "2  Delayed Recall: 2 (mis 10/15)  Orientation: 5  Add 1 Point if </=12 yr Education: 0  MOCA Total Score: 19    MoCA (06/19/2024): 24/30 (-1 visuospatial/executive, -2 attention, -1 language, -1 delayed recall, -1 orientation)   \"F\"= 10 words  MIS: 13/15     MoCA ( 08/16/2023): 22/30 (-1 visuospatial/executive, -1 attention, -1 language, -3 delayed recall, -1 orientation)  \"f\"=8 words  MIS: 10/15     MoCA (8/25/2021): 26/30 (-1 visuospatial/executive, -2 attention, -1 orientation)     MoCA ( 07/08/2020): 27/30 (-2 visuospatial/executive, -1 attention)  \"f\"=11 words  MIS: 15/5     MoCA (11/12/18): 23/30     MoCA (11/2017): 23/30     MoCA (10/16) 27/30     I saw and evaluated the patient. I personally obtained the key and critical portions of the history and physical exam or was physically present for key and critical portions performed by the resident/fellow. I reviewed the resident/fellow's documentation and discussed the patient with the resident/fellow. I agree with the resident/fellow's medical decision making as documented in the note.    Wesley Burr MD    "

## 2025-02-07 ENCOUNTER — APPOINTMENT (OUTPATIENT)
Dept: UROLOGY | Facility: CLINIC | Age: 85
End: 2025-02-07
Payer: MEDICARE

## 2025-02-10 ENCOUNTER — APPOINTMENT (OUTPATIENT)
Dept: PRIMARY CARE | Facility: CLINIC | Age: 85
End: 2025-02-10
Payer: MEDICARE

## 2025-02-10 VITALS — BODY MASS INDEX: 30.91 KG/M2 | SYSTOLIC BLOOD PRESSURE: 126 MMHG | DIASTOLIC BLOOD PRESSURE: 76 MMHG | WEIGHT: 169 LBS

## 2025-02-10 DIAGNOSIS — R73.02 GLUCOSE INTOLERANCE (IMPAIRED GLUCOSE TOLERANCE): ICD-10-CM

## 2025-02-10 DIAGNOSIS — D64.9 ANEMIA, UNSPECIFIED TYPE: ICD-10-CM

## 2025-02-10 DIAGNOSIS — I10 BENIGN ESSENTIAL HYPERTENSION: ICD-10-CM

## 2025-02-10 DIAGNOSIS — M81.0 OSTEOPOROSIS, UNSPECIFIED OSTEOPOROSIS TYPE, UNSPECIFIED PATHOLOGICAL FRACTURE PRESENCE: ICD-10-CM

## 2025-02-10 DIAGNOSIS — Z00.00 HEALTH CARE MAINTENANCE: Primary | ICD-10-CM

## 2025-02-10 DIAGNOSIS — E78.2 MIXED HYPERLIPIDEMIA: ICD-10-CM

## 2025-02-10 PROCEDURE — G0439 PPPS, SUBSEQ VISIT: HCPCS | Performed by: INTERNAL MEDICINE

## 2025-02-10 PROCEDURE — 99397 PER PM REEVAL EST PAT 65+ YR: CPT | Performed by: INTERNAL MEDICINE

## 2025-02-10 PROCEDURE — 1159F MED LIST DOCD IN RCRD: CPT | Performed by: INTERNAL MEDICINE

## 2025-02-10 PROCEDURE — 3078F DIAST BP <80 MM HG: CPT | Performed by: INTERNAL MEDICINE

## 2025-02-10 PROCEDURE — 1160F RVW MEDS BY RX/DR IN RCRD: CPT | Performed by: INTERNAL MEDICINE

## 2025-02-10 PROCEDURE — 1157F ADVNC CARE PLAN IN RCRD: CPT | Performed by: INTERNAL MEDICINE

## 2025-02-10 PROCEDURE — 1170F FXNL STATUS ASSESSED: CPT | Performed by: INTERNAL MEDICINE

## 2025-02-10 PROCEDURE — 99214 OFFICE O/P EST MOD 30 MIN: CPT | Performed by: INTERNAL MEDICINE

## 2025-02-10 PROCEDURE — 1036F TOBACCO NON-USER: CPT | Performed by: INTERNAL MEDICINE

## 2025-02-10 PROCEDURE — 3074F SYST BP LT 130 MM HG: CPT | Performed by: INTERNAL MEDICINE

## 2025-02-10 NOTE — PROGRESS NOTES
Subjective   Patient ID: Noy Ochoa is a 84 y.o. female who presents for No chief complaint on file..    HPI CPE see updated front sheet no chest pain no shortness of breath no side effect with medication has been doing well there have been no falls she drives carefully she has been to the ophthalmologist has had no hypoglycemic events bowels normal no dysuria    Past medical history hypertension hyperlipidemia vascular dementia osteoporosis osteoarthritis urinary incontinence status post cholecystectomy status post hysterectomy status post pneumonia    Medications noted and unchanged      Allergies aspirin latex    Social history no tobacco no alcohol    Family history mother colon cancer diabetes hypertension father picks disease Brother ASCVD diabetes    Prevention some laboratory results reviewed discussed with mammogram limited exercise discussed with vaccinations no longer having colonoscopy     Depression depression treatedReview of Systems    Objective   /76   Wt 76.7 kg (169 lb)   BMI 30.91 kg/m²     Physical Exam vital signs noted alert and oriented NCAT PERRLA EOMI nares without discharge OP benign TM normal bilateral EAC clear bilateral no AC nodes no JVD or bruit no thyromegaly chest clear to auscultation and percussionno crackles CV regular rate and rhythm S1-S2 without murmur gallop or rub abdomen soft nontender normal active bowel sounds LS spine relatively straight and nontender spinous process negative straight leg raise extremities no clubbing cyanosis  edema intact distal pulses DTR 1+     Assessment/Plan impression General Medical examination hypertension hyperlipidemia glucose intolerance anemia depression cognitive impairment GERD urinary incontinence osteoporosis  Plan continue with care and safety in the home nutrition and hydration at home check A1c advised on long-term blood sugar test check comprehensive panel advised on glucose potassium and kidney function as well as liver  function check CBC advised on blood count check lipid panel advised on cholesterol profile check on vitamin D level advised on calcium and vitamin D avoidance of NSAIDs okay for PPI as needed continue with other medications for osteoporosis incontinence or cognitive impairment is needed and then recheck 3 months based on labs TT 50 cc 26

## 2025-02-11 ENCOUNTER — APPOINTMENT (OUTPATIENT)
Dept: UROLOGY | Facility: CLINIC | Age: 85
End: 2025-02-11
Payer: MEDICARE

## 2025-02-11 ENCOUNTER — PROCEDURE VISIT (OUTPATIENT)
Dept: UROLOGY | Facility: CLINIC | Age: 85
End: 2025-02-11
Payer: MEDICARE

## 2025-02-11 VITALS — TEMPERATURE: 96.8 F

## 2025-02-11 DIAGNOSIS — N32.81 OAB (OVERACTIVE BLADDER): Primary | ICD-10-CM

## 2025-02-11 PROCEDURE — 64566 NEUROELTRD STIM POST TIBIAL: CPT | Performed by: PHYSICIAN ASSISTANT

## 2025-02-11 ASSESSMENT — ENCOUNTER SYMPTOMS
MUSCULOSKELETAL NEGATIVE: 1
CONSTITUTIONAL NEGATIVE: 1
ENDOCRINE NEGATIVE: 1
RESPIRATORY NEGATIVE: 1
CARDIOVASCULAR NEGATIVE: 1
EYES NEGATIVE: 1
HEMATOLOGIC/LYMPHATIC NEGATIVE: 1
PSYCHIATRIC NEGATIVE: 1
NEUROLOGICAL NEGATIVE: 1
GASTROINTESTINAL NEGATIVE: 1
ALLERGIC/IMMUNOLOGIC NEGATIVE: 1
FREQUENCY: 1

## 2025-02-11 ASSESSMENT — PAIN SCALES - GENERAL: PAINLEVEL_OUTOF10: 0-NO PAIN

## 2025-02-11 NOTE — PROGRESS NOTES
Subjective   Patient ID: Noy Ochoa is a 84 y.o. female who presents for Urinary Frequency (PTNS).  Urinary Frequency   Associated symptoms include frequency and urgency.     Patient is an 85 yo female with OAB, stress incontinence currently managed with Gemtessa and PTNS presents for treatment 10/12    Patient reports improvement of Nocturia, but still voids 2 times a night. She reports urgency incontinence and stress incontinence when she gets up from a seating position.   She is using 2 pads a day    Tried and failed PFPT x2, trospium, myrbetriq, and oxybutynin     Patient ID: Noy Ochoa is a 84 y.o. female.    PERCUTANEOUS TIBIAL NERVE STIMULATION    Date/Time: 2/11/2025 11:35 AM    Performed by: Patricia Sierra PA-C  Authorized by: Patricia Sierra PA-C    Procedure Details     Indications: urge incontinence, urinary frequency and urinary urgency      PTNS session #: 10    Bladder symptoms: improved      Stimulator intensity (mA): 5      Review of Systems   Constitutional: Negative.    HENT: Negative.     Eyes: Negative.    Respiratory: Negative.     Cardiovascular: Negative.    Gastrointestinal: Negative.    Endocrine: Negative.    Genitourinary:  Positive for frequency and urgency.   Musculoskeletal: Negative.    Skin: Negative.    Allergic/Immunologic: Negative.    Neurological: Negative.    Hematological: Negative.    Psychiatric/Behavioral: Negative.         Objective   Physical Exam  Constitutional:       General: She is not in acute distress.     Appearance: Normal appearance.   HENT:      Head: Normocephalic and atraumatic.      Nose: Nose normal.      Mouth/Throat:      Mouth: Mucous membranes are dry.   Cardiovascular:      Rate and Rhythm: Normal rate.   Pulmonary:      Effort: Pulmonary effort is normal.   Abdominal:      General: Abdomen is flat.      Palpations: Abdomen is soft.   Musculoskeletal:         General: Normal range of motion.      Cervical back: Normal range of motion and neck  supple.   Skin:     General: Skin is warm and dry.   Neurological:      General: No focal deficit present.      Mental Status: She is alert and oriented to person, place, and time.   Psychiatric:         Mood and Affect: Mood normal.         Assessment/Plan     OAB  Continue PTNS weekly since she noticed  improvement.   She is interested in Maintenance therapy.   Will obtain prior autherixzation.    Stress incontinence  I discussed PTNS will only improved OAB and urge incontinence symptoms.   I discussed other treatments, but she wants to finish PTNS therapy first.     Follow up next week for PTNS       Patricia Sierra PA-C 02/11/25 11:35 AM

## 2025-02-12 ENCOUNTER — APPOINTMENT (OUTPATIENT)
Dept: UROLOGY | Facility: CLINIC | Age: 85
End: 2025-02-12
Payer: MEDICARE

## 2025-02-19 ENCOUNTER — APPOINTMENT (OUTPATIENT)
Dept: UROLOGY | Facility: CLINIC | Age: 85
End: 2025-02-19
Payer: MEDICARE

## 2025-02-19 ASSESSMENT — SLIT LAMP EXAM - LIDS
COMMENTS: GOOD POSITION, DERMATOCHALASIS
COMMENTS: GOOD POSITION, DERMATOCHALASIS

## 2025-02-19 ASSESSMENT — EXTERNAL EXAM - RIGHT EYE: OD_EXAM: NORMAL

## 2025-02-19 ASSESSMENT — EXTERNAL EXAM - LEFT EYE: OS_EXAM: NORMAL

## 2025-02-19 NOTE — PROGRESS NOTES
Macular [id number]  -No FH of AMD  -OCT macula (2/25/25) - Normal thickness and contour OU.  Intact IS-OS OU.  No edema OU.  Small scattered drusen OD>OS. 255/245.  Similar to 8/15/23.   -Advised to wear UV blocking sunglasses. Eat green leafy vegetables/fish, brightly colored fruit/vegetables, and decrease red meat consumption.   -Not visually significant at this time. Monitor.   -F/u 1 year for comprehensive exam and OCT macula.     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 Good vision. IOP good. Off all gtts.   -OD - Noted intraoperatively to have zonular weakness/dehiscence from 2-4 oclock. Most likely, IOL should remain stable, but there is a small chance of IOL dislocation in lifetime which may result in IOL dislocation and potential need for additional surgery.   -F/u 1 year for comprehensive exam and OCT macula.     DlrdksjqtV89.00  QpdjuwmxhgeN33.209  KkntezaumvQ71.4  -Current glasses from 2024  -Refraction performed today for diagnostic purposes only and without specific intent to dispense Rx. Glasses Rx not dispensed today.         No history of refractive surgery.   No FH of AMD/glaucoma

## 2025-02-25 ENCOUNTER — APPOINTMENT (OUTPATIENT)
Dept: OPHTHALMOLOGY | Facility: CLINIC | Age: 85
End: 2025-02-25
Payer: MEDICARE

## 2025-02-25 DIAGNOSIS — H35.363 MACULAR DRUSEN, BILATERAL: ICD-10-CM

## 2025-02-25 DIAGNOSIS — H52.203 ASTIGMATISM OF BOTH EYES, UNSPECIFIED TYPE: ICD-10-CM

## 2025-02-25 DIAGNOSIS — H52.4 PRESBYOPIA: ICD-10-CM

## 2025-02-25 DIAGNOSIS — H52.03 HYPEROPIA, BILATERAL: ICD-10-CM

## 2025-02-25 DIAGNOSIS — Z96.1 PSEUDOPHAKIA: Primary | ICD-10-CM

## 2025-02-25 PROCEDURE — 92134 CPTRZ OPH DX IMG PST SGM RTA: CPT | Performed by: OPHTHALMOLOGY

## 2025-02-25 PROCEDURE — 92014 COMPRE OPH EXAM EST PT 1/>: CPT | Performed by: OPHTHALMOLOGY

## 2025-02-25 ASSESSMENT — CONF VISUAL FIELD
OS_SUPERIOR_NASAL_RESTRICTION: 0
OS_INFERIOR_TEMPORAL_RESTRICTION: 0
OD_SUPERIOR_TEMPORAL_RESTRICTION: 0
OS_NORMAL: 1
OS_INFERIOR_NASAL_RESTRICTION: 0
OS_SUPERIOR_TEMPORAL_RESTRICTION: 0
OD_INFERIOR_TEMPORAL_RESTRICTION: 0
OD_NORMAL: 1
OD_INFERIOR_NASAL_RESTRICTION: 0
OD_SUPERIOR_NASAL_RESTRICTION: 0

## 2025-02-25 ASSESSMENT — VISUAL ACUITY
OD_CC: 20/25
OS_CC+: -2
OS_CC: 20/25
CORRECTION_TYPE: GLASSES
METHOD: SNELLEN - LINEAR
OD_CC+: -2

## 2025-02-25 ASSESSMENT — REFRACTION_MANIFEST
OS_ADD: +2.50
OD_AXIS: 070
OD_SPHERE: PLANO
OS_CYLINDER: -0.50
OS_AXIS: 075
OD_CYLINDER: -1.00
OD_ADD: +2.50
OS_SPHERE: +0.25

## 2025-02-25 ASSESSMENT — CUP TO DISC RATIO
OD_RATIO: .25
OS_RATIO: .25

## 2025-02-25 ASSESSMENT — REFRACTION_WEARINGRX
OD_AXIS: 070
OD_ADD: +2.50
OS_AXIS: 075
OS_SPHERE: +0.25
OS_CYLINDER: -0.25
OS_ADD: +2.50
SPECS_TYPE: BIFOCAL
OD_SPHERE: PLANO
OD_CYLINDER: -0.75

## 2025-02-25 ASSESSMENT — TONOMETRY
OD_IOP_MMHG: 11
IOP_METHOD: GOLDMANN APPLANATION
OS_IOP_MMHG: 11

## 2025-02-25 ASSESSMENT — ENCOUNTER SYMPTOMS: EYES NEGATIVE: 1

## 2025-02-26 ENCOUNTER — PROCEDURE VISIT (OUTPATIENT)
Dept: UROLOGY | Facility: CLINIC | Age: 85
End: 2025-02-26
Payer: MEDICARE

## 2025-02-26 ENCOUNTER — APPOINTMENT (OUTPATIENT)
Dept: UROLOGY | Facility: CLINIC | Age: 85
End: 2025-02-26
Payer: MEDICARE

## 2025-02-26 VITALS — TEMPERATURE: 97 F

## 2025-02-26 DIAGNOSIS — N32.81 OAB (OVERACTIVE BLADDER): Primary | ICD-10-CM

## 2025-02-26 LAB
25(OH)D3+25(OH)D2 SERPL-MCNC: 51 NG/ML (ref 30–100)
ALBUMIN SERPL-MCNC: 4.8 G/DL (ref 3.6–5.1)
ALP SERPL-CCNC: 51 U/L (ref 37–153)
ALT SERPL-CCNC: 35 U/L (ref 6–29)
ANION GAP SERPL CALCULATED.4IONS-SCNC: 11 MMOL/L (CALC) (ref 7–17)
AST SERPL-CCNC: 26 U/L (ref 10–35)
BILIRUB SERPL-MCNC: 0.7 MG/DL (ref 0.2–1.2)
BUN SERPL-MCNC: 15 MG/DL (ref 7–25)
CALCIUM SERPL-MCNC: 9.9 MG/DL (ref 8.6–10.4)
CHLORIDE SERPL-SCNC: 104 MMOL/L (ref 98–110)
CHOLEST SERPL-MCNC: 127 MG/DL
CHOLEST/HDLC SERPL: 3 (CALC)
CO2 SERPL-SCNC: 26 MMOL/L (ref 20–32)
CREAT SERPL-MCNC: 0.66 MG/DL (ref 0.6–0.95)
EGFRCR SERPLBLD CKD-EPI 2021: 88 ML/MIN/1.73M2
ERYTHROCYTE [DISTWIDTH] IN BLOOD BY AUTOMATED COUNT: 13 % (ref 11–15)
EST. AVERAGE GLUCOSE BLD GHB EST-MCNC: 143 MG/DL
EST. AVERAGE GLUCOSE BLD GHB EST-SCNC: 7.9 MMOL/L
GLUCOSE SERPL-MCNC: 123 MG/DL (ref 65–139)
HBA1C MFR BLD: 6.6 % OF TOTAL HGB
HCT VFR BLD AUTO: 43 % (ref 35–45)
HDLC SERPL-MCNC: 42 MG/DL
HGB BLD-MCNC: 14 G/DL (ref 11.7–15.5)
LDLC SERPL CALC-MCNC: 66 MG/DL (CALC)
MCH RBC QN AUTO: 29.9 PG (ref 27–33)
MCHC RBC AUTO-ENTMCNC: 32.6 G/DL (ref 32–36)
MCV RBC AUTO: 91.7 FL (ref 80–100)
NONHDLC SERPL-MCNC: 85 MG/DL (CALC)
PLATELET # BLD AUTO: 334 THOUSAND/UL (ref 140–400)
PMV BLD REES-ECKER: 11.6 FL (ref 7.5–12.5)
POTASSIUM SERPL-SCNC: 4.5 MMOL/L (ref 3.5–5.3)
PROT SERPL-MCNC: 7.5 G/DL (ref 6.1–8.1)
RBC # BLD AUTO: 4.69 MILLION/UL (ref 3.8–5.1)
SODIUM SERPL-SCNC: 141 MMOL/L (ref 135–146)
TRIGL SERPL-MCNC: 106 MG/DL
WBC # BLD AUTO: 9.8 THOUSAND/UL (ref 3.8–10.8)

## 2025-02-26 PROCEDURE — 64566 NEUROELTRD STIM POST TIBIAL: CPT | Performed by: PHYSICIAN ASSISTANT

## 2025-02-26 ASSESSMENT — ENCOUNTER SYMPTOMS
PSYCHIATRIC NEGATIVE: 1
CONSTITUTIONAL NEGATIVE: 1
HEMATOLOGIC/LYMPHATIC NEGATIVE: 1
CARDIOVASCULAR NEGATIVE: 1
MUSCULOSKELETAL NEGATIVE: 1
RESPIRATORY NEGATIVE: 1
ENDOCRINE NEGATIVE: 1
GASTROINTESTINAL NEGATIVE: 1
NEUROLOGICAL NEGATIVE: 1
FREQUENCY: 1
EYES NEGATIVE: 1
ALLERGIC/IMMUNOLOGIC NEGATIVE: 1

## 2025-02-26 ASSESSMENT — PAIN SCALES - GENERAL: PAINLEVEL_OUTOF10: 0-NO PAIN

## 2025-02-26 NOTE — PROGRESS NOTES
Subjective   Patient ID: Noy Ochoa is a 84 y.o. female who presents for Overactive Bladder.  Urinary Frequency   Associated symptoms include frequency and urgency.     Patient is an 83 yo female with OAB, stress incontinence currently managed with Gemtessa and PTNS presents for treatment 11/12    Patient reports improvement of Nocturia, but still voids 2 times a night. She reports urgency incontinence and stress incontinence when she gets up from a seating position.   She is using 2 pads a day    Tried and failed PFPT x2, trospium, myrbetriq, and oxybutynin     Patient ID: Noy Ochoa is a 84 y.o. female.    PERCUTANEOUS TIBIAL NERVE STIMULATION    Date/Time: 2/26/2025 1:32 PM    Performed by: Patricia Sierra PA-C  Authorized by: Patricia Sierra PA-C    Procedure Details     Indications: urge incontinence, urinary frequency and urinary urgency      PTNS session #: 11    Bladder symptoms: improved      Ankle used: left      Stimulator intensity (mA): 5      Review of Systems   Constitutional: Negative.    HENT: Negative.     Eyes: Negative.    Respiratory: Negative.     Cardiovascular: Negative.    Gastrointestinal: Negative.    Endocrine: Negative.    Genitourinary:  Positive for frequency and urgency.   Musculoskeletal: Negative.    Skin: Negative.    Allergic/Immunologic: Negative.    Neurological: Negative.    Hematological: Negative.    Psychiatric/Behavioral: Negative.         Objective   Physical Exam  Constitutional:       General: She is not in acute distress.     Appearance: Normal appearance.   HENT:      Head: Normocephalic and atraumatic.      Nose: Nose normal.      Mouth/Throat:      Mouth: Mucous membranes are dry.   Cardiovascular:      Rate and Rhythm: Normal rate.   Pulmonary:      Effort: Pulmonary effort is normal.   Abdominal:      General: Abdomen is flat.      Palpations: Abdomen is soft.   Musculoskeletal:         General: Normal range of motion.      Cervical back: Normal range of  motion and neck supple.   Skin:     General: Skin is warm and dry.   Neurological:      General: No focal deficit present.      Mental Status: She is alert and oriented to person, place, and time.   Psychiatric:         Mood and Affect: Mood normal.         Assessment/Plan     OAB  Continue PTNS weekly since she noticed  improvement.   She is interested in Maintenance therapy.   Will obtain prior authorization.    Stress incontinence  I discussed PTNS will only improved OAB and urge incontinence symptoms.   I discussed other treatments, but she wants to finish PTNS therapy first.     Follow up next week for PTNS       Patricia Sierra PA-C 02/26/25 1:32 PM

## 2025-03-02 ASSESSMENT — ACTIVITIES OF DAILY LIVING (ADL)
GROCERY_SHOPPING: INDEPENDENT
DRESSING: INDEPENDENT
TAKING_MEDICATION: INDEPENDENT
BATHING: INDEPENDENT
MANAGING_FINANCES: INDEPENDENT
DOING_HOUSEWORK: INDEPENDENT

## 2025-03-02 ASSESSMENT — ENCOUNTER SYMPTOMS
OCCASIONAL FEELINGS OF UNSTEADINESS: 0
DEPRESSION: 0
LOSS OF SENSATION IN FEET: 0

## 2025-03-02 ASSESSMENT — PATIENT HEALTH QUESTIONNAIRE - PHQ9
2. FEELING DOWN, DEPRESSED OR HOPELESS: NOT AT ALL
1. LITTLE INTEREST OR PLEASURE IN DOING THINGS: NOT AT ALL
SUM OF ALL RESPONSES TO PHQ9 QUESTIONS 1 AND 2: 0

## 2025-03-05 ENCOUNTER — APPOINTMENT (OUTPATIENT)
Dept: UROLOGY | Facility: CLINIC | Age: 85
End: 2025-03-05
Payer: MEDICARE

## 2025-03-05 VITALS — TEMPERATURE: 97.6 F | BODY MASS INDEX: 30.91 KG/M2 | HEIGHT: 62 IN

## 2025-03-05 DIAGNOSIS — N32.81 OAB (OVERACTIVE BLADDER): Primary | ICD-10-CM

## 2025-03-05 PROCEDURE — 64566 NEUROELTRD STIM POST TIBIAL: CPT | Performed by: PHYSICIAN ASSISTANT

## 2025-03-05 ASSESSMENT — ENCOUNTER SYMPTOMS
FREQUENCY: 1
CARDIOVASCULAR NEGATIVE: 1
EYES NEGATIVE: 1
ENDOCRINE NEGATIVE: 1
ALLERGIC/IMMUNOLOGIC NEGATIVE: 1
HEMATOLOGIC/LYMPHATIC NEGATIVE: 1
PSYCHIATRIC NEGATIVE: 1
RESPIRATORY NEGATIVE: 1
MUSCULOSKELETAL NEGATIVE: 1
CONSTITUTIONAL NEGATIVE: 1
GASTROINTESTINAL NEGATIVE: 1
NEUROLOGICAL NEGATIVE: 1

## 2025-03-05 ASSESSMENT — PAIN SCALES - GENERAL: PAINLEVEL_OUTOF10: 0-NO PAIN

## 2025-03-05 NOTE — PROGRESS NOTES
Subjective   Patient ID: Noy Ochoa is a 84 y.o. female who presents for Urinary Frequency (PTNS).  Urinary Frequency   Associated symptoms include frequency and urgency.     Patient is an 83 yo female with OAB, stress incontinence currently managed with Gemtessa and PTNS presents for treatment 12/12    Patient brought voiding. She is voiding every 2 hours day and night. She is using 2 pads a day She denies urgency incontinence.     Tried and failed PFPT x2, trospium, myrbetriq, and oxybutynin     Patient ID: Noy Ochoa is a 84 y.o. female.    PERCUTANEOUS TIBIAL NERVE STIMULATION    Date/Time: 3/5/2025 3:04 PM    Performed by: Patricia Sierra PA-C  Authorized by: Patricia Sierra PA-C    Procedure Details     Indications: urge incontinence, urinary frequency and urinary urgency      PTNS session #: 12    Bladder symptoms: unchanged      Ankle used: left      Stimulator intensity (mA): 1      Review of Systems   Constitutional: Negative.    HENT: Negative.     Eyes: Negative.    Respiratory: Negative.     Cardiovascular: Negative.    Gastrointestinal: Negative.    Endocrine: Negative.    Genitourinary:  Positive for frequency and urgency.   Musculoskeletal: Negative.    Skin: Negative.    Allergic/Immunologic: Negative.    Neurological: Negative.    Hematological: Negative.    Psychiatric/Behavioral: Negative.         Objective   Physical Exam  Constitutional:       General: She is not in acute distress.     Appearance: Normal appearance.   HENT:      Head: Normocephalic and atraumatic.      Nose: Nose normal.      Mouth/Throat:      Mouth: Mucous membranes are dry.   Cardiovascular:      Rate and Rhythm: Normal rate.   Pulmonary:      Effort: Pulmonary effort is normal.   Abdominal:      General: Abdomen is flat.      Palpations: Abdomen is soft.   Musculoskeletal:         General: Normal range of motion.      Cervical back: Normal range of motion and neck supple.   Skin:     General: Skin is warm and dry.    Neurological:      General: No focal deficit present.      Mental Status: She is alert and oriented to person, place, and time.   Psychiatric:         Mood and Affect: Mood normal.         Assessment/Plan     OAB    I discussed overall she has not had a great relief with PTNS. I discussed benefit from intravesical Botox. She will think about it.     I discussed if symptoms worsen we will start maintenance PTNS therapy.     Stress incontinence         Patricia Sierra PA-C 03/05/25 3:04 PM

## 2025-03-24 ENCOUNTER — TELEPHONE (OUTPATIENT)
Dept: PRIMARY CARE | Facility: CLINIC | Age: 85
End: 2025-03-24

## 2025-04-02 ENCOUNTER — APPOINTMENT (OUTPATIENT)
Dept: UROLOGY | Facility: CLINIC | Age: 85
End: 2025-04-02
Payer: MEDICARE

## 2025-04-02 VITALS — TEMPERATURE: 97.3 F

## 2025-04-02 DIAGNOSIS — N32.81 OAB (OVERACTIVE BLADDER): Primary | ICD-10-CM

## 2025-04-02 PROCEDURE — 99213 OFFICE O/P EST LOW 20 MIN: CPT | Performed by: PHYSICIAN ASSISTANT

## 2025-04-02 ASSESSMENT — ENCOUNTER SYMPTOMS
GASTROINTESTINAL NEGATIVE: 1
RESPIRATORY NEGATIVE: 1
EYES NEGATIVE: 1
CARDIOVASCULAR NEGATIVE: 1
FREQUENCY: 1
HEMATOLOGIC/LYMPHATIC NEGATIVE: 1
NEUROLOGICAL NEGATIVE: 1
CONSTITUTIONAL NEGATIVE: 1
PSYCHIATRIC NEGATIVE: 1
ALLERGIC/IMMUNOLOGIC NEGATIVE: 1
ENDOCRINE NEGATIVE: 1
MUSCULOSKELETAL NEGATIVE: 1

## 2025-04-02 ASSESSMENT — PAIN SCALES - GENERAL: PAINLEVEL_OUTOF10: 0-NO PAIN

## 2025-04-02 NOTE — PROGRESS NOTES
Subjective   Patient ID: Noy Ochoa is a 84 y.o. female who presents for Urinary Frequency (PTNS).  HPI  Patient is an 85 yo female with OAB, stress incontinence finished 12 treatments of PTNS seen for a follow up.     Patient denies any improvement of urinary symptoms after completing PTNS. She continues to void about every 2 hours. She is using 2 pads a day. Leakage is a combination of stress, urge and non sensory awareness.     She does not believe the PTNS helped her symptoms.     Tried and failed PFPT x2, trospium, myrbetriq, Gemtessa and oxybutynin   Review of Systems   Constitutional: Negative.    HENT: Negative.     Eyes: Negative.    Respiratory: Negative.     Cardiovascular: Negative.    Gastrointestinal: Negative.    Endocrine: Negative.    Genitourinary:  Positive for frequency and urgency.   Musculoskeletal: Negative.    Skin: Negative.    Allergic/Immunologic: Negative.    Neurological: Negative.    Hematological: Negative.    Psychiatric/Behavioral: Negative.         Objective   Physical Exam  Constitutional:       General: She is not in acute distress.     Appearance: Normal appearance.   HENT:      Head: Normocephalic and atraumatic.      Nose: Nose normal.      Mouth/Throat:      Mouth: Mucous membranes are dry.   Cardiovascular:      Rate and Rhythm: Normal rate.   Pulmonary:      Effort: Pulmonary effort is normal.   Abdominal:      General: Abdomen is flat.      Palpations: Abdomen is soft.   Musculoskeletal:         General: Normal range of motion.      Cervical back: Normal range of motion and neck supple.   Skin:     General: Skin is warm and dry.   Neurological:      General: No focal deficit present.      Mental Status: She is alert and oriented to person, place, and time.   Psychiatric:         Mood and Affect: Mood normal.       Assessment/Plan     OAB  Stress urinary incontinence    I discussed different treatment of OAB.  Patient tried and failed multiple medications  She tried and  failed PTNS  I discussed benefit of Botox.     She will follow up with Dr Quezada for further discussion             Patricia Sierra PA-C 04/02/25 2:33 PM

## 2025-05-23 ENCOUNTER — APPOINTMENT (OUTPATIENT)
Dept: UROLOGY | Facility: CLINIC | Age: 85
End: 2025-05-23
Payer: MEDICARE

## 2025-06-11 ENCOUNTER — APPOINTMENT (OUTPATIENT)
Dept: PRIMARY CARE | Facility: CLINIC | Age: 85
End: 2025-06-11
Payer: MEDICARE

## 2025-06-11 VITALS — DIASTOLIC BLOOD PRESSURE: 76 MMHG | SYSTOLIC BLOOD PRESSURE: 126 MMHG

## 2025-06-11 DIAGNOSIS — Z00.00 HEALTH CARE MAINTENANCE: Primary | ICD-10-CM

## 2025-06-11 DIAGNOSIS — Z12.11 ENCOUNTER FOR SCREENING FOR MALIGNANT NEOPLASM OF COLON: ICD-10-CM

## 2025-06-11 DIAGNOSIS — N39.41 URGE INCONTINENCE OF URINE: ICD-10-CM

## 2025-06-11 DIAGNOSIS — R26.89 POOR BALANCE: ICD-10-CM

## 2025-06-11 PROCEDURE — 3074F SYST BP LT 130 MM HG: CPT | Performed by: INTERNAL MEDICINE

## 2025-06-11 PROCEDURE — G2211 COMPLEX E/M VISIT ADD ON: HCPCS | Performed by: INTERNAL MEDICINE

## 2025-06-11 PROCEDURE — 99213 OFFICE O/P EST LOW 20 MIN: CPT | Performed by: INTERNAL MEDICINE

## 2025-06-11 PROCEDURE — 3078F DIAST BP <80 MM HG: CPT | Performed by: INTERNAL MEDICINE

## 2025-06-11 NOTE — PROGRESS NOTES
Subjective   Patient ID: Noy Ochoa is a 84 y.o. female who presents for No chief complaint on file..    HPI follow-up visit no chest pain no shortness of breath no new issues with the memory sometimes struggles with her medications son's been helpful in this regard she is considering Botox for her urinary incontinence after trying pelvic floor therapy and electrical stimulation there she would like to continue with regular physical therapy for balance and strengthening she may be due for Cologuard which she would like to repeat she needs a new disability placard for close of her cane when she goes to appointments    Review of Systems    Objective   There were no vitals taken for this visit.    Physical Exam  Vital signs noted alert and oriented NCAT no JVD chest clear to auscultation cor regular rate and rhythm S1-S2 extremities no clubbing cyanosis or edema normal distal pulses abdomen soft nontender normal active bowel sounds LS spine nontender spinous process negative straight leg raise walking with cane  Assessment/Plan        Impression gait and balance urine incontinence other diagnoses  Plan she will discuss with her urologist and likely end up having the Botox for the memory will continue with the Janet her previous laboratory results were reviewed she will continue with physical therapy requisition to be made for local physical therapy for gait and balance okay for Cologuard requisition to be made okay for handicap placard requisition made good diet regular exercise good water consumption recheck 3 months based on above    See prior

## 2025-06-13 ENCOUNTER — APPOINTMENT (OUTPATIENT)
Dept: UROLOGY | Facility: CLINIC | Age: 85
End: 2025-06-13
Payer: MEDICARE

## 2025-06-13 DIAGNOSIS — N39.41 URGE INCONTINENCE: Primary | ICD-10-CM

## 2025-06-13 PROCEDURE — 1160F RVW MEDS BY RX/DR IN RCRD: CPT | Performed by: SURGERY

## 2025-06-13 PROCEDURE — 1036F TOBACCO NON-USER: CPT | Performed by: SURGERY

## 2025-06-13 PROCEDURE — 99213 OFFICE O/P EST LOW 20 MIN: CPT | Performed by: SURGERY

## 2025-06-13 PROCEDURE — G2211 COMPLEX E/M VISIT ADD ON: HCPCS | Performed by: SURGERY

## 2025-06-13 PROCEDURE — 1159F MED LIST DOCD IN RCRD: CPT | Performed by: SURGERY

## 2025-06-13 PROCEDURE — 1126F AMNT PAIN NOTED NONE PRSNT: CPT | Performed by: SURGERY

## 2025-06-13 ASSESSMENT — PAIN SCALES - GENERAL: PAINLEVEL_OUTOF10: 0-NO PAIN

## 2025-06-13 NOTE — PROGRESS NOTES
Subjective   Patient ID: Noy Ochoa is a 84 y.o. female who presents for Follow-up.    HPI  She has a longstanding history of irritative LUTS and urge incontinence.  She previously trialed several oral medications, with no relief.  She then had 12 weeks of PTNS.  She saw no improvement in her symptoms.  She wears 2-3 pads throughout the day.  She also has nocturia.  She is quite bothered by her symptoms and they impact the quality of her life.  She previously worked on fluid management.  She is here to discuss third line options.                Objective   Physical Exam  Well nourished  Abdomen soft, ND, NT                    Assessment/Plan   Problem List Items Addressed This Visit    None  Visit Diagnoses         Codes      Urge incontinence    -  Primary N39.41    Relevant Orders    Urinalysis with Reflex Microscopic    Urine Culture    Cystoscopy    Follow Up In Urology             We discussed third line management options for urgent urinary incontinence.  I counseled her on Botox and sacral neuromodulation.  We discussed the risks and benefits of each option.  She is leaning towards Botox.  I will schedule her for first injection next month.    She will submit a urine for analysis and culture today.          Bonita Quezada MD 06/13/25 1:41 PM

## 2025-06-15 DIAGNOSIS — N30.00 ACUTE CYSTITIS WITHOUT HEMATURIA: Primary | ICD-10-CM

## 2025-06-15 LAB
APPEARANCE UR: ABNORMAL
BACTERIA #/AREA URNS HPF: ABNORMAL /HPF
BACTERIA UR CULT: ABNORMAL
BILIRUB UR QL STRIP: NEGATIVE
COLOR UR: YELLOW
GLUCOSE UR QL STRIP: NEGATIVE
HGB UR QL STRIP: NEGATIVE
HYALINE CASTS #/AREA URNS LPF: ABNORMAL /LPF
KETONES UR QL STRIP: NEGATIVE
LEUKOCYTE ESTERASE UR QL STRIP: ABNORMAL
NITRITE UR QL STRIP: POSITIVE
PH UR STRIP: 6 [PH] (ref 5–8)
PROT UR QL STRIP: NEGATIVE
RBC #/AREA URNS HPF: ABNORMAL /HPF
SERVICE CMNT-IMP: ABNORMAL
SP GR UR STRIP: 1.02 (ref 1–1.03)
SQUAMOUS #/AREA URNS HPF: ABNORMAL /HPF
WBC #/AREA URNS HPF: ABNORMAL /HPF

## 2025-06-15 RX ORDER — CEPHALEXIN 500 MG/1
500 CAPSULE ORAL 3 TIMES DAILY
Qty: 21 CAPSULE | Refills: 0 | Status: SHIPPED | OUTPATIENT
Start: 2025-06-15 | End: 2025-06-22

## 2025-06-26 DIAGNOSIS — I10 BENIGN ESSENTIAL HYPERTENSION: ICD-10-CM

## 2025-06-26 LAB — NONINV COLON CA DNA+OCC BLD SCRN STL QL: NEGATIVE

## 2025-06-26 RX ORDER — ATORVASTATIN CALCIUM 40 MG/1
40 TABLET, FILM COATED ORAL DAILY
Qty: 90 TABLET | Refills: 0 | Status: SHIPPED | OUTPATIENT
Start: 2025-06-26

## 2025-06-26 RX ORDER — AMLODIPINE BESYLATE 2.5 MG/1
2.5 TABLET ORAL DAILY
Qty: 90 TABLET | Refills: 0 | Status: SHIPPED | OUTPATIENT
Start: 2025-06-26

## 2025-06-30 ENCOUNTER — APPOINTMENT (OUTPATIENT)
Dept: UROLOGY | Facility: CLINIC | Age: 85
End: 2025-06-30
Payer: MEDICARE

## 2025-07-08 DIAGNOSIS — K21.9 GASTROESOPHAGEAL REFLUX DISEASE WITHOUT ESOPHAGITIS: ICD-10-CM

## 2025-07-11 ENCOUNTER — HOSPITAL ENCOUNTER (INPATIENT)
Facility: HOSPITAL | Age: 85
LOS: 1 days | Discharge: HOME | End: 2025-07-12
Attending: EMERGENCY MEDICINE | Admitting: STUDENT IN AN ORGANIZED HEALTH CARE EDUCATION/TRAINING PROGRAM
Payer: MEDICARE

## 2025-07-11 ENCOUNTER — APPOINTMENT (OUTPATIENT)
Dept: RADIOLOGY | Facility: HOSPITAL | Age: 85
End: 2025-07-11
Payer: MEDICARE

## 2025-07-11 ENCOUNTER — CLINICAL SUPPORT (OUTPATIENT)
Dept: EMERGENCY MEDICINE | Facility: HOSPITAL | Age: 85
End: 2025-07-11
Payer: MEDICARE

## 2025-07-11 DIAGNOSIS — T83.511A URINARY TRACT INFECTION ASSOCIATED WITH CATHETERIZATION OF URINARY TRACT, UNSPECIFIED INDWELLING URINARY CATHETER TYPE, INITIAL ENCOUNTER: Primary | ICD-10-CM

## 2025-07-11 DIAGNOSIS — N39.0 URINARY TRACT INFECTION ASSOCIATED WITH CATHETERIZATION OF URINARY TRACT, UNSPECIFIED INDWELLING URINARY CATHETER TYPE, INITIAL ENCOUNTER: Primary | ICD-10-CM

## 2025-07-11 DIAGNOSIS — R79.89 ELEVATED TROPONIN: ICD-10-CM
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
## 2025-07-11 LAB
ALBUMIN SERPL BCP-MCNC: 4.1 G/DL (ref 3.4–5)
ALP SERPL-CCNC: 56 U/L (ref 33–136)
ALT SERPL W P-5'-P-CCNC: 27 U/L (ref 7–45)
ANION GAP SERPL CALC-SCNC: 14 MMOL/L (ref 10–20)
APPEARANCE UR: ABNORMAL
AST SERPL W P-5'-P-CCNC: 20 U/L (ref 9–39)
BASOPHILS # BLD AUTO: 0.04 X10*3/UL (ref 0–0.1)
BASOPHILS NFR BLD AUTO: 0.3 %
BILIRUB SERPL-MCNC: 1.4 MG/DL (ref 0–1.2)
BILIRUB UR STRIP.AUTO-MCNC: NEGATIVE MG/DL
BUN SERPL-MCNC: 13 MG/DL (ref 6–23)
CALCIUM SERPL-MCNC: 9.1 MG/DL (ref 8.6–10.6)
CARDIAC TROPONIN I PNL SERPL HS: 58 NG/L (ref 0–34)
CARDIAC TROPONIN I PNL SERPL HS: 61 NG/L (ref 0–34)
CHLORIDE SERPL-SCNC: 102 MMOL/L (ref 98–107)
CO2 SERPL-SCNC: 25 MMOL/L (ref 21–32)
COLOR UR: ABNORMAL
CREAT SERPL-MCNC: 0.66 MG/DL (ref 0.5–1.05)
EGFRCR SERPLBLD CKD-EPI 2021: 88 ML/MIN/1.73M*2
EOSINOPHIL # BLD AUTO: 0.04 X10*3/UL (ref 0–0.4)
EOSINOPHIL NFR BLD AUTO: 0.3 %
ERYTHROCYTE [DISTWIDTH] IN BLOOD BY AUTOMATED COUNT: 13.4 % (ref 11.5–14.5)
GLUCOSE SERPL-MCNC: 132 MG/DL (ref 74–99)
GLUCOSE UR STRIP.AUTO-MCNC: NORMAL MG/DL
HCT VFR BLD AUTO: 35.3 % (ref 36–46)
HGB BLD-MCNC: 12.5 G/DL (ref 12–16)
IMM GRANULOCYTES # BLD AUTO: 0.09 X10*3/UL (ref 0–0.5)
IMM GRANULOCYTES NFR BLD AUTO: 0.6 % (ref 0–0.9)
KETONES UR STRIP.AUTO-MCNC: NEGATIVE MG/DL
LEUKOCYTE ESTERASE UR QL STRIP.AUTO: ABNORMAL
LYMPHOCYTES # BLD AUTO: 1.06 X10*3/UL (ref 0.8–3)
LYMPHOCYTES NFR BLD AUTO: 7 %
MCH RBC QN AUTO: 29.9 PG (ref 26–34)
MCHC RBC AUTO-ENTMCNC: 35.4 G/DL (ref 32–36)
MCV RBC AUTO: 84 FL (ref 80–100)
MONOCYTES # BLD AUTO: 1.42 X10*3/UL (ref 0.05–0.8)
MONOCYTES NFR BLD AUTO: 9.4 %
MUCOUS THREADS #/AREA URNS AUTO: ABNORMAL /LPF
NEUTROPHILS # BLD AUTO: 12.45 X10*3/UL (ref 1.6–5.5)
NEUTROPHILS NFR BLD AUTO: 82.4 %
NITRITE UR QL STRIP.AUTO: ABNORMAL
NRBC BLD-RTO: 0 /100 WBCS (ref 0–0)
PH UR STRIP.AUTO: 6.5 [PH]
PLATELET # BLD AUTO: 215 X10*3/UL (ref 150–450)
POTASSIUM SERPL-SCNC: 3.8 MMOL/L (ref 3.5–5.3)
PROT SERPL-MCNC: 7.3 G/DL (ref 6.4–8.2)
PROT UR STRIP.AUTO-MCNC: ABNORMAL MG/DL
RBC # BLD AUTO: 4.18 X10*6/UL (ref 4–5.2)
RBC # UR STRIP.AUTO: ABNORMAL MG/DL
RBC #/AREA URNS AUTO: ABNORMAL /HPF
SODIUM SERPL-SCNC: 137 MMOL/L (ref 136–145)
SP GR UR STRIP.AUTO: 1.01
SQUAMOUS #/AREA URNS AUTO: ABNORMAL /HPF
UROBILINOGEN UR STRIP.AUTO-MCNC: NORMAL MG/DL
WBC # BLD AUTO: 15.1 X10*3/UL (ref 4.4–11.3)
WBC #/AREA URNS AUTO: >50 /HPF

## 2025-07-11 PROCEDURE — 71046 X-RAY EXAM CHEST 2 VIEWS: CPT

## 2025-07-11 PROCEDURE — 80053 COMPREHEN METABOLIC PANEL: CPT

## 2025-07-11 PROCEDURE — 85025 COMPLETE CBC W/AUTO DIFF WBC: CPT

## 2025-07-11 PROCEDURE — 36415 COLL VENOUS BLD VENIPUNCTURE: CPT

## 2025-07-11 PROCEDURE — 81001 URINALYSIS AUTO W/SCOPE: CPT

## 2025-07-11 PROCEDURE — 84484 ASSAY OF TROPONIN QUANT: CPT

## 2025-07-11 PROCEDURE — 96361 HYDRATE IV INFUSION ADD-ON: CPT

## 2025-07-11 PROCEDURE — 87086 URINE CULTURE/COLONY COUNT: CPT

## 2025-07-11 PROCEDURE — 84484 ASSAY OF TROPONIN QUANT: CPT | Mod: 91

## 2025-07-11 PROCEDURE — 96365 THER/PROPH/DIAG IV INF INIT: CPT

## 2025-07-11 PROCEDURE — 99285 EMERGENCY DEPT VISIT HI MDM: CPT | Mod: 25 | Performed by: EMERGENCY MEDICINE

## 2025-07-11 PROCEDURE — 2500000004 HC RX 250 GENERAL PHARMACY W/ HCPCS (ALT 636 FOR OP/ED)

## 2025-07-11 PROCEDURE — 93005 ELECTROCARDIOGRAM TRACING: CPT

## 2025-07-11 PROCEDURE — 71046 X-RAY EXAM CHEST 2 VIEWS: CPT | Performed by: RADIOLOGY

## 2025-07-11 PROCEDURE — 84132 ASSAY OF SERUM POTASSIUM: CPT | Performed by: EMERGENCY MEDICINE

## 2025-07-11 RX ORDER — ALENDRONATE SODIUM 70 MG/1
70 TABLET ORAL
Qty: 12 TABLET | Refills: 0 | Status: ON HOLD | OUTPATIENT
Start: 2025-07-11

## 2025-07-11 RX ORDER — CEFTRIAXONE 1 G/50ML
1 INJECTION, SOLUTION INTRAVENOUS EVERY 24 HOURS
Status: DISCONTINUED | OUTPATIENT
Start: 2025-07-11 | End: 2025-07-12 | Stop reason: HOSPADM

## 2025-07-11 RX ADMIN — CEFTRIAXONE SODIUM 1 G: 1 INJECTION, SOLUTION INTRAVENOUS at 22:32

## 2025-07-11 RX ADMIN — SODIUM CHLORIDE, SODIUM LACTATE, POTASSIUM CHLORIDE, AND CALCIUM CHLORIDE 1000 ML: .6; .31; .03; .02 INJECTION, SOLUTION INTRAVENOUS at 20:55

## 2025-07-11 ASSESSMENT — PAIN SCALES - GENERAL
PAINLEVEL_OUTOF10: 0 - NO PAIN

## 2025-07-11 ASSESSMENT — PAIN - FUNCTIONAL ASSESSMENT
PAIN_FUNCTIONAL_ASSESSMENT: 0-10
PAIN_FUNCTIONAL_ASSESSMENT: 0-10

## 2025-07-11 NOTE — ED TRIAGE NOTES
Patient presents to the ED for c/c of frequent falls. Patient states that she has had 3 falls today and has been frequently falling for about a week. Patient is unsure why she is falling but reports it is not because she is passing out or tripping. She just feels weak. Patient is tachychardic and febrile in triage, denies urinary complaints, denies cough, chills, shortness of breath, chest pain. Patient uses a cane to ambulate at baseline. A+Ox4 GCS 15

## 2025-07-11 NOTE — ED PROVIDER NOTES
Emergency Department Provider Note       History of Present Illness     History provided by: Patient  Limitations to History: None  External Records Reviewed with Brief Summary: None    HPI:  Noy Ochoa is a 84 y.o. female with past medical history of HLD, HTN presenting today for 2 falls.  Patient had this past night getting out of bed, and a second fall this day when entering her house.  She notes that both falls were mechanical, stating that she did not have symptoms of lightheadedness or syncope.  Patient denies hitting her head on either fall, noting that she fell back and landed on her rear.  Patient notes she has been feeling some general weakness recently, but otherwise at this time has no complaints.  Patient denies any chest pain or shortness of breath, denies any headaches, denies any fevers, denies any nausea or vomiting.  Patient denies any abdominal pain or back pain.  Patient denies any pain in her extremities, neck, pelvis.    Physical Exam   Triage vitals:  T (!) 37.9 °C (100.3 °F)  HR (!) 123  /63  RR 18  O2 (!) 93 % None (Room air)    General: Awake, alert, in no acute distress  Eyes: Gaze conjugate.  No scleral icterus or injection.  PERRLA, EOMI  HENT: Normo-cephalic, atraumatic. No stridor  CV: Regular tachycardic rate, regular rhythm. Radial pulses 2+ bilaterally  Resp: Breathing non-labored, speaking in full sentences.  Clear to auscultation bilaterally  GI: Soft, non-distended, non-tender. No rebound or guarding.  MSK/Extremities: No gross bony deformities. Moving all extremities.  Bilateral upper and lower extremities are nontender to palpation.  Pelvis is stable, nontender.  No hematomas noted on the head, C-spine nontender to palpation.  Skin: Warm. Appropriate color  Neuro: Alert. Oriented. Face symmetric. Speech is fluent.  Gross strength and sensation intact in b/l UE and LEs  Psych: Appropriate mood and affect      Medical Decision Making & ED Course   Medical  Decision Makin y.o. female with past medical history of HLD, HTN presenting today for 2 falls.  Upon initial evaluation patient is tachycardic, minimally hypoxic on room air, but is well-appearing. ,  Differentials at this time include UTI, dehydration, ACS, electrolyte disturbance.  Will obtain labs and imaging to further evaluate.  EKG stable, CXR stable.  Patient provided IV fluid resuscitation.  CBC shows an elevated white count, increasing concern for infectious etiology.  Elevated troponin.  CMP unremarkable.  Repeat troponin still elevated.  UA shows evidence of UTI.  Patient started on ceftriaxone.  Patient informed of results and ultimately notified that she would likely need admission.  Patient will for admission evaluation.  Transfer order placed.  Patient signed out pending completion of transfer versus admission.  ----    Differential diagnoses considered include but are not limited to: As above    Social Determinants of Health which Significantly Impact Care: Social Determinants of Health which Significantly Impact Care: None identified     EKG Independent Interpretation: The EKG obtained at 2103 was independently interpreted by myself. It demonstrates sinus rhythm with a ventricular rate of 99.  Right superior axis. Intervals indicating widened QRS, with L BBB. ST segments showed no signs of elevation or depression, no STEMI.  Similar when compared to prior EKG from 10/1/2024.    Independent Result Review and Interpretation: As above    Chronic conditions affecting the patient's care: As documented above in [facility]    The patient was discussed with the following consultants/services: None    Care Considerations: As documented above in [facility]    ED Course:       Disposition   Patient was signed out to Dr. Pearce at 2300 pending completion of their work-up.  Please see the next provider's transition of care note for the remainder of the patient's care.     Procedures   Procedures    Patient seen and  discussed with ED attending physician.    Woody Romano DO  Emergency Medicine                                                       Woody Romano DO  Resident  07/12/25 0104

## 2025-07-12 ENCOUNTER — APPOINTMENT (OUTPATIENT)
Dept: RADIOLOGY | Facility: HOSPITAL | Age: 85
End: 2025-07-12
Payer: MEDICARE

## 2025-07-12 ENCOUNTER — HOSPITAL ENCOUNTER (INPATIENT)
Facility: HOSPITAL | Age: 85
DRG: 690 | End: 2025-07-12
Attending: INTERNAL MEDICINE | Admitting: STUDENT IN AN ORGANIZED HEALTH CARE EDUCATION/TRAINING PROGRAM
Payer: MEDICARE

## 2025-07-12 VITALS
OXYGEN SATURATION: 94 % | BODY MASS INDEX: 29.44 KG/M2 | WEIGHT: 160 LBS | RESPIRATION RATE: 20 BRPM | DIASTOLIC BLOOD PRESSURE: 64 MMHG | HEIGHT: 62 IN | SYSTOLIC BLOOD PRESSURE: 127 MMHG | TEMPERATURE: 99.1 F | HEART RATE: 66 BPM
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
## 2025-07-12 DIAGNOSIS — N39.0 UTI (URINARY TRACT INFECTION): Primary | ICD-10-CM

## 2025-07-12 PROBLEM — R09.02 HYPOXIA: Status: ACTIVE | Noted: 2025-07-12

## 2025-07-12 PROBLEM — F03.90 DEMENTIA WITHOUT BEHAVIORAL DISTURBANCE, PSYCHOTIC DISTURBANCE, MOOD DISTURBANCE, OR ANXIETY: Status: ACTIVE | Noted: 2024-08-13

## 2025-07-12 PROBLEM — N30.00 ACUTE CYSTITIS WITHOUT HEMATURIA: Status: ACTIVE | Noted: 2025-07-12

## 2025-07-12 PROBLEM — T83.511A URINARY TRACT INFECTION ASSOCIATED WITH CATHETERIZATION OF URINARY TRACT, UNSPECIFIED INDWELLING URINARY CATHETER TYPE, INITIAL ENCOUNTER: Status: ACTIVE | Noted: 2025-07-12
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
## 2025-07-12 PROBLEM — R41.841 COGNITIVE COMMUNICATION DISORDER: Status: ACTIVE | Noted: 2023-06-28

## 2025-07-12 LAB
ANION GAP BLDV CALCULATED.4IONS-SCNC: 12 MMOL/L (ref 10–25)
ATRIAL RATE: 99 BPM
BASE EXCESS BLDV CALC-SCNC: 2.1 MMOL/L (ref -2–3)
BODY TEMPERATURE: 37 DEGREES CELSIUS
CA-I BLDV-SCNC: 1.1 MMOL/L (ref 1.1–1.33)
CHLORIDE BLDV-SCNC: 102 MMOL/L (ref 98–107)
GLUCOSE BLDV-MCNC: 145 MG/DL (ref 74–99)
HCO3 BLDV-SCNC: 25.3 MMOL/L (ref 22–26)
HCT VFR BLD EST: 40 % (ref 36–46)
HGB BLDV-MCNC: 13.3 G/DL (ref 12–16)
LACTATE BLDV-SCNC: 0.8 MMOL/L (ref 0.4–2)
OXYHGB MFR BLDV: 91.3 % (ref 45–75)
P AXIS: 64 DEGREES
P OFFSET: 198 MS
P ONSET: 146 MS
PCO2 BLDV: 34 MM HG (ref 41–51)
PH BLDV: 7.48 PH (ref 7.33–7.43)
PO2 BLDV: 62 MM HG (ref 35–45)
POTASSIUM BLDV-SCNC: 3.8 MMOL/L (ref 3.5–5.3)
PR INTERVAL: 136 MS
Q ONSET: 214 MS
QRS COUNT: 17 BEATS
QRS DURATION: 138 MS
QT INTERVAL: 404 MS
QTC CALCULATION(BAZETT): 518 MS
QTC FREDERICIA: 477 MS
R AXIS: 260 DEGREES
SAO2 % BLDV: 94 % (ref 45–75)
SODIUM BLDV-SCNC: 135 MMOL/L (ref 136–145)
T AXIS: 65 DEGREES
T OFFSET: 416 MS
VENTRICULAR RATE: 99 BPM

## 2025-07-12 PROCEDURE — 36415 COLL VENOUS BLD VENIPUNCTURE: CPT | Performed by: NURSE PRACTITIONER

## 2025-07-12 PROCEDURE — 87040 BLOOD CULTURE FOR BACTERIA: CPT | Performed by: NURSE PRACTITIONER

## 2025-07-12 PROCEDURE — 2550000001 HC RX 255 CONTRASTS: Performed by: EMERGENCY MEDICINE

## 2025-07-12 PROCEDURE — 2500000004 HC RX 250 GENERAL PHARMACY W/ HCPCS (ALT 636 FOR OP/ED)

## 2025-07-12 PROCEDURE — 84132 ASSAY OF SERUM POTASSIUM: CPT

## 2025-07-12 PROCEDURE — 99223 1ST HOSP IP/OBS HIGH 75: CPT | Performed by: NURSE PRACTITIONER

## 2025-07-12 PROCEDURE — 71275 CT ANGIOGRAPHY CHEST: CPT | Performed by: RADIOLOGY

## 2025-07-12 PROCEDURE — 71275 CT ANGIOGRAPHY CHEST: CPT

## 2025-07-12 PROCEDURE — 1200000002 HC GENERAL ROOM WITH TELEMETRY DAILY

## 2025-07-12 PROCEDURE — 2500000001 HC RX 250 WO HCPCS SELF ADMINISTERED DRUGS (ALT 637 FOR MEDICARE OP): Performed by: NURSE PRACTITIONER

## 2025-07-12 PROCEDURE — 1210000001 HC SEMI-PRIVATE ROOM DAILY

## 2025-07-12 PROCEDURE — 96361 HYDRATE IV INFUSION ADD-ON: CPT

## 2025-07-12 PROCEDURE — 2500000002 HC RX 250 W HCPCS SELF ADMINISTERED DRUGS (ALT 637 FOR MEDICARE OP, ALT 636 FOR OP/ED): Performed by: NURSE PRACTITIONER

## 2025-07-12 PROCEDURE — 2500000001 HC RX 250 WO HCPCS SELF ADMINISTERED DRUGS (ALT 637 FOR MEDICARE OP): Performed by: EMERGENCY MEDICINE

## 2025-07-12 PROCEDURE — 2500000004 HC RX 250 GENERAL PHARMACY W/ HCPCS (ALT 636 FOR OP/ED): Performed by: NURSE PRACTITIONER

## 2025-07-12 RX ORDER — MEMANTINE HYDROCHLORIDE 5 MG/1
10 TABLET ORAL 2 TIMES DAILY
Status: DISCONTINUED | OUTPATIENT
Start: 2025-07-12 | End: 2025-07-14 | Stop reason: HOSPADM

## 2025-07-12 RX ORDER — ENOXAPARIN SODIUM 100 MG/ML
40 INJECTION SUBCUTANEOUS EVERY 24 HOURS
Status: DISCONTINUED | OUTPATIENT
Start: 2025-07-12 | End: 2025-07-12

## 2025-07-12 RX ORDER — SODIUM CHLORIDE, SODIUM LACTATE, POTASSIUM CHLORIDE, CALCIUM CHLORIDE 600; 310; 30; 20 MG/100ML; MG/100ML; MG/100ML; MG/100ML
75 INJECTION, SOLUTION INTRAVENOUS CONTINUOUS
Status: ACTIVE | OUTPATIENT
Start: 2025-07-12 | End: 2025-07-13

## 2025-07-12 RX ORDER — ATORVASTATIN CALCIUM 20 MG/1
40 TABLET, FILM COATED ORAL NIGHTLY
Status: DISCONTINUED | OUTPATIENT
Start: 2025-07-12 | End: 2025-07-12

## 2025-07-12 RX ORDER — CEFTRIAXONE 1 G/50ML
1 INJECTION, SOLUTION INTRAVENOUS EVERY 24 HOURS
Status: DISCONTINUED | OUTPATIENT
Start: 2025-07-12 | End: 2025-07-14 | Stop reason: HOSPADM

## 2025-07-12 RX ORDER — ENOXAPARIN SODIUM 100 MG/ML
40 INJECTION SUBCUTANEOUS EVERY 24 HOURS
Status: DISCONTINUED | OUTPATIENT
Start: 2025-07-12 | End: 2025-07-14 | Stop reason: HOSPADM

## 2025-07-12 RX ORDER — ACETAMINOPHEN 160 MG/5ML
650 SOLUTION ORAL EVERY 4 HOURS PRN
Status: DISCONTINUED | OUTPATIENT
Start: 2025-07-12 | End: 2025-07-14 | Stop reason: HOSPADM

## 2025-07-12 RX ORDER — POLYETHYLENE GLYCOL 3350 17 G/17G
17 POWDER, FOR SOLUTION ORAL DAILY PRN
Status: DISCONTINUED | OUTPATIENT
Start: 2025-07-12 | End: 2025-07-12

## 2025-07-12 RX ORDER — ACETAMINOPHEN 325 MG/1
650 TABLET ORAL EVERY 4 HOURS PRN
Status: DISCONTINUED | OUTPATIENT
Start: 2025-07-12 | End: 2025-07-14 | Stop reason: HOSPADM

## 2025-07-12 RX ORDER — AMLODIPINE BESYLATE 5 MG/1
2.5 TABLET ORAL DAILY
Status: DISCONTINUED | OUTPATIENT
Start: 2025-07-12 | End: 2025-07-14 | Stop reason: HOSPADM

## 2025-07-12 RX ORDER — ACETAMINOPHEN 325 MG/1
975 TABLET ORAL ONCE
Status: COMPLETED | OUTPATIENT
Start: 2025-07-12 | End: 2025-07-12

## 2025-07-12 RX ORDER — CALCIUM CARBONATE 500(1250)
1250 TABLET ORAL DAILY
Status: DISCONTINUED | OUTPATIENT
Start: 2025-07-12 | End: 2025-07-12

## 2025-07-12 RX ORDER — SERTRALINE HYDROCHLORIDE 50 MG/1
100 TABLET, FILM COATED ORAL DAILY
Status: DISCONTINUED | OUTPATIENT
Start: 2025-07-12 | End: 2025-07-14 | Stop reason: HOSPADM

## 2025-07-12 RX ORDER — MULTIVIT-MIN/IRON FUM/FOLIC AC 7.5 MG-4
1 TABLET ORAL DAILY
Status: DISCONTINUED | OUTPATIENT
Start: 2025-07-12 | End: 2025-07-12

## 2025-07-12 RX ORDER — ATORVASTATIN CALCIUM 40 MG/1
40 TABLET, FILM COATED ORAL NIGHTLY
Status: DISCONTINUED | OUTPATIENT
Start: 2025-07-12 | End: 2025-07-14 | Stop reason: HOSPADM

## 2025-07-12 RX ORDER — ACETAMINOPHEN 650 MG/1
650 SUPPOSITORY RECTAL EVERY 4 HOURS PRN
Status: DISCONTINUED | OUTPATIENT
Start: 2025-07-12 | End: 2025-07-14 | Stop reason: HOSPADM

## 2025-07-12 RX ORDER — MEMANTINE HYDROCHLORIDE 5 MG/1
10 TABLET ORAL 2 TIMES DAILY
Status: DISCONTINUED | OUTPATIENT
Start: 2025-07-12 | End: 2025-07-12

## 2025-07-12 RX ORDER — CALCIUM CARBONATE 500(1250)
1250 TABLET ORAL DAILY
Status: DISCONTINUED | OUTPATIENT
Start: 2025-07-12 | End: 2025-07-14 | Stop reason: HOSPADM

## 2025-07-12 RX ORDER — AMLODIPINE BESYLATE 5 MG/1
2.5 TABLET ORAL DAILY
Status: DISCONTINUED | OUTPATIENT
Start: 2025-07-12 | End: 2025-07-12

## 2025-07-12 RX ORDER — CHOLECALCIFEROL (VITAMIN D3) 25 MCG
125 TABLET ORAL DAILY
Status: DISCONTINUED | OUTPATIENT
Start: 2025-07-12 | End: 2025-07-14 | Stop reason: HOSPADM

## 2025-07-12 RX ADMIN — SODIUM CHLORIDE, SODIUM LACTATE, POTASSIUM CHLORIDE, AND CALCIUM CHLORIDE 75 ML/HR: .6; .31; .03; .02 INJECTION, SOLUTION INTRAVENOUS at 18:08

## 2025-07-12 RX ADMIN — SODIUM CHLORIDE, SODIUM LACTATE, POTASSIUM CHLORIDE, AND CALCIUM CHLORIDE 1000 ML: .6; .31; .03; .02 INJECTION, SOLUTION INTRAVENOUS at 04:06

## 2025-07-12 RX ADMIN — MEMANTINE 10 MG: 5 TABLET ORAL at 20:46

## 2025-07-12 RX ADMIN — ACETAMINOPHEN 975 MG: 325 TABLET ORAL at 05:10

## 2025-07-12 RX ADMIN — ENOXAPARIN SODIUM 40 MG: 100 INJECTION SUBCUTANEOUS at 17:12

## 2025-07-12 RX ADMIN — Medication 125 MCG: at 17:12

## 2025-07-12 RX ADMIN — SERTRALINE 100 MG: 50 TABLET, FILM COATED ORAL at 17:12

## 2025-07-12 RX ADMIN — IOHEXOL 80 ML: 350 INJECTION, SOLUTION INTRAVENOUS at 02:56

## 2025-07-12 RX ADMIN — ATORVASTATIN CALCIUM 40 MG: 40 TABLET, FILM COATED ORAL at 20:46

## 2025-07-12 RX ADMIN — CALCIUM 1 TABLET: 500 TABLET ORAL at 17:12

## 2025-07-12 RX ADMIN — CEFTRIAXONE 1 G: 1 INJECTION, SOLUTION INTRAVENOUS at 20:46

## 2025-07-12 SDOH — SOCIAL STABILITY: SOCIAL INSECURITY
WITHIN THE LAST YEAR, HAVE YOU BEEN KICKED, HIT, SLAPPED, OR OTHERWISE PHYSICALLY HURT BY YOUR PARTNER OR EX-PARTNER?: NO

## 2025-07-12 SDOH — SOCIAL STABILITY: SOCIAL INSECURITY: ABUSE: ADULT

## 2025-07-12 SDOH — SOCIAL STABILITY: SOCIAL INSECURITY
WITHIN THE LAST YEAR, HAVE YOU BEEN RAPED OR FORCED TO HAVE ANY KIND OF SEXUAL ACTIVITY BY YOUR PARTNER OR EX-PARTNER?: NO

## 2025-07-12 SDOH — SOCIAL STABILITY: SOCIAL INSECURITY: ARE THERE ANY APPARENT SIGNS OF INJURIES/BEHAVIORS THAT COULD BE RELATED TO ABUSE/NEGLECT?: NO

## 2025-07-12 SDOH — ECONOMIC STABILITY: INCOME INSECURITY: IN THE PAST 12 MONTHS HAS THE ELECTRIC, GAS, OIL, OR WATER COMPANY THREATENED TO SHUT OFF SERVICES IN YOUR HOME?: NO

## 2025-07-12 SDOH — SOCIAL STABILITY: SOCIAL INSECURITY: WITHIN THE LAST YEAR, HAVE YOU BEEN HUMILIATED OR EMOTIONALLY ABUSED IN OTHER WAYS BY YOUR PARTNER OR EX-PARTNER?: NO

## 2025-07-12 SDOH — ECONOMIC STABILITY: HOUSING INSECURITY: IN THE PAST 12 MONTHS, HOW MANY TIMES HAVE YOU MOVED WHERE YOU WERE LIVING?: 0

## 2025-07-12 SDOH — ECONOMIC STABILITY: HOUSING INSECURITY: IN THE LAST 12 MONTHS, WAS THERE A TIME WHEN YOU WERE NOT ABLE TO PAY THE MORTGAGE OR RENT ON TIME?: NO

## 2025-07-12 SDOH — SOCIAL STABILITY: SOCIAL INSECURITY: WITHIN THE LAST YEAR, HAVE YOU BEEN AFRAID OF YOUR PARTNER OR EX-PARTNER?: NO

## 2025-07-12 SDOH — SOCIAL STABILITY: SOCIAL INSECURITY: ARE YOU OR HAVE YOU BEEN THREATENED OR ABUSED PHYSICALLY, EMOTIONALLY, OR SEXUALLY BY ANYONE?: NO

## 2025-07-12 SDOH — ECONOMIC STABILITY: FOOD INSECURITY: WITHIN THE PAST 12 MONTHS, YOU WORRIED THAT YOUR FOOD WOULD RUN OUT BEFORE YOU GOT THE MONEY TO BUY MORE.: NEVER TRUE

## 2025-07-12 SDOH — SOCIAL STABILITY: SOCIAL INSECURITY: DOES ANYONE TRY TO KEEP YOU FROM HAVING/CONTACTING OTHER FRIENDS OR DOING THINGS OUTSIDE YOUR HOME?: NO

## 2025-07-12 SDOH — SOCIAL STABILITY: SOCIAL INSECURITY: HAVE YOU HAD ANY THOUGHTS OF HARMING ANYONE ELSE?: NO

## 2025-07-12 SDOH — ECONOMIC STABILITY: TRANSPORTATION INSECURITY: IN THE PAST 12 MONTHS, HAS LACK OF TRANSPORTATION KEPT YOU FROM MEDICAL APPOINTMENTS OR FROM GETTING MEDICATIONS?: NO

## 2025-07-12 SDOH — ECONOMIC STABILITY: FOOD INSECURITY: WITHIN THE PAST 12 MONTHS, THE FOOD YOU BOUGHT JUST DIDN'T LAST AND YOU DIDN'T HAVE MONEY TO GET MORE.: NEVER TRUE

## 2025-07-12 SDOH — ECONOMIC STABILITY: HOUSING INSECURITY: AT ANY TIME IN THE PAST 12 MONTHS, WERE YOU HOMELESS OR LIVING IN A SHELTER (INCLUDING NOW)?: NO

## 2025-07-12 SDOH — SOCIAL STABILITY: SOCIAL INSECURITY: WERE YOU ABLE TO COMPLETE ALL THE BEHAVIORAL HEALTH SCREENINGS?: YES

## 2025-07-12 SDOH — SOCIAL STABILITY: SOCIAL INSECURITY: DO YOU FEEL ANYONE HAS EXPLOITED OR TAKEN ADVANTAGE OF YOU FINANCIALLY OR OF YOUR PERSONAL PROPERTY?: NO

## 2025-07-12 SDOH — SOCIAL STABILITY: SOCIAL INSECURITY: DO YOU FEEL UNSAFE GOING BACK TO THE PLACE WHERE YOU ARE LIVING?: NO

## 2025-07-12 SDOH — SOCIAL STABILITY: SOCIAL INSECURITY: HAVE YOU HAD THOUGHTS OF HARMING ANYONE ELSE?: NO

## 2025-07-12 SDOH — ECONOMIC STABILITY: FOOD INSECURITY: HOW HARD IS IT FOR YOU TO PAY FOR THE VERY BASICS LIKE FOOD, HOUSING, MEDICAL CARE, AND HEATING?: NOT VERY HARD

## 2025-07-12 SDOH — SOCIAL STABILITY: SOCIAL INSECURITY: HAS ANYONE EVER THREATENED TO HURT YOUR FAMILY OR YOUR PETS?: NO

## 2025-07-12 ASSESSMENT — ACTIVITIES OF DAILY LIVING (ADL)
WALKS IN HOME: NEEDS ASSISTANCE
HEARING - LEFT EAR: FUNCTIONAL
DRESSING YOURSELF: NEEDS ASSISTANCE
LACK_OF_TRANSPORTATION: NO
ADEQUATE_TO_COMPLETE_ADL: YES
TOILETING: NEEDS ASSISTANCE
PATIENT'S MEMORY ADEQUATE TO SAFELY COMPLETE DAILY ACTIVITIES?: YES
GROOMING: NEEDS ASSISTANCE
HEARING - RIGHT EAR: FUNCTIONAL
BATHING: NEEDS ASSISTANCE
LACK_OF_TRANSPORTATION: NO
FEEDING YOURSELF: INDEPENDENT
JUDGMENT_ADEQUATE_SAFELY_COMPLETE_DAILY_ACTIVITIES: YES

## 2025-07-12 ASSESSMENT — ENCOUNTER SYMPTOMS
CONSTITUTIONAL NEGATIVE: 1
MUSCULOSKELETAL NEGATIVE: 1
GASTROINTESTINAL NEGATIVE: 1
PSYCHIATRIC NEGATIVE: 1
CARDIOVASCULAR NEGATIVE: 1
RESPIRATORY NEGATIVE: 1

## 2025-07-12 ASSESSMENT — PATIENT HEALTH QUESTIONNAIRE - PHQ9
1. LITTLE INTEREST OR PLEASURE IN DOING THINGS: NOT AT ALL
2. FEELING DOWN, DEPRESSED OR HOPELESS: NOT AT ALL
SUM OF ALL RESPONSES TO PHQ9 QUESTIONS 1 & 2: 0

## 2025-07-12 ASSESSMENT — COGNITIVE AND FUNCTIONAL STATUS - GENERAL
PERSONAL GROOMING: A LITTLE
CLIMB 3 TO 5 STEPS WITH RAILING: A LOT
TOILETING: A LITTLE
TURNING FROM BACK TO SIDE WHILE IN FLAT BAD: A LITTLE
PATIENT BASELINE BEDBOUND: NO
STANDING UP FROM CHAIR USING ARMS: A LITTLE
HELP NEEDED FOR BATHING: A LITTLE
EATING MEALS: A LITTLE
MOVING TO AND FROM BED TO CHAIR: A LITTLE
DRESSING REGULAR LOWER BODY CLOTHING: A LITTLE
MOBILITY SCORE: 17
DAILY ACTIVITIY SCORE: 18
WALKING IN HOSPITAL ROOM: A LITTLE
MOVING FROM LYING ON BACK TO SITTING ON SIDE OF FLAT BED WITH BEDRAILS: A LITTLE
DRESSING REGULAR UPPER BODY CLOTHING: A LITTLE

## 2025-07-12 ASSESSMENT — PAIN SCALES - GENERAL
PAINLEVEL_OUTOF10: 0 - NO PAIN

## 2025-07-12 ASSESSMENT — PAIN - FUNCTIONAL ASSESSMENT
PAIN_FUNCTIONAL_ASSESSMENT: 0-10

## 2025-07-12 ASSESSMENT — LIFESTYLE VARIABLES
HOW OFTEN DO YOU HAVE 6 OR MORE DRINKS ON ONE OCCASION: NEVER
AUDIT-C TOTAL SCORE: 0
SKIP TO QUESTIONS 9-10: 1
HOW OFTEN DO YOU HAVE A DRINK CONTAINING ALCOHOL: NEVER
HOW MANY STANDARD DRINKS CONTAINING ALCOHOL DO YOU HAVE ON A TYPICAL DAY: PATIENT DOES NOT DRINK
AUDIT-C TOTAL SCORE: 0

## 2025-07-12 NOTE — CARE PLAN
The patient's goals for the shift include  rest    The clinical goals for the shift include maintain pt safety    Over the shift, the patient did make progress toward the following goals.     Problem: Pain - Adult  Goal: Verbalizes/displays adequate comfort level or baseline comfort level  Outcome: Progressing  Flowsheets (Taken 7/12/2025 1749)  Verbalizes/displays adequate comfort level or baseline comfort level:   Encourage patient to monitor pain and request assistance   Assess pain using appropriate pain scale   Administer analgesics based on type and severity of pain and evaluate response   Implement non-pharmacological measures as appropriate and evaluate response   Consider cultural and social influences on pain and pain management     Problem: Safety - Adult  Goal: Free from fall injury  Outcome: Progressing  Flowsheets (Taken 7/12/2025 1749)  Free from fall injury:   Instruct family/caregiver on patient safety   Based on caregiver fall risk screen, instruct family/caregiver to ask for assistance with transferring infant if caregiver noted to have fall risk factors     Problem: Discharge Planning  Goal: Discharge to home or other facility with appropriate resources  Outcome: Progressing  Flowsheets (Taken 7/12/2025 1749)  Discharge to home or other facility with appropriate resources:   Identify barriers to discharge with patient and caregiver   Arrange for needed discharge resources and transportation as appropriate   Identify discharge learning needs (meds, wound care, etc)   Arrange for interpreters to assist at discharge as needed     Problem: Chronic Conditions and Co-morbidities  Goal: Patient's chronic conditions and co-morbidity symptoms are monitored and maintained or improved  Outcome: Progressing  Flowsheets (Taken 7/12/2025 1749)  Care Plan - Patient's Chronic Conditions and Co-Morbidity Symptoms are Monitored and Maintained or Improved:   Collaborate with multidisciplinary team to address  chronic and comorbid conditions and prevent exacerbation or deterioration   Monitor and assess patient's chronic conditions and comorbid symptoms for stability, deterioration, or improvement   Update acute care plan with appropriate goals if chronic or comorbid symptoms are exacerbated and prevent overall improvement and discharge     Problem: Nutrition  Goal: Nutrient intake appropriate for maintaining nutritional needs  Outcome: Progressing     Problem: Fall/Injury  Goal: Not fall by end of shift  Outcome: Progressing  Goal: Be free from injury by end of the shift  Outcome: Progressing  Goal: Verbalize understanding of personal risk factors for fall in the hospital  Outcome: Progressing  Goal: Verbalize understanding of risk factor reduction measures to prevent injury from fall in the home  Outcome: Progressing  Goal: Use assistive devices by end of the shift  Outcome: Progressing  Goal: Pace activities to prevent fatigue by end of the shift  Outcome: Progressing

## 2025-07-12 NOTE — NURSING NOTE
Pt arrived from [facility]. A&Ox4, RA. Able to make needs known. Attempted to notify Dr May of pt's arrival-left message to return call to obtain orders.

## 2025-07-12 NOTE — H&P
History Of Present Illness  Noy Ochoa is a 84 y.o. female with a past medical history of hypertension, hypercholesterolemia, anxiety, depression, TBI, cognitive communication deficit, urge incontinence follows with urology presenting as a transfer from [facility].  Patient presented to the emergency department there yesterday after 2 falls.  Patient had a fall when getting out of bed and also when entering her house.  Patient denies lightheaded or dizziness.  Patient did admit to generalized leg weakness.  Patient was febrile with temp of 100.3, tachycardic heart rate of 123, and shortness of breath on arrival [facility] ED.  A CT angio chest for PE was completed that showed No evidence of acute pulmonary embolism.  Mild scattered ground-glass airspace opacity in the lungs may reflect minor residual infectious or inflammatory process versus sequelae of prior infection as seen on prior imaging from 2023.  Small hiatal hernia.  Of note patient's son had stated patient was evaluated by a family friend that is a nurse and her blood pressure was noted to be low, she felt warm and had an SpO2 of 90 prior to coming to the hospital.  Patient admitted for further workup.     Past Medical History  She has a past medical history of Anxiety, Combined forms of age-related cataract, left eye (09/06/2022), Combined forms of age-related cataract, right eye (09/06/2022), Depression, unspecified (02/23/2022), Hyperlipidemia, Hypertension, Macular drusen, bilateral, OAB (overactive bladder), and Urinary tract infection.    Surgical History  She has a past surgical history that includes Cholecystectomy (10/11/2013); Hysterectomy (10/11/2013); Tonsillectomy (10/11/2013); Cataract extraction w/  intraocular lens implant (Left, 05/02/2024); and Cataract extraction w/  intraocular lens implant (Right, 05/30/2024).     Social History  She reports that she has never smoked. She has never been exposed to tobacco smoke. She has never used  smokeless tobacco. She reports that she does not currently use alcohol. She reports that she does not use drugs.    Family History  Family History[1]     Allergies  Aspirin, Food extracts, Violet, Latex, and Lidocaine    Review of Systems   Constitutional: Negative.    HENT: Negative.     Respiratory: Negative.     Cardiovascular: Negative.    Gastrointestinal: Negative.    Genitourinary: Negative.    Musculoskeletal: Negative.    Psychiatric/Behavioral: Negative.          Physical Exam  HENT:      Head: Normocephalic.      Mouth/Throat:      Mouth: Mucous membranes are moist.   Cardiovascular:      Rate and Rhythm: Normal rate.   Pulmonary:      Effort: Pulmonary effort is normal.   Abdominal:      General: Bowel sounds are normal.      Palpations: Abdomen is soft.   Musculoskeletal:         General: Normal range of motion.      Cervical back: Neck supple.   Skin:     General: Skin is warm.   Neurological:      Mental Status: She is alert and oriented to person, place, and time.   Psychiatric:         Mood and Affect: Mood normal.         Behavior: Behavior normal.          Last Recorded Vitals  /71 (BP Location: Right arm, Patient Position: Lying)   Pulse 71   Temp 37.1 °C (98.8 °F) (Temporal)   Resp 20   SpO2 94%     Relevant Results        Results for orders placed or performed during the hospital encounter of 07/11/25 (from the past 24 hours)   CBC and Auto Differential   Result Value Ref Range    WBC 15.1 (H) 4.4 - 11.3 x10*3/uL    nRBC 0.0 0.0 - 0.0 /100 WBCs    RBC 4.18 4.00 - 5.20 x10*6/uL    Hemoglobin 12.5 12.0 - 16.0 g/dL    Hematocrit 35.3 (L) 36.0 - 46.0 %    MCV 84 80 - 100 fL    MCH 29.9 26.0 - 34.0 pg    MCHC 35.4 32.0 - 36.0 g/dL    RDW 13.4 11.5 - 14.5 %    Platelets 215 150 - 450 x10*3/uL    Neutrophils % 82.4 40.0 - 80.0 %    Immature Granulocytes %, Automated 0.6 0.0 - 0.9 %    Lymphocytes % 7.0 13.0 - 44.0 %    Monocytes % 9.4 2.0 - 10.0 %    Eosinophils % 0.3 0.0 - 6.0 %     Basophils % 0.3 0.0 - 2.0 %    Neutrophils Absolute 12.45 (H) 1.60 - 5.50 x10*3/uL    Immature Granulocytes Absolute, Automated 0.09 0.00 - 0.50 x10*3/uL    Lymphocytes Absolute 1.06 0.80 - 3.00 x10*3/uL    Monocytes Absolute 1.42 (H) 0.05 - 0.80 x10*3/uL    Eosinophils Absolute 0.04 0.00 - 0.40 x10*3/uL    Basophils Absolute 0.04 0.00 - 0.10 x10*3/uL   Comprehensive metabolic panel   Result Value Ref Range    Glucose 132 (H) 74 - 99 mg/dL    Sodium 137 136 - 145 mmol/L    Potassium 3.8 3.5 - 5.3 mmol/L    Chloride 102 98 - 107 mmol/L    Bicarbonate 25 21 - 32 mmol/L    Anion Gap 14 10 - 20 mmol/L    Urea Nitrogen 13 6 - 23 mg/dL    Creatinine 0.66 0.50 - 1.05 mg/dL    eGFR 88 >60 mL/min/1.73m*2    Calcium 9.1 8.6 - 10.6 mg/dL    Albumin 4.1 3.4 - 5.0 g/dL    Alkaline Phosphatase 56 33 - 136 U/L    Total Protein 7.3 6.4 - 8.2 g/dL    AST 20 9 - 39 U/L    Bilirubin, Total 1.4 (H) 0.0 - 1.2 mg/dL    ALT 27 7 - 45 U/L   Troponin I, High Sensitivity, Initial   Result Value Ref Range    Troponin I, High Sensitivity (CMC) 58 (H) 0 - 34 ng/L   Urinalysis with Reflex Culture and Microscopic   Result Value Ref Range    Color, Urine Light-Orange (N) Light-Yellow, Yellow, Dark-Yellow    Appearance, Urine Turbid (N) Clear    Specific Gravity, Urine 1.011 1.005 - 1.035    pH, Urine 6.5 5.0, 5.5, 6.0, 6.5, 7.0, 7.5, 8.0    Protein, Urine 50 (1+) (A) NEGATIVE, 10 (TRACE), 20 (TRACE) mg/dL    Glucose, Urine Normal Normal mg/dL    Blood, Urine 0.06 (1+) (A) NEGATIVE mg/dL    Ketones, Urine NEGATIVE NEGATIVE mg/dL    Bilirubin, Urine NEGATIVE NEGATIVE mg/dL    Urobilinogen, Urine Normal Normal mg/dL    Nitrite, Urine 2+ (A) NEGATIVE    Leukocyte Esterase, Urine 500 Viola/uL (A) NEGATIVE   Microscopic Only, Urine   Result Value Ref Range    WBC, Urine >50 (A) 1-5, NONE /HPF    RBC, Urine 6-10 (A) NONE, 1-2, 3-5 /HPF    Squamous Epithelial Cells, Urine 1-9 (SPARSE) Reference range not established. /HPF    Mucus, Urine FEW Reference range  not established. /LPF   Troponin, High Sensitivity, 1 Hour   Result Value Ref Range    Troponin I, High Sensitivity (CMC) 61 (H) 0 - 34 ng/L   ECG 12 lead   Result Value Ref Range    Ventricular Rate 99 BPM    Atrial Rate 99 BPM    RI Interval 136 ms    QRS Duration 138 ms    QT Interval 404 ms    QTC Calculation(Bazett) 518 ms    P Axis 64 degrees    R Axis 260 degrees    T Axis 65 degrees    QRS Count 17 beats    Q Onset 214 ms    P Onset 146 ms    P Offset 198 ms    T Offset 416 ms    QTC Fredericia 477 ms   Blood Culture    Specimen: Peripheral Venipuncture; Blood culture   Result Value Ref Range    Blood Culture Loaded on Instrument - Culture in progress    Blood Culture    Specimen: Peripheral Venipuncture; Blood culture   Result Value Ref Range    Blood Culture Loaded on Instrument - Culture in progress            Assessment/Plan   Assessment & Plan  UTI (urinary tract infection)  Falls     Noy Ochoa is a 84 y.o. female with a past medical history of hypertension, hypercholesterolemia, anxiety, depression, TBI, cognitive communication deficit, urge incontinence follows with urology presenting as a transfer from [facility].  Patient presented to the emergency department there yesterday after 2 falls.  Patient had a fall when getting out of bed and also when entering her house.  Patient denies lightheaded or dizziness.  Patient did admit to generalized leg weakness.  Patient was febrile with temp of 100.3, tachycardic heart rate of 123, and shortness of breath on arrival [facility] ED.  A CT angio chest for PE was completed that showed No evidence of acute pulmonary embolism.  Mild scattered ground-glass airspace opacity in the lungs may reflect minor residual infectious or inflammatory process versus sequelae of prior infection as seen on prior imaging from 2023.  Small hiatal hernia.  Of note patient's son had stated patient was evaluated by a family friend that is a nurse and her blood pressure was noted to be  low, she felt warm and had an SpO2 of 90 prior to coming to the hospital.  Patient admitted for further workup.      Plan:  Continue IV Rocephin  Follow urine culture  PT/OT  Telemetry    Hypertension-orthostatic vital signs taken, patient was noted to drop from 140-111 systolic, will hold amlodipine and give gentle IV hydration overnight, recheck orthostatics in a.m.    Hypercholesterolemia-atorvastatin    anxiety, depression-Zoloft    cognitive communication deficit-Namenda    DVT prophylaxis-Lovenox       Sven Saravia, APRN-CNP         [1]   Family History  Problem Relation Name Age of Onset    Colon cancer Mother      Diabetes Mother      Hypertension Mother      Krystal-Pick disease Father      Heart attack Brother      Diabetes Brother      Stroke Brother

## 2025-07-13 LAB
ANION GAP BLDV CALCULATED.4IONS-SCNC: 10 MMOL/L (ref 10–25)
ANION GAP SERPL CALC-SCNC: 15 MMOL/L (ref 10–20)
BACTERIA BLD CULT: NORMAL
BACTERIA BLD CULT: NORMAL
BACTERIA UR CULT: ABNORMAL
BASE EXCESS BLDV CALC-SCNC: 4.7 MMOL/L (ref -2–3)
BASOPHILS # BLD AUTO: 0.05 X10*3/UL (ref 0–0.1)
BASOPHILS NFR BLD AUTO: 0.5 %
BODY TEMPERATURE: 37 DEGREES CELSIUS
BUN SERPL-MCNC: 10 MG/DL (ref 6–23)
CA-I BLDV-SCNC: 1.14 MMOL/L (ref 1.1–1.33)
CALCIUM SERPL-MCNC: 8.3 MG/DL (ref 8.6–10.3)
CHLORIDE BLDV-SCNC: 101 MMOL/L (ref 98–107)
CHLORIDE SERPL-SCNC: 105 MMOL/L (ref 98–107)
CO2 SERPL-SCNC: 22 MMOL/L (ref 21–32)
CREAT SERPL-MCNC: 0.68 MG/DL (ref 0.5–1.05)
EGFRCR SERPLBLD CKD-EPI 2021: 87 ML/MIN/1.73M*2
EOSINOPHIL # BLD AUTO: 0.12 X10*3/UL (ref 0–0.4)
EOSINOPHIL NFR BLD AUTO: 1.3 %
ERYTHROCYTE [DISTWIDTH] IN BLOOD BY AUTOMATED COUNT: 13.8 % (ref 11.5–14.5)
GLUCOSE BLDV-MCNC: 136 MG/DL (ref 74–99)
GLUCOSE SERPL-MCNC: 118 MG/DL (ref 74–99)
HCO3 BLDV-SCNC: 29.2 MMOL/L (ref 22–26)
HCT VFR BLD AUTO: 32.9 % (ref 36–46)
HCT VFR BLD EST: 40 % (ref 36–46)
HGB BLD-MCNC: 11.2 G/DL (ref 12–16)
HGB BLDV-MCNC: 13.4 G/DL (ref 12–16)
IMM GRANULOCYTES # BLD AUTO: 0.09 X10*3/UL (ref 0–0.5)
IMM GRANULOCYTES NFR BLD AUTO: 1 % (ref 0–0.9)
INHALED O2 CONCENTRATION: 21 %
LACTATE BLDV-SCNC: 1 MMOL/L (ref 0.4–2)
LYMPHOCYTES # BLD AUTO: 1.16 X10*3/UL (ref 0.8–3)
LYMPHOCYTES NFR BLD AUTO: 12.7 %
MCH RBC QN AUTO: 29.9 PG (ref 26–34)
MCHC RBC AUTO-ENTMCNC: 34 G/DL (ref 32–36)
MCV RBC AUTO: 88 FL (ref 80–100)
MONOCYTES # BLD AUTO: 1.02 X10*3/UL (ref 0.05–0.8)
MONOCYTES NFR BLD AUTO: 11.1 %
NEUTROPHILS # BLD AUTO: 6.71 X10*3/UL (ref 1.6–5.5)
NEUTROPHILS NFR BLD AUTO: 73.4 %
NRBC BLD-RTO: 0 /100 WBCS (ref 0–0)
OXYHGB MFR BLDV: 44.6 % (ref 45–75)
PCO2 BLDV: 42 MM HG (ref 41–51)
PH BLDV: 7.45 PH (ref 7.33–7.43)
PLATELET # BLD AUTO: 205 X10*3/UL (ref 150–450)
PO2 BLDV: 28 MM HG (ref 35–45)
POTASSIUM BLDV-SCNC: 4.1 MMOL/L (ref 3.5–5.3)
POTASSIUM SERPL-SCNC: 3.4 MMOL/L (ref 3.5–5.3)
RBC # BLD AUTO: 3.75 X10*6/UL (ref 4–5.2)
SAO2 % BLDV: 46 % (ref 45–75)
SODIUM BLDV-SCNC: 136 MMOL/L (ref 136–145)
SODIUM SERPL-SCNC: 139 MMOL/L (ref 136–145)
WBC # BLD AUTO: 9.2 X10*3/UL (ref 4.4–11.3)

## 2025-07-13 PROCEDURE — 97165 OT EVAL LOW COMPLEX 30 MIN: CPT | Mod: GO

## 2025-07-13 PROCEDURE — 2500000001 HC RX 250 WO HCPCS SELF ADMINISTERED DRUGS (ALT 637 FOR MEDICARE OP): Performed by: NURSE PRACTITIONER

## 2025-07-13 PROCEDURE — 1200000002 HC GENERAL ROOM WITH TELEMETRY DAILY

## 2025-07-13 PROCEDURE — 2500000004 HC RX 250 GENERAL PHARMACY W/ HCPCS (ALT 636 FOR OP/ED): Performed by: STUDENT IN AN ORGANIZED HEALTH CARE EDUCATION/TRAINING PROGRAM

## 2025-07-13 PROCEDURE — 36415 COLL VENOUS BLD VENIPUNCTURE: CPT | Performed by: NURSE PRACTITIONER

## 2025-07-13 PROCEDURE — 2500000004 HC RX 250 GENERAL PHARMACY W/ HCPCS (ALT 636 FOR OP/ED): Performed by: NURSE PRACTITIONER

## 2025-07-13 PROCEDURE — 80048 BASIC METABOLIC PNL TOTAL CA: CPT | Performed by: NURSE PRACTITIONER

## 2025-07-13 PROCEDURE — 99232 SBSQ HOSP IP/OBS MODERATE 35: CPT | Performed by: STUDENT IN AN ORGANIZED HEALTH CARE EDUCATION/TRAINING PROGRAM

## 2025-07-13 PROCEDURE — 2500000002 HC RX 250 W HCPCS SELF ADMINISTERED DRUGS (ALT 637 FOR MEDICARE OP, ALT 636 FOR OP/ED): Performed by: NURSE PRACTITIONER

## 2025-07-13 PROCEDURE — 85025 COMPLETE CBC W/AUTO DIFF WBC: CPT | Performed by: NURSE PRACTITIONER

## 2025-07-13 RX ORDER — SODIUM CHLORIDE, SODIUM LACTATE, POTASSIUM CHLORIDE, CALCIUM CHLORIDE 600; 310; 30; 20 MG/100ML; MG/100ML; MG/100ML; MG/100ML
75 INJECTION, SOLUTION INTRAVENOUS CONTINUOUS
Status: ACTIVE | OUTPATIENT
Start: 2025-07-13 | End: 2025-07-14

## 2025-07-13 RX ADMIN — MEMANTINE 10 MG: 5 TABLET ORAL at 20:27

## 2025-07-13 RX ADMIN — ENOXAPARIN SODIUM 40 MG: 100 INJECTION SUBCUTANEOUS at 16:47

## 2025-07-13 RX ADMIN — CEFTRIAXONE 1 G: 1 INJECTION, SOLUTION INTRAVENOUS at 20:27

## 2025-07-13 RX ADMIN — ATORVASTATIN CALCIUM 40 MG: 40 TABLET, FILM COATED ORAL at 20:27

## 2025-07-13 RX ADMIN — SODIUM CHLORIDE, SODIUM LACTATE, POTASSIUM CHLORIDE, AND CALCIUM CHLORIDE 75 ML/HR: .6; .31; .03; .02 INJECTION, SOLUTION INTRAVENOUS at 12:46

## 2025-07-13 RX ADMIN — CALCIUM 1 TABLET: 500 TABLET ORAL at 08:32

## 2025-07-13 RX ADMIN — MEMANTINE 10 MG: 5 TABLET ORAL at 08:32

## 2025-07-13 RX ADMIN — Medication 125 MCG: at 08:32

## 2025-07-13 RX ADMIN — SERTRALINE 100 MG: 50 TABLET, FILM COATED ORAL at 08:32

## 2025-07-13 ASSESSMENT — COGNITIVE AND FUNCTIONAL STATUS - GENERAL
EATING MEALS: A LITTLE
DRESSING REGULAR UPPER BODY CLOTHING: A LITTLE
DRESSING REGULAR LOWER BODY CLOTHING: A LITTLE
TOILETING: A LOT
HELP NEEDED FOR BATHING: A LITTLE
DAILY ACTIVITIY SCORE: 17
PERSONAL GROOMING: A LITTLE

## 2025-07-13 ASSESSMENT — PAIN - FUNCTIONAL ASSESSMENT: PAIN_FUNCTIONAL_ASSESSMENT: 0-10

## 2025-07-13 ASSESSMENT — PAIN SCALES - GENERAL: PAINLEVEL_OUTOF10: 0 - NO PAIN

## 2025-07-13 ASSESSMENT — ACTIVITIES OF DAILY LIVING (ADL): ADL_ASSISTANCE: INDEPENDENT

## 2025-07-13 NOTE — PROGRESS NOTES
Occupational Therapy    Evaluation    Patient Name: Noy Ochoa  MRN: [id number]  Department: [facility]  Room: [address] [place] [address]  Today's Date: 7/13/2025  Time Calculation  Start Time: 0943  Stop Time: 1005  Time Calculation (min): 22 min        Assessment:  OT Assessment: Pt presents with reduced safety awareness, balance, strength and endurance, impeding ADL performance and functional mobility. Pt would benefit from skilled OT services to address these deficits and facilitate highest level of function. Monitor skilled therapy progress of pt since pt may progress to Low Int.  Prognosis: Good  Barriers to Discharge Home: Caregiver assistance, Cognition needs  Caregiver Assistance: Patient lives alone and/or does not have reliable caregiver assistance  Cognition Needs: Insight of patient limited regarding functional ability/needs  Evaluation/Treatment Tolerance: Other (Comment) (Cognition)  Medical Staff Made Aware: Yes  End of Session Communication: Bedside nurse  End of Session Patient Position: Up in chair, Alarm on  OT Assessment Results: Decreased ADL status, Decreased safe judgment during ADL, Decreased endurance, Decreased functional mobility, Decreased IADLs  Prognosis: Good  Barriers to Discharge: Decreased caregiver support  Evaluation/Treatment Tolerance: Other (Comment) (Cognition)  Medical Staff Made Aware: Yes  Strengths: Ability to acquire knowledge, Capable of completing ADLs semi/independent, Housing layout, Premorbid level of function  Barriers to Participation: Comorbidities, Insight into problems  Plan:  Treatment Interventions: ADL retraining, Functional transfer training, UE strengthening/ROM, Endurance training, Patient/family training, Equipment evaluation/education, Compensatory technique education  OT Frequency: 3 times per week (During this acute inpatient hospitalization.)  OT Discharge Recommendations: Moderate intensity level of continued care (Based on current functional status and rehab  potential, patient is anticipated to tolerate and benefit from 5 or more days per week of skilled rehabilitative therapy after discharge from this acute inpatient hospitalization.)  Equipment Recommended upon Discharge: Wheeled walker  OT Recommended Transfer Status: Assist of 1  OT - OK to Discharge: Yes (Per POC)  Treatment Interventions: ADL retraining, Functional transfer training, UE strengthening/ROM, Endurance training, Patient/family training, Equipment evaluation/education, Compensatory technique education    Subjective     OT Visit Info:  OT Received On: 07/13/25  General:  General  Reason for Referral: 84 y.o. female presenting with frequent falls, generalized weakness and found to have + UTI.  Referred By: Sven Saravia, APRN-CNP  Past Medical History Relevant to Rehab: Medical History[1]  Family/Caregiver Present: No  Prior to Session Communication: Bedside nurse  Patient Position Received: Bed, 3 rail up, Alarm on  Preferred Learning Style: auditory, verbal, visual  General Comment: Pt agreeable and cooperative to OT evaluation, intermittently pleasantly confused.  Precautions:  Medical Precautions: Fall precautions     Date/Time Vitals Session Patient Position Pulse Resp SpO2 BP MAP (mmHg)    07/13/25 0945 --  --  --  --  --  153/69  97     07/13/25 0950 --  --  --  --  --  140/68  92     07/13/25 0955 --  --  --  --  --  133/73  93                 Pain:  Pain Assessment  Pain Assessment: 0-10  0-10 (Numeric) Pain Score: 0 - No pain    Objective   Cognition:  Overall Cognitive Status:  (Mild cognitive impairment)  Orientation Level: Oriented X4  Safety Judgment: Decreased awareness of need for safety precautions  Insight: Mild  Impulsive: Mildly           Home Living:  Type of Home: House  Lives With: Alone  Home Adaptive Equipment: Cane  Home Layout: One level, Laundry in basement  Home Access: Stairs to enter with rails  Entrance Stairs-Rails: Both  Entrance Stairs-Number of Steps: 2 (Pt reporting  "uncertain about number of stairs)  Bathroom Shower/Tub: Walk-in shower  Bathroom Toilet: Handicapped height  Bathroom Equipment: Grab bars in shower, Shower chair with back  Prior Function:  Level of [place]: Independent with ADLs and functional transfers, Needs assistance with homemaking  Receives Help From: Personal care attendant (HHA present 5 days a week for 5 hours)  ADL Assistance: Independent  Homemaking Assistance: Needs assistance (HHA completes laundry, cleaning, cooking, and grocery shopping.)  Driving/Transportation:  (Pt reports + drives)  Ambulatory Assistance: Independent (PRN cane)     ADL:  Grooming Assistance: Stand by  Grooming Deficit: Verbal cueing, Supervision/safety, Increased time to complete, Wash/dry hands, Wash/dry face, Teeth care  UE Dressing Assistance: Minimal  UE Dressing Deficit:  (Don hospital gown backward)  LE Dressing Assistance: Stand by  LE Dressing Deficit: Steadying, Supervision/safety, Increased time to complete, Thread RLE into underwear, Thread LLE into underwear, Pull up over hips (Pt demonstrating ability to manage BLE socks in figure four position. CGA for bobby briefs.)  Toileting Assistance with Device: Maximal  Toileting Deficit:  (Purewick and brief on, brief soiled.)  Activity Tolerance:  Endurance: Tolerates 10 - 20 min exercise with multiple rests  Bed Mobility/Transfers: Bed Mobility  Bed Mobility: Yes  Bed Mobility 1  Bed Mobility 1: Supine to sitting, Scooting  Level of Assistance 1: Close supervision  Bed Mobility Comments 1: HOB slightly elevated, able to manage BLE/trunk toward EOB    Transfers  Transfer: Yes  Transfer 1  Technique 1: Sit to stand, Stand to sit  Transfer Level of Assistance 1: Contact guard  Trials/Comments 1: Pt held onto bedrail and chair arm rest upon standing for steadying. Educated on use of FWW, pt reporting \"does not need walker\" at this time.      Functional Mobility:  Functional Mobility  Functional Mobility Performed: " Yes  Functional Mobility 1  Functional Mobility Support Devices: Gait belt  Assistance 1: Minimum assistance  Comments 1: Pt completed x min household distance functional mobility with intermittent furtniture/wall walking, requiring Min A for safety, steadying/balance. Pt educated on use of FWW for stability and safety of functional mobility. RN reporting when gotten up earlier, use of FWW due to unsteadiness.  Sitting Balance:  Static Sitting Balance  Static Sitting-Balance Support: Feet supported  Static Sitting-Level of Assistance: Close supervision, Distant supervision  Static Sitting-Comment/Number of Minutes: EOB  Dynamic Sitting Balance  Dynamic Sitting-Balance Support: Feet supported  Dynamic Sitting-Level of Assistance: Close supervision  Dynamic Sitting-Balance: Forward lean, Reaching for objects, Reaching across midline  Dynamic Sitting-Comments: EOB  Standing Balance:  Static Standing Balance  Static Standing-Balance Support:  (No UE and unilateral UE support)  Static Standing-Level of Assistance: Contact guard  Static Standing-Comment/Number of Minutes: Completing ADL tasks at bedside and standing sinkside  Dynamic Standing Balance  Dynamic Standing-Balance Support:  (No UE and unilateral UE support)  Dynamic Standing-Level of Assistance: Contact guard, Minimum assistance  Dynamic Standing-Balance: Reaching for objects, Reaching across midline, Turning  Dynamic Standing-Comments: Completing ADL tasks at bedside and standing sinkside      Vision:Vision - Basic Assessment  Current Vision: No visual deficits  Sensation:  Light Touch: No apparent deficits  Strength:  Strength Comments: WFL shoulder flexion  Perception:  Inattention/Neglect: Appears intact  Coordination:  Movements are Fluid and Coordinated: Yes  Coordination Comment: Appears intact   Hand Function:  Gross Grasp: Functional  Coordination: Functional  Extremities: RUE   RUE : Within Functional Limits  RUE Strength  RUE Overall Strength:  Within Functional Limits - able to perform ADL tasks with strength and LUE   LUE: Within Functional Limits  LUE Strength  LUE Overall Strength: Within Functional Limits - able to perform ADL tasks with strength    Outcome Measures:[facility] Daily Activity  Putting on and taking off regular lower body clothing: A little  Bathing (including washing, rinsing, drying): A little  Putting on and taking off regular upper body clothing: A little  Toileting, which includes using toilet, bedpan or urinal: A lot  Taking care of personal grooming such as brushing teeth: A little  Eating Meals: A little  Daily Activity - Total Score: 17        Education Documentation  Body Mechanics, taught by Melissa [place] OT at 7/13/2025 11:37 AM.  Learner: Patient  Readiness: Acceptance  Method: Explanation  Response: Verbalizes Understanding, Needs Reinforcement    Precautions, taught by Melissa [place] OT at 7/13/2025 11:37 AM.  Learner: Patient  Readiness: Acceptance  Method: Explanation  Response: Verbalizes Understanding, Needs Reinforcement    ADL Training, taught by Melissa [place] OT at 7/13/2025 11:37 AM.  Learner: Patient  Readiness: Acceptance  Method: Explanation  Response: Verbalizes Understanding, Needs Reinforcement    Education Comments  No comments found.        OP EDUCATION:       Goals:  Encounter Problems       Encounter Problems (Active)       ADLs       Patient will perform UB and LB bathing with modified independent level of assistance and shower chair and long-handled sponge.       Start:  07/13/25    Expected End:  07/27/25            Patient with complete lower body dressing with modified independent level of assistance donning and doffing all LE clothes  with PRN adaptive equipment.       Start:  07/13/25    Expected End:  07/27/25            Patient will complete toileting including hygiene clothing management/hygiene with independent level of assistance and raised toilet seat and grab bars.       Start:  07/13/25     Expected End:  07/27/25            Patient will demonstrate good safety awareness, implement use of adaptive equipment/device prn and maintain proper body mechanics while completing ADL tasks to promote highest level of function during ADL performance and functional mobility.       Start:  07/13/25    Expected End:  07/27/25                   BALANCE       Pt will maintain dynamic standing balance during ADL task with modified independent level of assistance in order to demonstrate decreased risk of falling and improved postural control.       Start:  07/13/25    Expected End:  07/27/25               MOBILITY       Patient will perform Functional mobility x Household distances/Community Distances with modified independent level of assistance and least restrictive device in order to improve safety and functional mobility.       Start:  07/13/25    Expected End:  07/27/25               TRANSFERS       Patient will complete sit to stand transfer with modified independent level of assistance and least restrictive device in order to improve safety and prepare for out of bed mobility.       Start:  07/13/25    Expected End:  07/27/25                                     [1]   Past Medical History:  Diagnosis Date    Anxiety     Combined forms of age-related cataract, left eye 09/06/2022    Combined form of age-related cataract, left eye    Combined forms of age-related cataract, right eye 09/06/2022    Combined form of age-related cataract, right eye    Depression, unspecified 02/23/2022    Depression    Hyperlipidemia     Hypertension     Macular drusen, bilateral     OAB (overactive bladder)     Urinary tract infection

## 2025-07-13 NOTE — CARE PLAN
The patient's goals for the shift include work with pt/ot    The clinical goals for the shift include maintain pt safety    Over the shift, the patient did make progress toward the following goals.     Problem: Pain - Adult  Goal: Verbalizes/displays adequate comfort level or baseline comfort level  Outcome: Progressing  Flowsheets (Taken 7/13/2025 1242)  Verbalizes/displays adequate comfort level or baseline comfort level:   Encourage patient to monitor pain and request assistance   Assess pain using appropriate pain scale   Administer analgesics based on type and severity of pain and evaluate response   Implement non-pharmacological measures as appropriate and evaluate response   Consider cultural and social influences on pain and pain management     Problem: Safety - Adult  Goal: Free from fall injury  Outcome: Progressing  Flowsheets (Taken 7/13/2025 1242)  Free from fall injury:   Based on caregiver fall risk screen, instruct family/caregiver to ask for assistance with transferring infant if caregiver noted to have fall risk factors   Instruct family/caregiver on patient safety     Problem: Discharge Planning  Goal: Discharge to home or other facility with appropriate resources  Outcome: Progressing  Flowsheets (Taken 7/13/2025 1242)  Discharge to home or other facility with appropriate resources:   Identify barriers to discharge with patient and caregiver   Arrange for needed discharge resources and transportation as appropriate   Identify discharge learning needs (meds, wound care, etc)   Arrange for interpreters to assist at discharge as needed     Problem: Chronic Conditions and Co-morbidities  Goal: Patient's chronic conditions and co-morbidity symptoms are monitored and maintained or improved  Outcome: Progressing  Flowsheets (Taken 7/13/2025 1242)  Care Plan - Patient's Chronic Conditions and Co-Morbidity Symptoms are Monitored and Maintained or Improved:   Monitor and assess patient's chronic conditions  and comorbid symptoms for stability, deterioration, or improvement   Collaborate with multidisciplinary team to address chronic and comorbid conditions and prevent exacerbation or deterioration   Update acute care plan with appropriate goals if chronic or comorbid symptoms are exacerbated and prevent overall improvement and discharge     Problem: Nutrition  Goal: Nutrient intake appropriate for maintaining nutritional needs  Outcome: Progressing     Problem: Fall/Injury  Goal: Not fall by end of shift  Outcome: Progressing  Goal: Be free from injury by end of the shift  Outcome: Progressing  Goal: Verbalize understanding of personal risk factors for fall in the hospital  Outcome: Progressing  Goal: Verbalize understanding of risk factor reduction measures to prevent injury from fall in the home  Outcome: Progressing  Goal: Use assistive devices by end of the shift  Outcome: Progressing  Goal: Pace activities to prevent fatigue by end of the shift  Outcome: Progressing

## 2025-07-13 NOTE — PROGRESS NOTES
[facility] Hospitalist Progress Note      Between 7AM-7PM please message me via Epic Secure Chat.  After 7PM please page Nocturnist on call.        Assessment/Plan     Acute Problems    UTI  Frequent falls  Orthostatic hypotension    Chronic Problems    HTN  Urge incontinence  History TBI  Anxiety  depression    Plan    - continue IV rocephin  - follow urine culture  - PT/OT  - received fluids overnight for orthostatic hypotension, still orthostatic today, LR restarted    Fluids: LR 75 ml/hr  Electrolytes: Replete as needed  Nutrition: cardiac  Cuevas: none  Invasive lines: none  Drains: none  O2: none    DVT Prophylaxis:  SubQ Lovenox    Discharge Planning: pending PT/OT recs    Plan of care was discussed with patient    Total time spent: At least 38 minutes, providing counseling or in coordination of care. Total time on this day of visit includes record and documentation review before and after visit including documentation and time not explicitly included on EMR time stamp.      Subjective     Noy Ochoa is a 84 y.o. female on day 1 of admission presenting with UTI (urinary tract infection).    Patient denies any acute issues today, up to chair, denies any significant dizziness or vertigo    Review of Systems  ROS as noted in subjective portion of note     Objective     Physical Exam  Vitals and nursing note reviewed.   Constitutional:       General: She is not in acute distress.     Appearance: Normal appearance.   HENT:      Head: Normocephalic and atraumatic.   Eyes:      General: No scleral icterus.     Extraocular Movements: Extraocular movements intact.      Pupils: Pupils are equal, round, and reactive to light.   Cardiovascular:      Rate and Rhythm: Normal rate and regular rhythm.      Pulses: Normal pulses.      Heart sounds: Normal heart sounds.   Pulmonary:      Effort: Pulmonary effort is normal.      Breath sounds: Normal breath sounds. No wheezing, rhonchi or rales.   Abdominal:      General: Abdomen  "is flat. Bowel sounds are normal. There is no distension.      Tenderness: There is no abdominal tenderness. There is no guarding.   Musculoskeletal:         General: Swelling (bilateral LE nonpitting edema) present. No tenderness. Normal range of motion.   Skin:     General: Skin is warm and dry.      Findings: No erythema or rash.   Neurological:      General: No focal deficit present.      Mental Status: She is alert and oriented to person, place, and time.         Last Recorded Vitals  Blood pressure 135/73, pulse 95, temperature 36.5 °C (97.7 °F), temperature source Temporal, resp. rate 18, height 1.575 m (5' 2\"), weight 72.6 kg (160 lb), SpO2 97%.    Medications  Scheduled Medications[1]   PRN Medications[2]                Stone Araujo [facility] Medicine         [1] [Held by provider] amLODIPine, 2.5 mg, oral, Daily  atorvastatin, 40 mg, oral, Nightly  calcium carbonate, 1,250 mg of calcium carbonate, oral, Daily  cefTRIAXone, 1 g, intravenous, q24h  cholecalciferol, 125 mcg, oral, Daily  enoxaparin, 40 mg, subcutaneous, q24h  memantine, 10 mg, oral, BID  sertraline, 100 mg, oral, Daily  [2] PRN medications: acetaminophen **OR** acetaminophen **OR** acetaminophen    "

## 2025-07-14 ENCOUNTER — HOME HEALTH ADMISSION (OUTPATIENT)
Dept: HOME HEALTH SERVICES | Facility: HOME HEALTH | Age: 85
End: 2025-07-14
Payer: MEDICARE

## 2025-07-14 ENCOUNTER — DOCUMENTATION (OUTPATIENT)
Dept: HOME HEALTH SERVICES | Facility: HOME HEALTH | Age: 85
End: 2025-07-14
Payer: MEDICARE

## 2025-07-14 VITALS
WEIGHT: 160 LBS | HEART RATE: 92 BPM | BODY MASS INDEX: 29.44 KG/M2 | RESPIRATION RATE: 19 BRPM | SYSTOLIC BLOOD PRESSURE: 167 MMHG | TEMPERATURE: 97.9 F | OXYGEN SATURATION: 93 % | DIASTOLIC BLOOD PRESSURE: 83 MMHG | HEIGHT: 62 IN

## 2025-07-14 VITALS
RESPIRATION RATE: 17 BRPM | BODY MASS INDEX: 29.44 KG/M2 | SYSTOLIC BLOOD PRESSURE: 140 MMHG | OXYGEN SATURATION: 91 % | TEMPERATURE: 98.1 F | HEIGHT: 62 IN | HEART RATE: 86 BPM | DIASTOLIC BLOOD PRESSURE: 68 MMHG | WEIGHT: 160 LBS

## 2025-07-14 LAB
BACTERIA UR CULT: ABNORMAL
HOLD SPECIMEN: NORMAL

## 2025-07-14 PROCEDURE — 2500000002 HC RX 250 W HCPCS SELF ADMINISTERED DRUGS (ALT 637 FOR MEDICARE OP, ALT 636 FOR OP/ED): Performed by: STUDENT IN AN ORGANIZED HEALTH CARE EDUCATION/TRAINING PROGRAM

## 2025-07-14 PROCEDURE — 97161 PT EVAL LOW COMPLEX 20 MIN: CPT | Mod: GP

## 2025-07-14 PROCEDURE — 2500000001 HC RX 250 WO HCPCS SELF ADMINISTERED DRUGS (ALT 637 FOR MEDICARE OP): Performed by: NURSE PRACTITIONER

## 2025-07-14 PROCEDURE — 2500000001 HC RX 250 WO HCPCS SELF ADMINISTERED DRUGS (ALT 637 FOR MEDICARE OP): Performed by: STUDENT IN AN ORGANIZED HEALTH CARE EDUCATION/TRAINING PROGRAM

## 2025-07-14 PROCEDURE — 99239 HOSP IP/OBS DSCHRG MGMT >30: CPT | Performed by: STUDENT IN AN ORGANIZED HEALTH CARE EDUCATION/TRAINING PROGRAM

## 2025-07-14 PROCEDURE — 2500000002 HC RX 250 W HCPCS SELF ADMINISTERED DRUGS (ALT 637 FOR MEDICARE OP, ALT 636 FOR OP/ED): Performed by: NURSE PRACTITIONER

## 2025-07-14 RX ORDER — METOPROLOL TARTRATE 1 MG/ML
5 INJECTION, SOLUTION INTRAVENOUS EVERY 6 HOURS PRN
Status: DISCONTINUED | OUTPATIENT
Start: 2025-07-14 | End: 2025-07-14 | Stop reason: HOSPADM

## 2025-07-14 RX ORDER — CEPHALEXIN 500 MG/1
500 CAPSULE ORAL 4 TIMES DAILY
Qty: 12 CAPSULE | Refills: 0 | Status: SHIPPED | OUTPATIENT
Start: 2025-07-14 | End: 2025-07-17

## 2025-07-14 RX ORDER — POTASSIUM CHLORIDE 20 MEQ/1
40 TABLET, EXTENDED RELEASE ORAL ONCE
Status: COMPLETED | OUTPATIENT
Start: 2025-07-14 | End: 2025-07-14

## 2025-07-14 RX ADMIN — POTASSIUM CHLORIDE 40 MEQ: 1500 TABLET, EXTENDED RELEASE ORAL at 09:52

## 2025-07-14 RX ADMIN — CALCIUM 1 TABLET: 500 TABLET ORAL at 09:52

## 2025-07-14 RX ADMIN — AMLODIPINE BESYLATE 2.5 MG: 5 TABLET ORAL at 09:52

## 2025-07-14 RX ADMIN — MEMANTINE 10 MG: 5 TABLET ORAL at 09:52

## 2025-07-14 RX ADMIN — Medication 125 MCG: at 09:52

## 2025-07-14 RX ADMIN — SERTRALINE 100 MG: 50 TABLET, FILM COATED ORAL at 09:52

## 2025-07-14 ASSESSMENT — COGNITIVE AND FUNCTIONAL STATUS - GENERAL
MOVING FROM LYING ON BACK TO SITTING ON SIDE OF FLAT BED WITH BEDRAILS: A LITTLE
MOBILITY SCORE: 17
PERSONAL GROOMING: A LITTLE
MOVING TO AND FROM BED TO CHAIR: A LITTLE
MOVING TO AND FROM BED TO CHAIR: A LITTLE
WALKING IN HOSPITAL ROOM: A LITTLE
MOBILITY SCORE: 18
CLIMB 3 TO 5 STEPS WITH RAILING: A LITTLE
TOILETING: A LITTLE
DRESSING REGULAR UPPER BODY CLOTHING: A LITTLE
HELP NEEDED FOR BATHING: A LITTLE
DAILY ACTIVITIY SCORE: 19
STANDING UP FROM CHAIR USING ARMS: A LITTLE
MOVING FROM LYING ON BACK TO SITTING ON SIDE OF FLAT BED WITH BEDRAILS: A LITTLE
DRESSING REGULAR LOWER BODY CLOTHING: A LITTLE
CLIMB 3 TO 5 STEPS WITH RAILING: A LOT
TURNING FROM BACK TO SIDE WHILE IN FLAT BAD: A LITTLE
WALKING IN HOSPITAL ROOM: A LITTLE
TURNING FROM BACK TO SIDE WHILE IN FLAT BAD: A LITTLE
STANDING UP FROM CHAIR USING ARMS: A LITTLE

## 2025-07-14 ASSESSMENT — PAIN - FUNCTIONAL ASSESSMENT
PAIN_FUNCTIONAL_ASSESSMENT: 0-10
PAIN_FUNCTIONAL_ASSESSMENT: 0-10

## 2025-07-14 ASSESSMENT — ACTIVITIES OF DAILY LIVING (ADL)
ADL_ASSISTANCE: INDEPENDENT
LACK_OF_TRANSPORTATION: NO

## 2025-07-14 ASSESSMENT — PAIN SCALES - GENERAL
PAINLEVEL_OUTOF10: 0 - NO PAIN
PAINLEVEL_OUTOF10: 0 - NO PAIN

## 2025-07-14 NOTE — HH CARE COORDINATION
Home Care received a Referral for Nursing, Physical Therapy, and Occupational Therapy. We have processed the referral for a Start of Care on 7/16 - 7/17/25.     If you have any questions or concerns, please feel free to contact us at [phone]. Follow the prompts, enter your five digit zip code, and you will be directed to your care team on CENTL 1.

## 2025-07-14 NOTE — DISCHARGE SUMMARY
Discharge Diagnosis  UTI (urinary tract infection)     Issues Requiring Follow-Up  Work with home PT/OT  Initiate home wheeled walker  Complete antibiotics -> cephalexin 500 mg QID x 3 more days    Discharge Meds     Medication List      START taking these medications     cephalexin 500 mg capsule; Commonly known as: Keflex; Take 1 capsule   (500 mg) by mouth 4 times a day for 3 days.     CONTINUE taking these medications     alendronate 70 mg tablet; Commonly known as: Fosamax; TAKE 1 TABLET BY   MOUTH EVERY 7 DAYS   amLODIPine 2.5 mg tablet; Commonly known as: Norvasc; TAKE 1 TABLET BY   MOUTH ONCE DAILY.   ascorbic acid (vitamin C) 500 mg capsule   atorvastatin 40 mg tablet; Commonly known as: Lipitor; TAKE 1 TABLET BY   MOUTH EVERY DAY   calcium carbonate 1,250 mg (500 mg elemental) tablet; Commonly known as:   Os-John   cholecalciferol 125 mcg (5,000 units) capsule; Commonly known as:   Vitamin D-3   estradiol 0.01 % (0.1 mg/gram) vaginal cream; Commonly known as:   Estrace; Insert 1g vaginally three times per week at bedtime.   memantine 10 mg tablet; Commonly known as: Namenda; Take 1 tablet (10   mg) by mouth 2 times a day. Do not fill before March 5, 2025.   multivitamin capsule   sertraline 100 mg tablet; Commonly known as: Zoloft; Take 1 tablet (100   mg) by mouth once daily.       Test Results Pending At Discharge  Pending Labs       No current pending labs.            Hospital Course   84 y.o. female with a past medical history of hypertension, hypercholesterolemia, anxiety, depression, TBI, cognitive communication deficit, urge incontinence follows with urology presenting as a transfer from [facility].  Patient presented to the emergency department there yesterday after 2 falls, admitted 7/12. She was noted to be orthostatic positive while in the hospital, was treated with fluid resuscitation and home amlodipine was held. She was treated with IV rocephin for UTI.    Patient improved clinically and was  deemed stable for discharge on 7/14. She was discharged with 3 more days of oral cephalexin, will be treated for her UTI for 5 total days. She is going home with home PT/OT. I am also ordering a wheeled walker for home.    DISCHARGE TIME: > 30 minutes      Pertinent Physical Exam At Time of Discharge  Physical Exam  Vitals and nursing note reviewed.   Constitutional:       General: She is not in acute distress.     Appearance: Normal appearance.   HENT:      Head: Normocephalic and atraumatic.   Eyes:      General: No scleral icterus.     Extraocular Movements: Extraocular movements intact.      Pupils: Pupils are equal, round, and reactive to light.   Cardiovascular:      Rate and Rhythm: Normal rate and regular rhythm.      Pulses: Normal pulses.      Heart sounds: Normal heart sounds.   Pulmonary:      Effort: Pulmonary effort is normal.      Breath sounds: Normal breath sounds. No wheezing, rhonchi or rales.   Abdominal:      General: Abdomen is flat. Bowel sounds are normal. There is no distension.      Tenderness: There is no abdominal tenderness. There is no guarding.   Musculoskeletal:         General: Swelling (bilateral LE nonpitting edema) present. No tenderness. Normal range of motion.   Skin:     General: Skin is warm and dry.      Findings: No erythema or rash.   Neurological:      General: No focal deficit present.      Mental Status: She is alert and oriented to person, place, and time.         Outpatient Follow-Up  Future Appointments   Date Time Provider Department Center   7/23/2025  1:00 PM Wesley Burr MD [id number] [facility]   7/25/2025  1:30 PM Bonita Quezada MD [id number] [facility]   10/1/2025  1:00 PM Felicita Neumann APRN-CNP [id number] [facility]   11/10/2025  1:00 PM Aren Bernstein MD [id number] [facility]   2/11/2026  1:00 PM Aren Bernstein MD [id number] [facility]   2/24/2026  1:00 PM Michelle Byrnes MD [id number] [facility]         Stone Araujo DO

## 2025-07-14 NOTE — PROGRESS NOTES
Physical Therapy    Physical Therapy Evaluation    Patient Name: Noy Ochoa  MRN: [id number]  Department: [place] [address]  Room: [address] [place] [address]  Today's Date: 7/14/2025   Time Calculation  Start Time: 0945  Stop Time: 1003  Time Calculation (min): 18 min    Assessment/Plan   PT Assessment  PT Assessment Results: Decreased strength, Decreased endurance, Impaired balance, Decreased mobility  Rehab Prognosis: Good  Barriers to Discharge Home: No anticipated barriers (HHAs to assist)  Evaluation/Treatment Tolerance: Patient tolerated treatment well  Medical Staff Made Aware: Yes  Strengths: Ability to acquire knowledge, Capable of completing ADLs semi/independent, Housing layout, Premorbid level of function  Barriers to Participation: Comorbidities, Insight into problems  End of Session Communication: Bedside nurse  Assessment Comment: Pt demonstrating deficits in generalized strength, endurance and balance as well as increased pain resulting in impaired functional mobility, gait and self care. Due to the impairments listed above, pt would benefit from skilled physical therapy services in acute care setting as well as additional therapy in LOW intensity setting with PRN supervision  End of Session Patient Position: Up in chair, Alarm on  IP OR SWING BED PT PLAN  Inpatient or Swing Bed: Inpatient  PT Plan  Treatment/Interventions: Bed mobility, Transfer training, Gait training, Stair training, Balance training, Neuromuscular re-education, Strengthening, Endurance training, Therapeutic exercise, Therapeutic activity, Home exercise program  PT Plan: Ongoing PT  PT Frequency: 3 times per week  PT Discharge Recommendations: Low intensity level of continued care  Equipment Recommended upon Discharge: Wheeled walker  PT Recommended Transfer Status: Assist x1  PT - OK to Discharge: Yes (PT POC Initiated this date)    Subjective     PT Visit Info:  PT Received On: 07/14/25  General Visit Information:  General  Reason for Referral: 84 y.o.  female presenting with frequent falls, generalized weakness and found to have + UTI.  Referred By: TIFFANY Artis-CNP  Past Medical History Relevant to Rehab: Medical History[1]    Family/Caregiver Present: No  Prior to Session Communication: Bedside nurse  Patient Position Received: Bed, 3 rail up, Alarm on  Preferred Learning Style: auditory, verbal, visual  General Comment: Pt agreeable and cooperative to PT evaluation. Improved cognition noted this date compared to OT evaluation  Home Living:  Home Living  Type of Home: House  Lives With: Alone  Home Adaptive Equipment: Cane  Home Layout: One level, Laundry in basement  Home Access: Stairs to enter with rails  Entrance Stairs-Rails: Both  Entrance Stairs-Number of Steps: 2 (Pt reports ~1 GEMMA with no HR from side (typical entrance used))  Bathroom Shower/Tub: Walk-in shower  Bathroom Toilet: Handicapped height  Bathroom Equipment: Grab bars in shower, Shower chair with back  Prior Level of Function:  Prior Function Per Pt/Caregiver Report  Level of [place]: Independent with ADLs and functional transfers, Needs assistance with homemaking  Receives Help From: Personal care attendant (HHA present 5 days a week for 5 hours)  ADL Assistance: Independent  Homemaking Assistance: Needs assistance (HHA completes laundry, cleaning, cooking, and grocery shopping.)  Driving/Transportation:  (Pt reports + drives)  Ambulatory Assistance: Independent (PRN cane)  Prior Function Comments: x3 falls in last 6 months  Precautions:  Precautions  Medical Precautions: Fall precautions             Objective   Pain:  Pain Assessment  Pain Assessment: 0-10  0-10 (Numeric) Pain Score: 0 - No pain  Cognition:  Cognition  Overall Cognitive Status: Within Functional Limits  Orientation Level: Oriented X4  Insight: Within function limits  Impulsive: Within functional limits    General Assessments:  Activity Tolerance  Endurance: Tolerates 10 - 20 min exercise with multiple  rests    Sensation  Light Touch: No apparent deficits    Coordination  Movements are Fluid and Coordinated: Yes  Coordination Comment: Appears intact    Static Sitting Balance  Static Sitting-Balance Support: No upper extremity supported, Feet supported  Static Sitting-Level of Assistance: Modified independent  Static Sitting-Comment/Number of Minutes: 5 min    Static Standing Balance  Static Standing-Balance Support: No upper extremity supported  Static Standing-Level of Assistance: Close supervision  Static Standing-Comment/Number of Minutes: 2 min  Functional Assessments:  Bed Mobility  Bed Mobility: Yes  Bed Mobility 1  Bed Mobility 1: Supine to sitting  Level of Assistance 1: Minimum assistance  Bed Mobility Comments 1: Assist to elevate trunk from flat bed surface    Transfers  Transfer: Yes  Transfer 1  Technique 1: Sit to stand, Stand to sit  Transfer Device 1: Gait belt  Transfer Level of Assistance 1: Contact guard  Trials/Comments 1: Pt requiring increased time to complete transition    Ambulation/Gait Training  Ambulation/Gait Training Performed: Yes  Ambulation/Gait Training 1  Surface 1: Level tile  Device 1: No device  Gait Support Devices: Gait belt  Assistance 1: Contact guard, Minimum assistance  Quality of Gait 1:  (reduced B step length and gait perico. General unsteadiness noted)  Comments/Distance (ft) 1: 5ft  Ambulation/Gait Training 2  Surface 2: Level tile  Device 2: Rolling walker  Gait Support Devices: Gait belt  Assistance 2: Close supervision  Quality of Gait 2:  (mildly reduced B step length and gait perico. Flexed trunk posture. Signficantly improved balance using FWW compared to no device)  Comments/Distance (ft) 2: 18ft  Extremity/Trunk Assessments:  RLE   RLE : Within Functional Limits  LLE   LLE : Within Functional Limits  Outcome Measures:  [facility] Basic Mobility  Turning from your back to your side while in a flat bed without using bedrails: A little  Moving from lying on your  back to sitting on the side of a flat bed without using bedrails: A little  Moving to and from bed to chair (including a wheelchair): A little  Standing up from a chair using your arms (e.g. wheelchair or bedside chair): A little  To walk in hospital room: A little  Climbing 3-5 steps with railing: A lot  Basic Mobility - Total Score: 17    Encounter Problems       Encounter Problems (Active)       Mobility       LTG - Patient will navigate 2 steps with rails/device       Start:  07/14/25    Expected End:  07/28/25       SUP using BHRs         STG - Patient will ambulate       Start:  07/14/25    Expected End:  07/28/25       Mod Ind using LRAD (FWW vs SPC - anticipate FWW)            PT Transfers       STG - Patient will perform bed mobility       Start:  07/14/25    Expected End:  07/28/25       Mod Ind         STG - Patient will transfer sit to and from stand       Start:  07/14/25    Expected End:  07/28/25       Mod Ind                Education Documentation  Precautions, taught by Alphonso Alaniz, PT at 7/14/2025 11:29 AM.  Learner: Patient  Readiness: Acceptance  Method: Explanation  Response: Verbalizes Understanding  Comment: see above    Body Mechanics, taught by Alphonso Alaniz, PT at 7/14/2025 11:29 AM.  Learner: Patient  Readiness: Acceptance  Method: Explanation  Response: Verbalizes Understanding  Comment: see above    Mobility Training, taught by Alphonso Alaniz, PT at 7/14/2025 11:29 AM.  Learner: Patient  Readiness: Acceptance  Method: Explanation  Response: Verbalizes Understanding  Comment: see above    Education Comments  No comments found.                 [1]   Past Medical History:  Diagnosis Date    Anxiety     Combined forms of age-related cataract, left eye 09/06/2022    Combined form of age-related cataract, left eye    Combined forms of age-related cataract, right eye 09/06/2022    Combined form of age-related cataract, right eye    Depression, unspecified 02/23/2022     Depression    Hyperlipidemia     Hypertension     Macular drusen, bilateral     OAB (overactive bladder)     Urinary tract infection

## 2025-07-14 NOTE — CARE PLAN
The patient's goals for the shift include      The clinical goals for the shift include maintain pt safety

## 2025-07-14 NOTE — PROGRESS NOTES
07/14/25 1248   Discharge Planning   Living Arrangements Alone   Support Systems Family members   Assistance Needed Met with patient at bedside, explained role of TCC, patient admitted for UTI and 2 falls, patient states she lives alone, Dr Garber is her pcp, uses the [facility] on [address], lives alone with her dog, does have an assistant who does her grocery shopping, patient uses a cane at baseline but would like a walker at discharge (therapy and provider aware) is agreeable to [facility] at discharge. Has transport home when ready for dc.   Type of Residence Private residence   Type of Animals or Pets dog   Home or Post Acute Services In home services   Type of Home Care Services Home OT;Home PT;Home nursing visits   Expected Discharge Disposition Home H   Does the patient need discharge transport arranged? No   Financial Resource Strain   How hard is it for you to pay for the very basics like food, housing, medical care, and heating? Not hard   Housing Stability   In the last 12 months, was there a time when you were not able to pay the mortgage or rent on time? N   In the past 12 months, how many times have you moved where you were living? 0   At any time in the past 12 months, were you homeless or living in a shelter (including now)? N   Transportation Needs   In the past 12 months, has lack of transportation kept you from medical appointments or from getting medications? no   In the past 12 months, has lack of transportation kept you from meetings, work, or from getting things needed for daily living? No   Patient Choice   Provider Choice list and CMS website (https://medicare.gov/care-compare#search) for post-acute Quality and Resource Measure Data were provided and reviewed with: Patient

## 2025-07-16 ENCOUNTER — PATIENT OUTREACH (OUTPATIENT)
Dept: CARE COORDINATION | Age: 85
End: 2025-07-16
Payer: MEDICARE

## 2025-07-16 LAB
BACTERIA BLD CULT: NORMAL
BACTERIA BLD CULT: NORMAL

## 2025-07-17 ENCOUNTER — PATIENT OUTREACH (OUTPATIENT)
Dept: CARE COORDINATION | Facility: CLINIC | Age: 85
End: 2025-07-17
Payer: MEDICARE

## 2025-07-17 NOTE — PROGRESS NOTES
Outreach call to patient to support a smooth transition of care from recent admission. Phone answered, asked to speak to patient, and then phone was disconnected. Noted patient identified for healthy at home program. Will outreach accordingly.

## 2025-07-18 ENCOUNTER — PATIENT OUTREACH (OUTPATIENT)
Dept: CARE COORDINATION | Facility: CLINIC | Age: 85
End: 2025-07-18
Payer: MEDICARE

## 2025-07-18 SDOH — ECONOMIC STABILITY: GENERAL: WOULD YOU LIKE HELP WITH ANY OF THE FOLLOWING NEEDS?: I DONT NEED HELP WITH ANY OF THESE

## 2025-07-18 NOTE — PROGRESS NOTES
Received return call from patient to support a smooth transition of care from recent admission.  Spoke with patient, reviewed discharge medications, discharge instructions, assessed social needs, and provided education on importance of follow-up appointment with provider.  Will continue to monitor through transition period.    
5

## 2025-07-22 ENCOUNTER — HOME CARE VISIT (OUTPATIENT)
Dept: HOME HEALTH SERVICES | Facility: HOME HEALTH | Age: 85
End: 2025-07-22
Payer: MEDICARE

## 2025-07-22 VITALS
HEART RATE: 85 BPM | OXYGEN SATURATION: 92 % | SYSTOLIC BLOOD PRESSURE: 130 MMHG | HEIGHT: 62 IN | TEMPERATURE: 97.5 F | WEIGHT: 166 LBS | BODY MASS INDEX: 30.55 KG/M2 | RESPIRATION RATE: 16 BRPM | DIASTOLIC BLOOD PRESSURE: 70 MMHG

## 2025-07-22 PROCEDURE — 169592 NO-PAY CLAIM PROCEDURE

## 2025-07-22 PROCEDURE — G0299 HHS/HOSPICE OF RN EA 15 MIN: HCPCS | Mod: HHH

## 2025-07-22 ASSESSMENT — ACTIVITIES OF DAILY LIVING (ADL): ENTERING_EXITING_HOME: SUPERVISION

## 2025-07-22 ASSESSMENT — ENCOUNTER SYMPTOMS: DENIES PAIN: 1

## 2025-07-23 ENCOUNTER — APPOINTMENT (OUTPATIENT)
Dept: NEUROLOGY | Facility: CLINIC | Age: 85
End: 2025-07-23
Payer: MEDICARE

## 2025-07-23 ENCOUNTER — APPOINTMENT (OUTPATIENT)
Dept: GERIATRIC MEDICINE | Facility: CLINIC | Age: 85
End: 2025-07-23
Payer: MEDICARE

## 2025-07-23 ENCOUNTER — OFFICE VISIT (OUTPATIENT)
Dept: NEUROLOGY | Facility: CLINIC | Age: 85
End: 2025-07-23
Payer: MEDICARE

## 2025-07-23 VITALS
DIASTOLIC BLOOD PRESSURE: 65 MMHG | SYSTOLIC BLOOD PRESSURE: 105 MMHG | HEART RATE: 106 BPM | WEIGHT: 170 LBS | BODY MASS INDEX: 31.09 KG/M2 | TEMPERATURE: 98.4 F | RESPIRATION RATE: 18 BRPM

## 2025-07-23 DIAGNOSIS — F01.A0 MILD VASCULAR DEMENTIA WITHOUT BEHAVIORAL DISTURBANCE, PSYCHOTIC DISTURBANCE, MOOD DISTURBANCE, OR ANXIETY: ICD-10-CM

## 2025-07-23 DIAGNOSIS — I67.9 CEREBROVASCULAR DISEASE: ICD-10-CM

## 2025-07-23 DIAGNOSIS — I10 BENIGN ESSENTIAL HYPERTENSION: ICD-10-CM

## 2025-07-23 DIAGNOSIS — G30.9 MIXED ALZHEIMER AND VASCULAR DEMENTIA: Primary | ICD-10-CM

## 2025-07-23 DIAGNOSIS — F32.89 OTHER DEPRESSION: ICD-10-CM

## 2025-07-23 DIAGNOSIS — F02.80 MIXED ALZHEIMER AND VASCULAR DEMENTIA: Primary | ICD-10-CM

## 2025-07-23 DIAGNOSIS — F01.50 MIXED ALZHEIMER AND VASCULAR DEMENTIA: Primary | ICD-10-CM

## 2025-07-23 PROCEDURE — 1159F MED LIST DOCD IN RCRD: CPT | Performed by: PSYCHIATRY & NEUROLOGY

## 2025-07-23 PROCEDURE — 1126F AMNT PAIN NOTED NONE PRSNT: CPT | Performed by: PSYCHIATRY & NEUROLOGY

## 2025-07-23 PROCEDURE — 3078F DIAST BP <80 MM HG: CPT | Performed by: PSYCHIATRY & NEUROLOGY

## 2025-07-23 PROCEDURE — 1036F TOBACCO NON-USER: CPT | Performed by: PSYCHIATRY & NEUROLOGY

## 2025-07-23 PROCEDURE — 1111F DSCHRG MED/CURRENT MED MERGE: CPT | Performed by: PSYCHIATRY & NEUROLOGY

## 2025-07-23 PROCEDURE — 99214 OFFICE O/P EST MOD 30 MIN: CPT | Performed by: PSYCHIATRY & NEUROLOGY

## 2025-07-23 PROCEDURE — 3074F SYST BP LT 130 MM HG: CPT | Performed by: PSYCHIATRY & NEUROLOGY

## 2025-07-23 PROCEDURE — 1160F RVW MEDS BY RX/DR IN RCRD: CPT | Performed by: PSYCHIATRY & NEUROLOGY

## 2025-07-23 RX ORDER — ATORVASTATIN CALCIUM 40 MG/1
40 TABLET, FILM COATED ORAL NIGHTLY
Start: 2025-07-23

## 2025-07-23 RX ORDER — MEMANTINE HYDROCHLORIDE 10 MG/1
10 TABLET ORAL 2 TIMES DAILY
Qty: 180 TABLET | Refills: 3 | Status: SHIPPED | OUTPATIENT
Start: 2025-07-23 | End: 2026-07-23

## 2025-07-23 RX ORDER — SERTRALINE HYDROCHLORIDE 100 MG/1
150 TABLET, FILM COATED ORAL DAILY
Qty: 135 TABLET | Refills: 3 | Status: SHIPPED | OUTPATIENT
Start: 2025-07-23 | End: 2026-07-23

## 2025-07-23 ASSESSMENT — MONTREAL COGNITIVE ASSESSMENT (MOCA)
VISUOSPATIAL/EXECUTIVE SUBSCORE: 2
11. FOR EACH PAIR OF WORDS, WHAT CATEGORY DO THEY BELONG TO (OUT OF 2): 2
7. [VIGILENCE] TAP WHEN HEARING DESIGNATED LETTER: 1
12. MEMORY INDEX SCORE: 1
13. ORIENTATION SUBSCORE: 6
9. REPEAT EACH SENTENCE: 2
10. [FLUENCY] NAME WORDS STARTING WITH DESIGNATED LETTER: 0
WHAT IS THE TOTAL SCORE (OUT OF 30): 19
8. SERIAL SUBTRACTION OF 7S: 1
WHAT LEVEL OF EDUCATION WAS ATTAINED: 0
4. NAME EACH OF THE THREE ANIMALS SHOWN: 3
5. MEMORY TRIALS: 0
6. READ LIST OF DIGITS [FORWARD/BACKWARD]: 1

## 2025-07-23 ASSESSMENT — PATIENT HEALTH QUESTIONNAIRE - PHQ9: 1. LITTLE INTEREST OR PLEASURE IN DOING THINGS: NOT AT ALL

## 2025-07-23 ASSESSMENT — PAIN SCALES - GENERAL: PAINLEVEL_OUTOF10: 0-NO PAIN

## 2025-07-23 ASSESSMENT — ENCOUNTER SYMPTOMS
LOSS OF SENSATION IN FEET: 0
OCCASIONAL FEELINGS OF UNSTEADINESS: 0

## 2025-07-23 NOTE — PATIENT INSTRUCTIONS
- have them send us a copy of your driving evaluation (fax, [phone])  - decrease sertraline (Zoloft) to 125mg daily for two weeks then if you are still doing well, you can lower it to 100mg daily.  - follow-up with me in about 6 months    General brain health guidelines:  - make sure your other medical conditions are well controlled (e.g., high blood pressure, high cholesterol, diabetes, sleep apnea etc)  - do not smoke or chew tobacco  - address any sensory deficits (e.g., proper glasses for poor eyesight, hearing aids for hearing loss)  - use a weekly pill box  - eat a heart healthy diet (e.g., lots of fruits and vegetables, low fat and cholesterol)  - exercise at least four days per week, 30 minutes at a time at an intensity that would make it difficult to converse with someone   - make sure you are getting at least 7 hours of quality sleep per night  - keep yourself mentally active daily by reading, playing cards, doing word searches, puzzles, etc.  - stay socially active by being part of a group or organization    Please note that the above may not be an entirely accurate or complete list of your medications, allergies, past medical history, past surgical history, or active problems as the document is completed following your visit.

## 2025-07-23 NOTE — PROGRESS NOTES
Start Time: 1:05pm  Stop Time: 1:35pm    Chart reviewed, including physician notes and diagnostic studies, for 5 minutes prior to this appointment.     Accompanied by: son    Formulation: Ms. Ochoa is a very-pleasant 84 y.o. year old,  female with a past personal history of multifactorial dementia (vascular and Alzheimer's disease) who is presenting today for a follow-up visit.  Her cognition and level of functioning are relatively stable.      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), mild severity  cerebrovascular disease   history of traumatic brain injury   Depression, Not otherwise specified, in remission    Plan:  - have them send us a copy of your driving evaluation (fax, [phone])  - decrease sertraline (Zoloft) to 125mg daily for two weeks then if you are still doing well, you can lower it to 100mg daily.  - follow-up with me in about 6 months  -----------------------------------------------------------------------------------    History of Present Illness:  I last saw the patient in February 2025, at which time we started memantine and recommended repeat driving evaluation.  Mood is good.  Sleeping well.  Appetite is good.  No passive death wish or suicidal ideation.  She had a UTI over the weekend that was recognized because she had some falls.  She takes pills from the pill box that her son fills up weekly.  He states that she is accurate about 90% of the time.  She mainly does precooked meals.  Still driving without incident.  She got a driving evaluation in May/June.  A caregiver checks in on her three days per week.  She is getting PT/OT.  Cognition is a little bit worse but fairly stable.      Allergies[1]    Current Medications[2]    Review of Systems  As per HPI, otherwise all other systems have been reviewed are negative for complaint.      Objective   /65   Pulse 106   Temp 36.9 °C (98.4 °F) (Tympanic)   Resp 18   Wt 77.1  "kg (170 lb)   BMI 31.09 kg/m²     EXAMINATION  Mental Status Examination  General appearance: well kept, good eye contact, cooperative  Orientation: alert and oriented to time, place, and person  Motor: within normal limits, gait is stable  Speech: normal rate, tone and rhythm  Mood: euthymic  Affect: Euthymic, full-range  Passive death wish: No  Suicidal ideation: No  Thought process: logical, linear, and goal-directed  Thought content: Hallucinations:no, Delusions: none, denied; and not responding to internal stimuli  Insight/Judgment: Good/Good  Fund of knowledge: Good  Recent and remote memory: Poor/Good  Attention span and concentration: Fair  Language: some word finding difficulties without paraphrasic errors    MoCA (7/2025): 18/30 (-3 visuospatial/executive, -3 attention, -1 language, -4 delayed recall)  \"F\"=9 words  MIS: 8/15     MOCA (2/2025): 19/30     MoCA (06/19/2024): 24/30 (-1 visuospatial/executive, -2 attention, -1 language, -1 delayed recall, -1 orientation)   \"F\"= 10 words  MIS: 13/15     MoCA ( 08/16/2023): 22/30 (-1 visuospatial/executive, -1 attention, -1 language, -3 delayed recall, -1 orientation)  \"f\"=8 words  MIS: 10/15     MoCA (8/25/2021): 26/30 (-1 visuospatial/executive, -2 attention, -1 orientation)     MoCA ( 07/08/2020): 27/30 (-2 visuospatial/executive, -1 attention)  \"f\"=11 words  MIS: 15/5     MoCA (11/12/18): 23/30     MoCA (11/2017): 23/30     MoCA (10/16) 27/30        [1]   Allergies  Allergen Reactions    Aspirin Swelling    Food Extracts Swelling     Cod fish, violet, onion and hopps    Violet Unknown    Latex Unknown    Lidocaine Swelling   [2]   Current Outpatient Medications:     alendronate (Fosamax) 70 mg tablet, TAKE 1 TABLET BY MOUTH EVERY 7 DAYS, Disp: 12 tablet, Rfl: 0    amLODIPine (Norvasc) 2.5 mg tablet, TAKE 1 TABLET BY MOUTH ONCE DAILY., Disp: 90 tablet, Rfl: 0    ascorbic acid, vitamin C, 500 mg capsule, Take 500 mg by mouth., Disp: , Rfl:     atorvastatin " (Lipitor) 40 mg tablet, TAKE 1 TABLET BY MOUTH EVERY DAY (Patient taking differently: Take 1 tablet (40 mg) by mouth once daily at bedtime.), Disp: 90 tablet, Rfl: 0    calcium carbonate (Oscal) 500 mg calcium (1,250 mg) tablet, Take 1 tablet by mouth once daily., Disp: , Rfl:     cholecalciferol (Vitamin D-3) 125 MCG (5000 UT) capsule, Take 1 capsule (125 mcg) by mouth once daily., Disp: , Rfl:     estradiol (Estrace) 0.01 % (0.1 mg/gram) vaginal cream, Insert 1g vaginally three times per week at bedtime., Disp: 42.5 g, Rfl: 2    memantine (Namenda) 10 mg tablet, Take 1 tablet (10 mg) by mouth 2 times a day. Do not fill before March 5, 2025., Disp: 60 tablet, Rfl: 2    multivitamin capsule, Take 1 capsule by mouth once daily., Disp: , Rfl:     sertraline (Zoloft) 100 mg tablet, Take 1 tablet (100 mg) by mouth once daily., Disp: 90 tablet, Rfl: 3

## 2025-07-24 ENCOUNTER — HOME CARE VISIT (OUTPATIENT)
Dept: HOME HEALTH SERVICES | Facility: HOME HEALTH | Age: 85
End: 2025-07-24
Payer: MEDICARE

## 2025-07-24 VITALS — RESPIRATION RATE: 16 BRPM

## 2025-07-24 PROCEDURE — G0152 HHCP-SERV OF OT,EA 15 MIN: HCPCS | Mod: HHH

## 2025-07-24 PROCEDURE — G0151 HHCP-SERV OF PT,EA 15 MIN: HCPCS | Mod: HHH

## 2025-07-24 SDOH — HEALTH STABILITY: PHYSICAL HEALTH: EXERCISE TYPE: B LE

## 2025-07-24 ASSESSMENT — ACTIVITIES OF DAILY LIVING (ADL)
DRESSING_UB_CURRENT_FUNCTION: INDEPENDENT
TOILETING: 1
CURRENT_FUNCTION: SUPERVISION
AMBULATION ASSISTANCE ON FLAT SURFACES: 1
DRESSING_LB_CURRENT_FUNCTION: INDEPENDENT
PREPARING MEALS: INDEPENDENT
BATHING_CURRENT_FUNCTION: INDEPENDENT
AMBULATION_DISTANCE/DURATION_TOLERATED: 100 FT
TOILETING: INDEPENDENT
BATHING ASSESSED: 1
PHYSICAL TRANSFERS ASSESSED: 1

## 2025-07-24 ASSESSMENT — ENCOUNTER SYMPTOMS
PERSON REPORTING PAIN: PATIENT
DENIES PAIN: 1

## 2025-07-25 ENCOUNTER — APPOINTMENT (OUTPATIENT)
Dept: UROLOGY | Facility: CLINIC | Age: 85
End: 2025-07-25
Payer: MEDICARE

## 2025-07-26 ASSESSMENT — ENCOUNTER SYMPTOMS
ANGER WITHIN DEFINED LIMITS: 1
APPETITE LEVEL: GOOD
SLEEP QUALITY: ADEQUATE
AGGRESSION WITHIN DEFINED LIMITS: 1
LAST BOWEL MOVEMENT: 67408

## 2025-07-26 ASSESSMENT — ACTIVITIES OF DAILY LIVING (ADL): OASIS_M1830: 02

## 2025-07-30 ENCOUNTER — HOME CARE VISIT (OUTPATIENT)
Dept: HOME HEALTH SERVICES | Facility: HOME HEALTH | Age: 85
End: 2025-07-30
Payer: MEDICARE

## 2025-07-30 VITALS
HEART RATE: 99 BPM | OXYGEN SATURATION: 94 % | TEMPERATURE: 97.9 F | DIASTOLIC BLOOD PRESSURE: 66 MMHG | SYSTOLIC BLOOD PRESSURE: 108 MMHG | RESPIRATION RATE: 18 BRPM

## 2025-07-30 PROCEDURE — G0300 HHS/HOSPICE OF LPN EA 15 MIN: HCPCS | Mod: HHH

## 2025-07-30 ASSESSMENT — ENCOUNTER SYMPTOMS
APPETITE LEVEL: GOOD
DENIES PAIN: 1
MUSCLE WEAKNESS: 1

## 2025-07-30 ASSESSMENT — PAIN SCALES - PAIN ASSESSMENT IN ADVANCED DEMENTIA (PAINAD)
NEGVOCALIZATION: 0
CONSOLABILITY: 0
FACIALEXPRESSION: 0 - SMILING OR INEXPRESSIVE.
TOTALSCORE: 0
FACIALEXPRESSION: 0
CONSOLABILITY: 0 - NO NEED TO CONSOLE.
BODYLANGUAGE: 0
BREATHING: 0
BODYLANGUAGE: 0 - RELAXED.
NEGVOCALIZATION: 0 - NONE.

## 2025-07-31 ENCOUNTER — PATIENT OUTREACH (OUTPATIENT)
Dept: CARE COORDINATION | Facility: CLINIC | Age: 85
End: 2025-07-31
Payer: MEDICARE

## 2025-07-31 NOTE — PROGRESS NOTES
Outreach call by CM for follow up. Spoke to patient, she states she is doing well, no needs identified. Patient is active with [facility]. Will continue to follow through transition period.

## 2025-08-06 ENCOUNTER — HOME CARE VISIT (OUTPATIENT)
Dept: HOME HEALTH SERVICES | Facility: HOME HEALTH | Age: 85
End: 2025-08-06
Payer: MEDICARE

## 2025-08-06 VITALS
TEMPERATURE: 97.2 F | DIASTOLIC BLOOD PRESSURE: 68 MMHG | SYSTOLIC BLOOD PRESSURE: 118 MMHG | HEART RATE: 91 BPM | OXYGEN SATURATION: 92 % | RESPIRATION RATE: 18 BRPM

## 2025-08-06 PROCEDURE — G0300 HHS/HOSPICE OF LPN EA 15 MIN: HCPCS | Mod: HHH

## 2025-08-06 ASSESSMENT — ENCOUNTER SYMPTOMS
PERSON REPORTING PAIN: PATIENT
LAST BOWEL MOVEMENT: 67422
MUSCLE WEAKNESS: 1
APPETITE LEVEL: GOOD
DENIES PAIN: 1

## 2025-08-06 ASSESSMENT — PAIN SCALES - PAIN ASSESSMENT IN ADVANCED DEMENTIA (PAINAD)
TOTALSCORE: 0
BODYLANGUAGE: 0 - RELAXED.
BREATHING: 0
CONSOLABILITY: 0 - NO NEED TO CONSOLE.
FACIALEXPRESSION: 0 - SMILING OR INEXPRESSIVE.
BODYLANGUAGE: 0
FACIALEXPRESSION: 0
NEGVOCALIZATION: 0 - NONE.
CONSOLABILITY: 0
NEGVOCALIZATION: 0

## 2025-08-07 DIAGNOSIS — F01.A0 MILD VASCULAR DEMENTIA WITHOUT BEHAVIORAL DISTURBANCE, PSYCHOTIC DISTURBANCE, MOOD DISTURBANCE, OR ANXIETY: ICD-10-CM

## 2025-08-07 RX ORDER — SERTRALINE HYDROCHLORIDE 100 MG/1
100 TABLET, FILM COATED ORAL DAILY
Qty: 90 TABLET | Refills: 3 | OUTPATIENT
Start: 2025-08-07

## 2025-08-07 RX ORDER — SERTRALINE HYDROCHLORIDE 100 MG/1
150 TABLET, FILM COATED ORAL DAILY
Qty: 135 TABLET | Refills: 3 | Status: SHIPPED | OUTPATIENT
Start: 2025-08-07 | End: 2026-08-07

## 2025-08-12 ENCOUNTER — HOME CARE VISIT (OUTPATIENT)
Dept: HOME HEALTH SERVICES | Facility: HOME HEALTH | Age: 85
End: 2025-08-12
Payer: MEDICARE

## 2025-08-12 VITALS
TEMPERATURE: 98.7 F | OXYGEN SATURATION: 91 % | SYSTOLIC BLOOD PRESSURE: 108 MMHG | HEART RATE: 94 BPM | RESPIRATION RATE: 18 BRPM | DIASTOLIC BLOOD PRESSURE: 62 MMHG

## 2025-08-12 PROCEDURE — G0300 HHS/HOSPICE OF LPN EA 15 MIN: HCPCS | Mod: HHH

## 2025-08-12 ASSESSMENT — PAIN SCALES - PAIN ASSESSMENT IN ADVANCED DEMENTIA (PAINAD)
FACIALEXPRESSION: 0
FACIALEXPRESSION: 0 - SMILING OR INEXPRESSIVE.
CONSOLABILITY: 0 - NO NEED TO CONSOLE.
BODYLANGUAGE: 0
CONSOLABILITY: 0
BREATHING: 0
NEGVOCALIZATION: 0 - NONE.
BODYLANGUAGE: 0 - RELAXED.
TOTALSCORE: 0
NEGVOCALIZATION: 0

## 2025-08-12 ASSESSMENT — ENCOUNTER SYMPTOMS
DENIES PAIN: 1
LAST BOWEL MOVEMENT: 67428
APPETITE LEVEL: GOOD
MUSCLE WEAKNESS: 1

## 2025-08-15 DIAGNOSIS — N39.0 RECURRENT UTI: ICD-10-CM

## 2025-08-19 ENCOUNTER — LAB REQUISITION (OUTPATIENT)
Dept: LAB | Facility: HOSPITAL | Age: 85
End: 2025-08-19
Payer: MEDICARE

## 2025-08-19 ENCOUNTER — HOME CARE VISIT (OUTPATIENT)
Dept: HOME HEALTH SERVICES | Facility: HOME HEALTH | Age: 85
End: 2025-08-19
Payer: MEDICARE

## 2025-08-19 VITALS
SYSTOLIC BLOOD PRESSURE: 135 MMHG | HEART RATE: 88 BPM | OXYGEN SATURATION: 98 % | DIASTOLIC BLOOD PRESSURE: 70 MMHG | TEMPERATURE: 97.5 F | RESPIRATION RATE: 16 BRPM

## 2025-08-19 DIAGNOSIS — N39.0 URINARY TRACT INFECTION, SITE NOT SPECIFIED: ICD-10-CM

## 2025-08-19 LAB
APPEARANCE UR: ABNORMAL
BILIRUB UR STRIP.AUTO-MCNC: NEGATIVE MG/DL
COLOR UR: ABNORMAL
GLUCOSE UR STRIP.AUTO-MCNC: NORMAL MG/DL
KETONES UR STRIP.AUTO-MCNC: NEGATIVE MG/DL
LEUKOCYTE ESTERASE UR QL STRIP.AUTO: ABNORMAL
MUCOUS THREADS #/AREA URNS AUTO: ABNORMAL /LPF
NITRITE UR QL STRIP.AUTO: ABNORMAL
PH UR STRIP.AUTO: 6 [PH]
PROT UR STRIP.AUTO-MCNC: ABNORMAL MG/DL
RBC # UR STRIP.AUTO: ABNORMAL MG/DL
RBC #/AREA URNS AUTO: >20 /HPF
SP GR UR STRIP.AUTO: 1.02
SQUAMOUS #/AREA URNS AUTO: ABNORMAL /HPF
UROBILINOGEN UR STRIP.AUTO-MCNC: NORMAL MG/DL
WBC #/AREA URNS AUTO: >50 /HPF
WBC CLUMPS #/AREA URNS AUTO: ABNORMAL /HPF

## 2025-08-19 PROCEDURE — 87077 CULTURE AEROBIC IDENTIFY: CPT

## 2025-08-19 PROCEDURE — 87086 URINE CULTURE/COLONY COUNT: CPT

## 2025-08-19 PROCEDURE — 87186 SC STD MICRODIL/AGAR DIL: CPT

## 2025-08-19 PROCEDURE — G0299 HHS/HOSPICE OF RN EA 15 MIN: HCPCS | Mod: HHH

## 2025-08-19 PROCEDURE — 81001 URINALYSIS AUTO W/SCOPE: CPT

## 2025-08-19 ASSESSMENT — ENCOUNTER SYMPTOMS
LAST BOWEL MOVEMENT: 67435
APPETITE LEVEL: GOOD
DENIES PAIN: 1

## 2025-08-20 DIAGNOSIS — F02.80 MIXED ALZHEIMER AND VASCULAR DEMENTIA: Primary | ICD-10-CM

## 2025-08-20 DIAGNOSIS — F01.50 MIXED ALZHEIMER AND VASCULAR DEMENTIA: Primary | ICD-10-CM

## 2025-08-20 DIAGNOSIS — G30.9 MIXED ALZHEIMER AND VASCULAR DEMENTIA: Primary | ICD-10-CM

## 2025-08-20 LAB — HOLD SPECIMEN: NORMAL

## 2025-08-21 LAB — BACTERIA UR CULT: ABNORMAL

## 2025-08-21 ASSESSMENT — ACTIVITIES OF DAILY LIVING (ADL)
OASIS_M1830: 02
HOME_HEALTH_OASIS: 01

## 2025-08-22 ENCOUNTER — PATIENT OUTREACH (OUTPATIENT)
Dept: CARE COORDINATION | Facility: CLINIC | Age: 85
End: 2025-08-22
Payer: MEDICARE

## 2025-11-10 ENCOUNTER — APPOINTMENT (OUTPATIENT)
Dept: PRIMARY CARE | Facility: CLINIC | Age: 85
End: 2025-11-10
Payer: MEDICARE

## 2026-02-11 ENCOUNTER — APPOINTMENT (OUTPATIENT)
Dept: PRIMARY CARE | Facility: CLINIC | Age: 86
End: 2026-02-11
Payer: MEDICARE

## 2026-02-24 ENCOUNTER — APPOINTMENT (OUTPATIENT)
Dept: OPHTHALMOLOGY | Facility: CLINIC | Age: 86
End: 2026-02-24
Payer: MEDICARE

## (undated) DEVICE — APPLICATOR, COTTON TIP, 6 IN, 2PK, STERILE

## (undated) DEVICE — SOLUTION, OPHTHALMIC, TETRACAINE HCL 0.5%, 2 ML, VIAL